# Patient Record
Sex: FEMALE | Race: BLACK OR AFRICAN AMERICAN | NOT HISPANIC OR LATINO | Employment: OTHER | URBAN - METROPOLITAN AREA
[De-identification: names, ages, dates, MRNs, and addresses within clinical notes are randomized per-mention and may not be internally consistent; named-entity substitution may affect disease eponyms.]

---

## 2017-12-11 ENCOUNTER — HISTORICAL (OUTPATIENT)
Dept: RADIOLOGY | Facility: HOSPITAL | Age: 62
End: 2017-12-11

## 2018-01-22 ENCOUNTER — HISTORICAL (OUTPATIENT)
Dept: INTERNAL MEDICINE | Facility: CLINIC | Age: 63
End: 2018-01-22

## 2018-09-06 ENCOUNTER — HISTORICAL (OUTPATIENT)
Dept: INTERNAL MEDICINE | Facility: CLINIC | Age: 63
End: 2018-09-06

## 2019-08-13 ENCOUNTER — HISTORICAL (OUTPATIENT)
Dept: ADMINISTRATIVE | Facility: HOSPITAL | Age: 64
End: 2019-08-13

## 2020-02-07 ENCOUNTER — HISTORICAL (OUTPATIENT)
Dept: ADMINISTRATIVE | Facility: HOSPITAL | Age: 65
End: 2020-02-07

## 2020-08-05 ENCOUNTER — HISTORICAL (OUTPATIENT)
Dept: INTERNAL MEDICINE | Facility: CLINIC | Age: 65
End: 2020-08-05

## 2020-08-05 LAB
ABS NEUT (OLG): 3.78 X10(3)/MCL (ref 2.1–9.2)
ALBUMIN SERPL-MCNC: 3.1 GM/DL (ref 3.4–5)
ALBUMIN/GLOB SERPL: 0.6 RATIO (ref 1.1–2)
ALP SERPL-CCNC: 129 UNIT/L (ref 45–117)
ALT SERPL-CCNC: 28 UNIT/L (ref 12–78)
AST SERPL-CCNC: 27 UNIT/L (ref 15–37)
BASOPHILS # BLD AUTO: 0 X10(3)/MCL (ref 0–0.2)
BASOPHILS NFR BLD AUTO: 0 %
BILIRUB SERPL-MCNC: 0.2 MG/DL (ref 0.2–1)
BILIRUBIN DIRECT+TOT PNL SERPL-MCNC: <0.1 MG/DL (ref 0–0.2)
BILIRUBIN DIRECT+TOT PNL SERPL-MCNC: ABNORMAL MG/DL
BUN SERPL-MCNC: 10 MG/DL (ref 7–18)
CALCIUM SERPL-MCNC: 8.2 MG/DL (ref 8.5–10.1)
CHLORIDE SERPL-SCNC: 109 MMOL/L (ref 98–107)
CHOLEST SERPL-MCNC: 157 MG/DL
CHOLEST/HDLC SERPL: 3.7 {RATIO} (ref 0–4.4)
CO2 SERPL-SCNC: 28 MMOL/L (ref 21–32)
CREAT SERPL-MCNC: 0.8 MG/DL (ref 0.6–1.3)
EOSINOPHIL # BLD AUTO: 0.2 X10(3)/MCL (ref 0–0.9)
EOSINOPHIL NFR BLD AUTO: 3 %
ERYTHROCYTE [DISTWIDTH] IN BLOOD BY AUTOMATED COUNT: 15.3 % (ref 11.5–14.5)
EST. AVERAGE GLUCOSE BLD GHB EST-MCNC: 137 MG/DL
GLOBULIN SER-MCNC: 4.9 GM/ML (ref 2.3–3.5)
GLUCOSE SERPL-MCNC: 79 MG/DL (ref 74–106)
HBA1C MFR BLD: 6.4 % (ref 4.2–6.3)
HCT VFR BLD AUTO: 35.1 % (ref 35–46)
HDLC SERPL-MCNC: 42 MG/DL (ref 40–59)
HGB BLD-MCNC: 10.6 GM/DL (ref 12–16)
IMM GRANULOCYTES # BLD AUTO: 0.02 10*3/UL
IMM GRANULOCYTES NFR BLD AUTO: 0 %
LDLC SERPL CALC-MCNC: 97 MG/DL
LYMPHOCYTES # BLD AUTO: 2.3 X10(3)/MCL (ref 0.6–4.6)
LYMPHOCYTES NFR BLD AUTO: 33 %
MCH RBC QN AUTO: 23.1 PG (ref 26–34)
MCHC RBC AUTO-ENTMCNC: 30.2 GM/DL (ref 31–37)
MCV RBC AUTO: 76.5 FL (ref 80–100)
MONOCYTES # BLD AUTO: 0.6 X10(3)/MCL (ref 0.1–1.3)
MONOCYTES NFR BLD AUTO: 9 %
NEUTROPHILS # BLD AUTO: 3.78 X10(3)/MCL (ref 2.1–9.2)
NEUTROPHILS NFR BLD AUTO: 55 %
PLATELET # BLD AUTO: 246 X10(3)/MCL (ref 130–400)
PMV BLD AUTO: 10.2 FL (ref 7.4–10.4)
POTASSIUM SERPL-SCNC: 4.1 MMOL/L (ref 3.5–5.1)
PROT SERPL-MCNC: 8 GM/DL (ref 6.4–8.2)
RBC # BLD AUTO: 4.59 X10(6)/MCL (ref 4–5.2)
SODIUM SERPL-SCNC: 142 MMOL/L (ref 136–145)
TRIGL SERPL-MCNC: 90 MG/DL
TSH SERPL-ACNC: 0.48 MIU/L (ref 0.36–3.74)
VLDLC SERPL CALC-MCNC: 18 MG/DL
WBC # SPEC AUTO: 6.9 X10(3)/MCL (ref 4.5–11)

## 2020-08-06 ENCOUNTER — HISTORICAL (OUTPATIENT)
Dept: ADMINISTRATIVE | Facility: HOSPITAL | Age: 65
End: 2020-08-06

## 2020-08-06 LAB
APPEARANCE, UA: ABNORMAL
BACTERIA #/AREA URNS AUTO: ABNORMAL /HPF
BILIRUB UR QL STRIP: NEGATIVE
COLOR UR: ABNORMAL
GLUCOSE (UA): NEGATIVE
HGB UR QL STRIP: NEGATIVE
HYALINE CASTS #/AREA URNS LPF: ABNORMAL /LPF
KETONES UR QL STRIP: NEGATIVE
LEUKOCYTE ESTERASE UR QL STRIP: 250 LEU/UL
NITRITE UR QL STRIP: ABNORMAL
PH UR STRIP: 6 [PH] (ref 4.5–8)
PROT UR QL STRIP: 30 MG/DL
RBC #/AREA URNS AUTO: ABNORMAL /HPF
SP GR UR STRIP: 1.01 (ref 1–1.03)
SQUAMOUS #/AREA URNS LPF: >100 /LPF
UROBILINOGEN UR STRIP-ACNC: NORMAL
WBC #/AREA URNS AUTO: ABNORMAL /HPF

## 2020-08-14 ENCOUNTER — HISTORICAL (OUTPATIENT)
Dept: RADIOLOGY | Facility: HOSPITAL | Age: 65
End: 2020-08-14

## 2021-02-09 ENCOUNTER — HISTORICAL (OUTPATIENT)
Dept: ADMINISTRATIVE | Facility: HOSPITAL | Age: 66
End: 2021-02-09

## 2021-08-02 ENCOUNTER — HISTORICAL (OUTPATIENT)
Dept: ADMINISTRATIVE | Facility: HOSPITAL | Age: 66
End: 2021-08-02

## 2021-08-02 LAB — SARS-COV-2 RNA RESP QL NAA+PROBE: POSITIVE

## 2021-08-20 ENCOUNTER — HOSPITAL ENCOUNTER (OUTPATIENT)
Dept: ONCOLOGY | Facility: HOSPITAL | Age: 66
End: 2021-08-27
Attending: INTERNAL MEDICINE | Admitting: INTERNAL MEDICINE

## 2021-08-20 LAB
ABS NEUT (OLG): 11.82 X10(3)/MCL (ref 2.1–9.2)
ALBUMIN SERPL-MCNC: 3.5 GM/DL (ref 3.4–4.8)
ALBUMIN/GLOB SERPL: 1 RATIO (ref 1.1–2)
ALP SERPL-CCNC: 75 UNIT/L (ref 40–150)
ALT SERPL-CCNC: 95 UNIT/L (ref 0–55)
AMMONIA PLAS-MSCNC: 33 UMOL/L (ref 18–72)
AMPHET UR QL SCN: NEGATIVE
APPEARANCE, UA: CLEAR
APTT PPP: 28.7 SECOND(S) (ref 23.2–33.7)
AST SERPL-CCNC: 55 UNIT/L (ref 5–34)
BACTERIA SPEC CULT: ABNORMAL /HPF
BARBITURATE SCN PRESENT UR: NEGATIVE
BASOPHILS # BLD AUTO: 0 X10(3)/MCL (ref 0–0.2)
BASOPHILS NFR BLD AUTO: 0 %
BENZODIAZ UR QL SCN: NEGATIVE
BILIRUB SERPL-MCNC: 0.7 MG/DL
BILIRUB UR QL STRIP: NEGATIVE
BILIRUBIN DIRECT+TOT PNL SERPL-MCNC: 0.3 MG/DL (ref 0–0.5)
BILIRUBIN DIRECT+TOT PNL SERPL-MCNC: 0.4 MG/DL (ref 0–0.8)
BUN SERPL-MCNC: 45.6 MG/DL (ref 9.8–20.1)
CALCIUM SERPL-MCNC: 8.7 MG/DL (ref 8.4–10.2)
CANNABINOIDS UR QL SCN: NEGATIVE
CHLORIDE SERPL-SCNC: 112 MMOL/L (ref 98–107)
CO2 SERPL-SCNC: 21 MMOL/L (ref 23–31)
COCAINE UR QL SCN: NEGATIVE
COLOR UR: YELLOW
CREAT SERPL-MCNC: 1.28 MG/DL (ref 0.55–1.02)
ERYTHROCYTE [DISTWIDTH] IN BLOOD BY AUTOMATED COUNT: 16.2 % (ref 11.5–17)
ETHANOL SERPL-MCNC: <10 MG/DL
GLOBULIN SER-MCNC: 3.6 GM/DL (ref 2.4–3.5)
GLUCOSE (UA): NEGATIVE
GLUCOSE SERPL-MCNC: 169 MG/DL (ref 82–115)
HCO3 UR-SCNC: 25.3 MMOL/L (ref 22–26)
HCT VFR BLD AUTO: 45.3 % (ref 37–47)
HGB BLD-MCNC: 13.8 GM/DL (ref 12–16)
HGB UR QL STRIP: NEGATIVE
INR PPP: 1.6 (ref 0–1.3)
KETONES UR QL STRIP: NEGATIVE
LACTATE SERPL-SCNC: 2 MMOL/L (ref 0.5–2.2)
LEUKOCYTE ESTERASE UR QL STRIP: ABNORMAL
LYMPHOCYTES # BLD AUTO: 0.8 X10(3)/MCL (ref 0.6–4.6)
LYMPHOCYTES NFR BLD AUTO: 6 %
MCH RBC QN AUTO: 24.5 PG (ref 27–31)
MCHC RBC AUTO-ENTMCNC: 30.5 GM/DL (ref 33–36)
MCV RBC AUTO: 80.3 FL (ref 80–94)
MONOCYTES # BLD AUTO: 0.7 X10(3)/MCL (ref 0.1–1.3)
MONOCYTES NFR BLD AUTO: 5 %
MRSA SCREEN BY PCR: NEGATIVE
NEUTROPHILS # BLD AUTO: 11.82 X10(3)/MCL (ref 2.1–9.2)
NEUTROPHILS NFR BLD AUTO: 88 %
NITRITE UR QL STRIP: NEGATIVE
O2 HGB ARTERIAL: 91.9 % (ref 94–97)
OPIATES UR QL SCN: NEGATIVE
PCO2 BLDA: 39 MMHG (ref 35–45)
PCP UR QL: NEGATIVE
PH SMN: 7.42 [PH] (ref 7.35–7.45)
PH UR STRIP.AUTO: 5 [PH] (ref 5–7.5)
PH UR STRIP: 5 [PH] (ref 5–9)
PLATELET # BLD AUTO: 403 X10(3)/MCL (ref 130–400)
PMV BLD AUTO: 12.1 FL (ref 9.4–12.4)
PO2 BLDA: 66 MMHG (ref 80–100)
POC ALLENS TEST: POSITIVE
POC BE: 0.9 (ref -2–3)
POC CAO2: 18.6 ML/DL (ref 15.7–21.6)
POC CO HGB: 2.5 %
POC CO2: 26.5 MMOL/L (ref 22–27)
POC IONIZED CALCIUM: 1.13 MMOL/L (ref 1.12–1.23)
POC MET HGB: 1.2 % (ref 0.4–1.5)
POC SAMPLESOURCE: ABNORMAL
POC SATURATED O2: 93.1 % (ref 96–97)
POC SITE: ABNORMAL
POC THB: 14.4 GM/DL (ref 12–16)
POC TREATMENT: ABNORMAL
POTASSIUM BLD-SCNC: 4.3 MMOL/L (ref 3.6–5)
POTASSIUM SERPL-SCNC: 4.4 MMOL/L (ref 3.5–5.1)
PROT SERPL-MCNC: 7.1 GM/DL (ref 5.8–7.6)
PROT UR QL STRIP: NEGATIVE
PROTHROMBIN TIME: 18.6 SECOND(S) (ref 12.5–14.5)
RBC # BLD AUTO: 5.64 X10(6)/MCL (ref 4.2–5.4)
RBC #/AREA URNS HPF: ABNORMAL /[HPF]
RESTICK: NORMAL
SARS-COV-2 AG RESP QL IA.RAPID: NEGATIVE
SODIUM BLD-SCNC: 144 MMOL/L (ref 137–145)
SODIUM SERPL-SCNC: 149 MMOL/L (ref 136–145)
SP GR FLD REFRACTOMETRY: 1.01 (ref 1–1.03)
SP GR UR STRIP: 1.01 (ref 1–1.03)
SQUAMOUS EPITHELIAL, UA: ABNORMAL /HPF (ref 0–4)
TROPONIN I SERPL-MCNC: 0.02 NG/ML (ref 0–0.04)
UROBILINOGEN UR STRIP-ACNC: 0.2
WBC # SPEC AUTO: 13.4 X10(3)/MCL (ref 4.5–11.5)
WBC #/AREA URNS HPF: ABNORMAL /[HPF]

## 2021-08-21 LAB
ABS NEUT (OLG): 10.85 X10(3)/MCL (ref 2.1–9.2)
ALBUMIN SERPL-MCNC: 3.3 GM/DL (ref 3.4–4.8)
ALBUMIN/GLOB SERPL: 1 RATIO (ref 1.1–2)
ALP SERPL-CCNC: 69 UNIT/L (ref 40–150)
ALT SERPL-CCNC: 94 UNIT/L (ref 0–55)
AST SERPL-CCNC: 48 UNIT/L (ref 5–34)
BASOPHILS # BLD AUTO: 0 X10(3)/MCL (ref 0–0.2)
BASOPHILS NFR BLD AUTO: 0 %
BILIRUB SERPL-MCNC: 0.5 MG/DL
BILIRUBIN DIRECT+TOT PNL SERPL-MCNC: 0.2 MG/DL (ref 0–0.8)
BILIRUBIN DIRECT+TOT PNL SERPL-MCNC: 0.3 MG/DL (ref 0–0.5)
BUN SERPL-MCNC: 33.7 MG/DL (ref 9.8–20.1)
CALCIUM SERPL-MCNC: 8.5 MG/DL (ref 8.4–10.2)
CHLORIDE SERPL-SCNC: 117 MMOL/L (ref 98–107)
CO2 SERPL-SCNC: 22 MMOL/L (ref 23–31)
CREAT SERPL-MCNC: 0.94 MG/DL (ref 0.55–1.02)
CRP SERPL HS-MCNC: <0.03 MG/DL
D DIMER PPP IA.FEU-MCNC: 0.27 MCG/ML FEU (ref 0–0.5)
EOSINOPHIL # BLD AUTO: 0 X10(3)/MCL (ref 0–0.9)
EOSINOPHIL NFR BLD AUTO: 0 %
ERYTHROCYTE [DISTWIDTH] IN BLOOD BY AUTOMATED COUNT: 16.5 % (ref 11.5–17)
GLOBULIN SER-MCNC: 3.2 GM/DL (ref 2.4–3.5)
GLUCOSE SERPL-MCNC: 162 MG/DL (ref 82–115)
HCT VFR BLD AUTO: 45.9 % (ref 37–47)
HGB BLD-MCNC: 14.2 GM/DL (ref 12–16)
IMM GRANULOCYTES # BLD AUTO: 0.06 10*3/UL
IMM GRANULOCYTES NFR BLD AUTO: 0 %
LYMPHOCYTES # BLD AUTO: 1 X10(3)/MCL (ref 0.6–4.6)
LYMPHOCYTES NFR BLD AUTO: 8 %
MCH RBC QN AUTO: 24.9 PG (ref 27–31)
MCHC RBC AUTO-ENTMCNC: 30.9 GM/DL (ref 33–36)
MCV RBC AUTO: 80.4 FL (ref 80–94)
MONOCYTES # BLD AUTO: 1 X10(3)/MCL (ref 0.1–1.3)
MONOCYTES NFR BLD AUTO: 8 %
NEUTROPHILS # BLD AUTO: 10.69 X10(3)/MCL (ref 2.1–9.2)
NEUTROPHILS NFR BLD AUTO: 83 %
PLATELET # BLD AUTO: 183 X10(3)/MCL (ref 130–400)
PMV BLD AUTO: 11.6 FL (ref 7.4–10.4)
POTASSIUM SERPL-SCNC: 3.9 MMOL/L (ref 3.5–5.1)
PROT SERPL-MCNC: 6.5 GM/DL (ref 5.8–7.6)
RBC # BLD AUTO: 5.71 X10(6)/MCL (ref 4.2–5.4)
RESTICK: NORMAL
SODIUM SERPL-SCNC: 151 MMOL/L (ref 136–145)
WBC # SPEC AUTO: 13 X10(3)/MCL (ref 4.5–11.5)

## 2021-08-22 LAB
ABS NEUT (OLG): 5.65 X10(3)/MCL (ref 2.1–9.2)
ALBUMIN SERPL-MCNC: 3.3 GM/DL (ref 3.4–4.8)
ALBUMIN/GLOB SERPL: 1.1 RATIO (ref 1.1–2)
ALP SERPL-CCNC: 69 UNIT/L (ref 40–150)
ALT SERPL-CCNC: 96 UNIT/L (ref 0–55)
AST SERPL-CCNC: 56 UNIT/L (ref 5–34)
BASOPHILS # BLD AUTO: 0 X10(3)/MCL (ref 0–0.2)
BASOPHILS NFR BLD AUTO: 0 %
BILIRUB SERPL-MCNC: 0.4 MG/DL
BILIRUBIN DIRECT+TOT PNL SERPL-MCNC: 0.2 MG/DL (ref 0–0.5)
BILIRUBIN DIRECT+TOT PNL SERPL-MCNC: 0.2 MG/DL (ref 0–0.8)
BUN SERPL-MCNC: 24.4 MG/DL (ref 9.8–20.1)
CALCIUM SERPL-MCNC: 8.4 MG/DL (ref 8.4–10.2)
CHLORIDE SERPL-SCNC: 112 MMOL/L (ref 98–107)
CO2 SERPL-SCNC: 23 MMOL/L (ref 23–31)
CREAT SERPL-MCNC: 0.98 MG/DL (ref 0.55–1.02)
EOSINOPHIL # BLD AUTO: 0.1 X10(3)/MCL (ref 0–0.9)
EOSINOPHIL NFR BLD AUTO: 1 %
ERYTHROCYTE [DISTWIDTH] IN BLOOD BY AUTOMATED COUNT: 16.1 % (ref 11.5–17)
GLOBULIN SER-MCNC: 3 GM/DL (ref 2.4–3.5)
GLUCOSE SERPL-MCNC: 120 MG/DL (ref 82–115)
HCT VFR BLD AUTO: 38.9 % (ref 37–47)
HGB BLD-MCNC: 11.9 GM/DL (ref 12–16)
LYMPHOCYTES # BLD AUTO: 1.2 X10(3)/MCL (ref 0.6–4.6)
LYMPHOCYTES NFR BLD AUTO: 16 %
MAGNESIUM SERPL-MCNC: 2.3 MG/DL (ref 1.6–2.6)
MCH RBC QN AUTO: 24.7 PG (ref 27–31)
MCHC RBC AUTO-ENTMCNC: 30.6 GM/DL (ref 33–36)
MCV RBC AUTO: 80.9 FL (ref 80–94)
MONOCYTES # BLD AUTO: 0.6 X10(3)/MCL (ref 0.1–1.3)
MONOCYTES NFR BLD AUTO: 8 %
NEUTROPHILS # BLD AUTO: 5.65 X10(3)/MCL (ref 2.1–9.2)
NEUTROPHILS NFR BLD AUTO: 74 %
PHOSPHATE SERPL-MCNC: 3.5 MG/DL (ref 2.3–4.7)
PLATELET # BLD AUTO: 184 X10(3)/MCL (ref 130–400)
PMV BLD AUTO: 10.8 FL (ref 9.4–12.4)
POTASSIUM SERPL-SCNC: 3.9 MMOL/L (ref 3.5–5.1)
PROT SERPL-MCNC: 6.3 GM/DL (ref 5.8–7.6)
RBC # BLD AUTO: 4.81 X10(6)/MCL (ref 4.2–5.4)
SODIUM SERPL-SCNC: 147 MMOL/L (ref 136–145)
WBC # SPEC AUTO: 7.6 X10(3)/MCL (ref 4.5–11.5)

## 2021-08-23 LAB
ABS NEUT (OLG): 4.59 X10(3)/MCL (ref 2.1–9.2)
ALBUMIN SERPL-MCNC: 3.2 GM/DL (ref 3.4–4.8)
ALBUMIN/GLOB SERPL: 1 RATIO (ref 1.1–2)
ALP SERPL-CCNC: 70 UNIT/L (ref 40–150)
ALT SERPL-CCNC: 94 UNIT/L (ref 0–55)
AST SERPL-CCNC: 61 UNIT/L (ref 5–34)
BASOPHILS # BLD AUTO: 0 X10(3)/MCL (ref 0–0.2)
BASOPHILS NFR BLD AUTO: 0 %
BILIRUB SERPL-MCNC: 0.5 MG/DL
BILIRUBIN DIRECT+TOT PNL SERPL-MCNC: 0.2 MG/DL (ref 0–0.5)
BILIRUBIN DIRECT+TOT PNL SERPL-MCNC: 0.3 MG/DL (ref 0–0.8)
BUN SERPL-MCNC: 19.6 MG/DL (ref 9.8–20.1)
CALCIUM SERPL-MCNC: 8.2 MG/DL (ref 8.4–10.2)
CHLORIDE SERPL-SCNC: 109 MMOL/L (ref 98–107)
CO2 SERPL-SCNC: 22 MMOL/L (ref 23–31)
CREAT SERPL-MCNC: 0.83 MG/DL (ref 0.55–1.02)
EOSINOPHIL # BLD AUTO: 0.2 X10(3)/MCL (ref 0–0.9)
EOSINOPHIL NFR BLD AUTO: 2 %
ERYTHROCYTE [DISTWIDTH] IN BLOOD BY AUTOMATED COUNT: 16 % (ref 11.5–17)
GLOBULIN SER-MCNC: 3.1 GM/DL (ref 2.4–3.5)
GLUCOSE SERPL-MCNC: 109 MG/DL (ref 82–115)
HCT VFR BLD AUTO: 40.9 % (ref 37–47)
HGB BLD-MCNC: 12.5 GM/DL (ref 12–16)
LYMPHOCYTES # BLD AUTO: 1.9 X10(3)/MCL (ref 0.6–4.6)
LYMPHOCYTES NFR BLD AUTO: 25 %
MAGNESIUM SERPL-MCNC: 2.1 MG/DL (ref 1.6–2.6)
MCH RBC QN AUTO: 24.8 PG (ref 27–31)
MCHC RBC AUTO-ENTMCNC: 30.6 GM/DL (ref 33–36)
MCV RBC AUTO: 81 FL (ref 80–94)
MONOCYTES # BLD AUTO: 0.7 X10(3)/MCL (ref 0.1–1.3)
MONOCYTES NFR BLD AUTO: 10 %
NEUTROPHILS # BLD AUTO: 4.59 X10(3)/MCL (ref 2.1–9.2)
NEUTROPHILS NFR BLD AUTO: 62 %
PLATELET # BLD AUTO: 153 X10(3)/MCL (ref 130–400)
PMV BLD AUTO: 11.6 FL (ref 9.4–12.4)
POTASSIUM SERPL-SCNC: 4.1 MMOL/L (ref 3.5–5.1)
PROT SERPL-MCNC: 6.3 GM/DL (ref 5.8–7.6)
RBC # BLD AUTO: 5.05 X10(6)/MCL (ref 4.2–5.4)
SODIUM SERPL-SCNC: 143 MMOL/L (ref 136–145)
WBC # SPEC AUTO: 7.5 X10(3)/MCL (ref 4.5–11.5)

## 2021-08-25 LAB
FINAL CULTURE: NORMAL
FINAL CULTURE: NORMAL

## 2021-08-26 LAB
BUN SERPL-MCNC: 15 MG/DL (ref 9.8–20.1)
CALCIUM SERPL-MCNC: 8.4 MG/DL (ref 8.4–10.2)
CHLORIDE SERPL-SCNC: 111 MMOL/L (ref 98–107)
CO2 SERPL-SCNC: 21 MMOL/L (ref 23–31)
CREAT SERPL-MCNC: 0.8 MG/DL (ref 0.55–1.02)
CREAT/UREA NIT SERPL: 19
GLUCOSE SERPL-MCNC: 110 MG/DL (ref 82–115)
POTASSIUM SERPL-SCNC: 4 MMOL/L (ref 3.5–5.1)
SARS-COV-2 AG RESP QL IA.RAPID: NEGATIVE
SODIUM SERPL-SCNC: 143 MMOL/L (ref 136–145)

## 2021-08-28 ENCOUNTER — HISTORICAL (OUTPATIENT)
Dept: ADMINISTRATIVE | Facility: HOSPITAL | Age: 66
End: 2021-08-28

## 2021-08-28 LAB
ABS NEUT (OLG): 3.31 X10(3)/MCL (ref 2.1–9.2)
ALBUMIN SERPL-MCNC: 3.1 GM/DL (ref 3.4–4.8)
ALBUMIN/GLOB SERPL: 1.3 RATIO (ref 1.1–2)
ALP SERPL-CCNC: 84 UNIT/L (ref 40–150)
ALT SERPL-CCNC: 74 UNIT/L (ref 0–55)
AST SERPL-CCNC: 41 UNIT/L (ref 5–34)
BASOPHILS # BLD AUTO: 0 X10(3)/MCL (ref 0–0.2)
BASOPHILS NFR BLD AUTO: 0 %
BILIRUB SERPL-MCNC: 0.5 MG/DL
BILIRUBIN DIRECT+TOT PNL SERPL-MCNC: 0.2 MG/DL (ref 0–0.5)
BILIRUBIN DIRECT+TOT PNL SERPL-MCNC: 0.3 MG/DL (ref 0–0.8)
BUN SERPL-MCNC: 13.9 MG/DL (ref 9.8–20.1)
CALCIUM SERPL-MCNC: 8.4 MG/DL (ref 8.4–10.2)
CHLORIDE SERPL-SCNC: 111 MMOL/L (ref 98–107)
CHOLEST SERPL-MCNC: 156 MG/DL
CHOLEST/HDLC SERPL: 4 {RATIO} (ref 0–5)
CO2 SERPL-SCNC: 23 MMOL/L (ref 23–31)
CREAT SERPL-MCNC: 0.81 MG/DL (ref 0.55–1.02)
DEPRECATED CALCIDIOL+CALCIFEROL SERPL-MC: 8.2 NG/ML (ref 30–80)
EOSINOPHIL # BLD AUTO: 0.1 X10(3)/MCL (ref 0–0.9)
EOSINOPHIL NFR BLD AUTO: 2 %
ERYTHROCYTE [DISTWIDTH] IN BLOOD BY AUTOMATED COUNT: 17.4 % (ref 11.5–17)
EST. AVERAGE GLUCOSE BLD GHB EST-MCNC: 139.8 MG/DL
GLOBULIN SER-MCNC: 2.4 GM/DL (ref 2.4–3.5)
GLUCOSE SERPL-MCNC: 139 MG/DL (ref 82–115)
HBA1C MFR BLD: 6.5 %
HCT VFR BLD AUTO: 37.1 % (ref 37–47)
HDLC SERPL-MCNC: 39 MG/DL (ref 35–60)
HGB BLD-MCNC: 11.6 GM/DL (ref 12–16)
LDLC SERPL CALC-MCNC: 84 MG/DL (ref 50–140)
LYMPHOCYTES # BLD AUTO: 1.5 X10(3)/MCL (ref 0.6–4.6)
LYMPHOCYTES NFR BLD AUTO: 28 %
MAGNESIUM SERPL-MCNC: 1.7 MG/DL (ref 1.6–2.6)
MCH RBC QN AUTO: 25.4 PG (ref 27–31)
MCHC RBC AUTO-ENTMCNC: 31.3 GM/DL (ref 33–36)
MCV RBC AUTO: 81.4 FL (ref 80–94)
MONOCYTES # BLD AUTO: 0.4 X10(3)/MCL (ref 0.1–1.3)
MONOCYTES NFR BLD AUTO: 8 %
NEUTROPHILS # BLD AUTO: 3.31 X10(3)/MCL (ref 2.1–9.2)
NEUTROPHILS NFR BLD AUTO: 62 %
PLATELET # BLD AUTO: 98 X10(3)/MCL (ref 130–400)
PMV BLD AUTO: 11.7 FL (ref 9.4–12.4)
POTASSIUM SERPL-SCNC: 3.5 MMOL/L (ref 3.5–5.1)
PREALB SERPL-MCNC: 27.4 MG/DL (ref 14–37)
PROT SERPL-MCNC: 5.5 GM/DL (ref 5.8–7.6)
RBC # BLD AUTO: 4.56 X10(6)/MCL (ref 4.2–5.4)
SODIUM SERPL-SCNC: 145 MMOL/L (ref 136–145)
T4 FREE SERPL-MCNC: 0.73 NG/DL (ref 0.7–1.48)
TRIGL SERPL-MCNC: 165 MG/DL (ref 37–140)
TSH SERPL-ACNC: 0.64 UIU/ML (ref 0.35–4.94)
VLDLC SERPL CALC-MCNC: 33 MG/DL
WBC # SPEC AUTO: 5.4 X10(3)/MCL (ref 4.5–11.5)

## 2021-09-30 ENCOUNTER — HISTORICAL (OUTPATIENT)
Dept: INTERNAL MEDICINE | Facility: CLINIC | Age: 66
End: 2021-09-30

## 2021-10-28 ENCOUNTER — HISTORICAL (OUTPATIENT)
Dept: RADIOLOGY | Facility: HOSPITAL | Age: 66
End: 2021-10-28

## 2022-03-04 ENCOUNTER — HISTORICAL (OUTPATIENT)
Dept: ADMINISTRATIVE | Facility: HOSPITAL | Age: 67
End: 2022-03-04

## 2022-03-15 ENCOUNTER — HISTORICAL (OUTPATIENT)
Dept: INTERNAL MEDICINE | Facility: CLINIC | Age: 67
End: 2022-03-15

## 2022-03-15 LAB
ABS NEUT (OLG): 5.17 (ref 2.1–9.2)
ALBUMIN SERPL-MCNC: 3.3 G/DL (ref 3.4–4.8)
ALBUMIN/GLOB SERPL: 0.7 {RATIO} (ref 1.1–2)
ALP SERPL-CCNC: 133 U/L (ref 40–150)
ALT SERPL-CCNC: 17 U/L (ref 0–55)
AST SERPL-CCNC: 27 U/L (ref 5–34)
BASOPHILS # BLD AUTO: 0 10*3/UL (ref 0–0.2)
BASOPHILS NFR BLD AUTO: 0 %
BILIRUB SERPL-MCNC: 0.3 MG/DL
BILIRUBIN DIRECT+TOT PNL SERPL-MCNC: 0.1 (ref 0–0.8)
BILIRUBIN DIRECT+TOT PNL SERPL-MCNC: 0.2 (ref 0–0.5)
BUN SERPL-MCNC: 11.3 MG/DL (ref 9.8–20.1)
CALCIUM SERPL-MCNC: 9 MG/DL (ref 8.7–10.5)
CHLORIDE SERPL-SCNC: 104 MMOL/L (ref 98–107)
CHOLEST SERPL-MCNC: 139 MG/DL
CHOLEST/HDLC SERPL: 4 {RATIO} (ref 0–5)
CO2 SERPL-SCNC: 26 MMOL/L (ref 23–31)
CREAT SERPL-MCNC: 0.83 MG/DL (ref 0.55–1.02)
EOSINOPHIL # BLD AUTO: 0.2 10*3/UL (ref 0–0.9)
EOSINOPHIL NFR BLD AUTO: 3 %
ERYTHROCYTE [DISTWIDTH] IN BLOOD BY AUTOMATED COUNT: 17.2 % (ref 11.5–14.5)
EST. AVERAGE GLUCOSE BLD GHB EST-MCNC: 128.4 MG/DL
FLAG2 (OHS): 50
FLAG3 (OHS): 80
FLAGS (OHS): 80
GLOBULIN SER-MCNC: 4.8 G/DL (ref 2.4–3.5)
GLUCOSE SERPL-MCNC: 114 MG/DL (ref 82–115)
HBA1C MFR BLD: 6.1 %
HCT VFR BLD AUTO: 34.1 % (ref 35–46)
HDLC SERPL-MCNC: 35 MG/DL (ref 35–60)
HGB BLD-MCNC: 10.5 G/DL (ref 12–16)
ICTERIC INTERF INDEX SERPL-ACNC: 0
IMM GRANULOCYTES # BLD AUTO: 0.03 10*3/UL
IMM GRANULOCYTES NFR BLD AUTO: 0 %
IMM. NE 2 SUSPECT FLAG (OHS): 30
LDLC SERPL CALC-MCNC: 83 MG/DL (ref 50–140)
LIPEMIC INTERF INDEX SERPL-ACNC: 8
LOW EVENT # SUSPECT FLAG (OHS): 100
LYMPHOCYTES # BLD AUTO: 1.9 10*3/UL (ref 0.6–4.6)
LYMPHOCYTES NFR BLD AUTO: 24 %
MANUAL DIFF? (OHS): NO
MCH RBC QN AUTO: 22.8 PG (ref 26–34)
MCHC RBC AUTO-ENTMCNC: 30.8 G/DL (ref 31–37)
MCV RBC AUTO: 74.1 FL (ref 80–100)
MO BLASTS SUSPECT FLAG (OHS): 40
MONOCYTES # BLD AUTO: 0.6 10*3/UL (ref 0.1–1.3)
MONOCYTES NFR BLD AUTO: 7 %
NEUTROPHILS # BLD AUTO: 5.17 10*3/UL (ref 2.1–9.2)
NEUTROPHILS NFR BLD AUTO: 66 %
NRBC BLD AUTO-RTO: 0 % (ref 0–0.2)
PLATELET # BLD AUTO: 298 10*3/UL (ref 130–400)
PLATELET CLUMPS SUSPECT FLAG (OHS): 10
PMV BLD AUTO: 10.1 FL (ref 7.4–10.4)
POTASSIUM SERPL-SCNC: 4.4 MMOL/L (ref 3.5–5.1)
PROT SERPL-MCNC: 8.1 G/DL (ref 5.8–7.6)
RBC # BLD AUTO: 4.6 10*6/UL (ref 4–5.2)
SODIUM SERPL-SCNC: 139 MMOL/L (ref 136–145)
T4 FREE SERPL-MCNC: 0.88 NG/DL (ref 0.7–1.48)
TRIGL SERPL-MCNC: 103 MG/DL (ref 37–140)
TSH SERPL-ACNC: 0.44 M[IU]/L (ref 0.35–4.94)
VLDLC SERPL CALC-MCNC: 21 MG/DL
WBC # SPEC AUTO: 7.9 10*3/UL (ref 4.5–11)
ZZGIANT PLATELETS (OHS): 20

## 2022-04-04 ENCOUNTER — HISTORICAL (OUTPATIENT)
Dept: ADMINISTRATIVE | Facility: HOSPITAL | Age: 67
End: 2022-04-04

## 2022-04-10 ENCOUNTER — HISTORICAL (OUTPATIENT)
Dept: ADMINISTRATIVE | Facility: HOSPITAL | Age: 67
End: 2022-04-10
Payer: MEDICAID

## 2022-04-11 ENCOUNTER — HISTORICAL (OUTPATIENT)
Dept: GASTROENTEROLOGY | Facility: CLINIC | Age: 67
End: 2022-04-11

## 2022-04-11 LAB — SARS-COV-2 AG RESP QL IA.RAPID: NEGATIVE

## 2022-04-14 ENCOUNTER — HISTORICAL (OUTPATIENT)
Dept: ENDOSCOPY | Facility: HOSPITAL | Age: 67
End: 2022-04-14
Payer: MEDICAID

## 2022-04-14 LAB — CRC RECOMMENDATION EXT: NORMAL

## 2022-04-26 ENCOUNTER — HISTORICAL (OUTPATIENT)
Dept: RADIOLOGY | Facility: HOSPITAL | Age: 67
End: 2022-04-26
Payer: MEDICAID

## 2022-04-26 ENCOUNTER — HISTORICAL (OUTPATIENT)
Dept: ADMINISTRATIVE | Facility: HOSPITAL | Age: 67
End: 2022-04-26
Payer: MEDICAID

## 2022-04-30 VITALS
HEIGHT: 65 IN | DIASTOLIC BLOOD PRESSURE: 69 MMHG | WEIGHT: 293 LBS | WEIGHT: 293 LBS | SYSTOLIC BLOOD PRESSURE: 138 MMHG | OXYGEN SATURATION: 100 % | SYSTOLIC BLOOD PRESSURE: 141 MMHG | BODY MASS INDEX: 48.82 KG/M2 | HEIGHT: 65 IN | SYSTOLIC BLOOD PRESSURE: 149 MMHG | DIASTOLIC BLOOD PRESSURE: 70 MMHG | HEIGHT: 65 IN | WEIGHT: 293 LBS | SYSTOLIC BLOOD PRESSURE: 132 MMHG | BODY MASS INDEX: 48.82 KG/M2 | BODY MASS INDEX: 47.09 KG/M2 | DIASTOLIC BLOOD PRESSURE: 84 MMHG | HEIGHT: 66 IN | BODY MASS INDEX: 50.34 KG/M2 | DIASTOLIC BLOOD PRESSURE: 79 MMHG

## 2022-04-30 NOTE — ED PROVIDER NOTES
Patient:   Leatha Martinez             MRN: [id number]            FIN: [id number]               Age:   65 years     Sex:  Female     :  3/27/1956   Associated Diagnoses:   Acute renal failure (ARF); Acute dehydration; Acute metabolic encephalopathy; SIRS (systemic inflammatory response syndrome)   Author:   Yamini Perkins MD      Basic Information   Time seen: Date & time 2021 12:30:00.   History source: Family, EMS.   Arrival mode: Ambulance.   History limitation: Altered mental status.   Additional information: Chief Complaint from Nursing Triage Note : Chief Complaint   2021 12:19 CDT      Chief Complaint           pt reports per aasi w/ reports of AMS this AM. d/c'd from [facility] x4 days ago after admitted w/ covid and PNU. on monday was GCS14, today GCS9. 99% on 2L NC.  .      History of Present Illness   The patient presents with altered mental status, confusion and 65 year old female with history of PE and HTN presents to ED via EMS after her family reported altered mental status.  Pt has been COVID positive since 21 and was discharged from [facility] Monday, 21, on 2L of O2 at home.  Beginning today, she has become confused and combative.  Family reports she was flailing and incontinent on herself, and not answering questions at home.  Family states that she has not had any recent falls or medication changes..  The onset was just prior to arrival.  The course/duration of symptoms is worsening.  The character of symptoms is confused and combative.  The degree at onset was moderate.  The degree at present is moderate.  The exacerbating factor is none.  The relieving factor is none.  Risk factors consist of recent illness/injury (COVID positive on 21, discharged from [facility] 21.).  Prior episodes: none.  Therapy today: see nurses notes.  Associated symptoms: none.        Review of Systems             Additional review of systems information: Unable to obtain due to: Altered mental  status.      Health Status   Allergies:    Allergic Reactions (All)  Severity Not Documented  Aspirin- No reactions were documented.  Penicillins- No reactions were documented.  TraMADol- No reactions were documented..   Medications:  (Selected)   Inpatient Medications  Ordered  lidocaine 1% injectable solution: 20 mL, form: Injection, EPI, Once, first dose 02/10/21 14:45:00 CST, stop date 02/10/21 14:45:00 CST, STAT  Prescriptions  Prescribed  Medrol 4 mg oral tablet: = 1 packet(s), Oral, As Directed, as directed on package labeling, X 6 day(s), # 21 tab(s), 0 Refill(s), Pharmacy: [facility], 167, cm, Height/Length Dosing, 08/09/21 13:14:00 CDT, 128.1, kg, Weight Dosing, 08/09/21 13:14:00 CDT  Nebulizer Machine: Nebulizer Machine, See Instructions, Nebulizer machine use with albuterol nebs, # 1 units, 0 Refill(s)  Norvasc 10 mg oral tablet: 10 mg = 1 tab(s), Oral, Daily, # 90 tab(s), 3 Refill(s), Pharmacy: [facility] #[id number], 167, cm, Height/Length Dosing, 08/09/21 13:14:00 CDT, 128.1, kg, Weight Dosing, 08/09/21 13:14:00 CDT  Pantoprazole 40 mg ORAL EC-Tablet: 40 mg = 1 tab(s), Oral, Daily, # 30 tab(s), 0 Refill(s), Pharmacy: [facility], 167, cm, Height/Length Dosing, 08/09/21 13:14:00 CDT, 128.1, kg, Weight Dosing, 08/09/21 13:14:00 CDT  ProAir HFA 90 mcg/inh inhalation aerosol with adapter: 2 puff(s), INH, q6hr, PRN PRN for wheezing, # 8 gm, 11 Refill(s), Pharmacy: [facility] #[id number], 164, cm, Height/Length Dosing, 02/09/21 9:31:00 CST, 138.8, kg, Weight Dosing, 02/09/21 9:31:00 CST  Xarelto 20mg Tablet: See Instructions, TAKE 1 TABLET BY MOUTH EVERY DAY WITH SUPPER, # 90 tab(s), 3 Refill(s), Pharmacy: [facility] #[id number], 167, cm, Height/Length Dosing, 08/09/21 13:14:00 CDT, 128.1, kg, Weight Dosing, 08/09/21 13:14:00 CDT  albuterol 2.5 mg/3 mL (0.083%) inhalation solution: See Instructions, NEBULIZE ONE VIAL EVERY 6 HOURS AS NEEDED, # 180 mL,  11 Refill(s), Pharmacy: [facility] #[id number], 164, cm, Height/Length Dosing, 02/07/20 10:47:00 CST, 130, kg, Weight Dosing, 02/07/20 10:46:00 CST  baclofen 20 mg oral tablet: 20 mg = 1 tab(s), Oral, TID, # 90 tab(s), 3 Refill(s), Pharmacy: [facility] #[id number], 167, cm, Height/Length Dosing, 08/09/21 13:14:00 CDT, 128.1, kg, Weight Dosing, 08/09/21 13:14:00 CDT  citalopram 40 mg oral tablet: 40 mg = 1 tab(s), Oral, Daily, # 90 tab(s), 3 Refill(s), Pharmacy: [facility] #[id number], 167, cm, Height/Length Dosing, 08/09/21 13:14:00 CDT, 128.1, kg, Weight Dosing, 08/09/21 13:14:00 CDT  furosemide 20 mg oral tablet: See Instructions, TAKE 1 TABLET BY MOUTH EVERY DAY, # 90 tab(s), 3 Refill(s), Pharmacy: [facility] #[id number], 167, cm, Height/Length Dosing, 08/09/21 13:14:00 CDT, 128.1, kg, Weight Dosing, 08/09/21 13:14:00 CDT  gabapentin 300 mg oral capsule: 300 mg = 1 cap(s), Oral, TID, # 90 cap(s), 11 Refill(s), Pharmacy: [facility] #[id number], 164, cm, Height/Length Dosing, 02/07/20 10:47:00 CST, 130, kg, Weight Dosing, 02/07/20 10:46:00 CST  guaifenesin 600 mg oral tablet, extended release: 600 mg = 1 tab(s), Oral, q12hr, X 14 day(s), # 28 tab(s), 0 Refill(s), Pharmacy: [facility], 167, cm, Height/Length Dosing, 08/09/21 13:14:00 CDT, 128.1, kg, Weight Dosing, 08/09/21 13:14:00 CDT  lidocaine 3% topical cream: See Instructions, TOP TID, # 30 gm, 1 Refill(s), Pharmacy: [id number]  rollator with seat: rollator with seat, See Instructions, GREGORY: 99 mo, # 1 EA, 0 Refill(s)  simvastatin 20 mg oral tablet: 20 mg = 1 tab(s), Oral, Once a day (at bedtime), 1 tab(s) ( 20 mg ), PO, Once a day (at bedtime), # 90 tab(s), 3 Refill(s), Type: Maintenance, # 90 tab(s), 3 Refill(s), Pharmacy: [facility] #[id number], 167, cm, Height/Length Dosing, 08/09/21 13:...  simvastatin 20 mg oral tablet: 20 mg = 1 tab(s), Oral, Once a day (at bedtime), 1 tab(s) ( 20 mg ),  PO, Once a day (at bedtime), # 90 tab(s), 3 Refill(s), Type: Maintenance, # 90 tab(s), 3 Refill(s), Pharmacy: [facility] #[id number], 167, cm, Height/Length Dosing, 08/09/21 13:...  simvastatin 20 mg oral tablet: 20 mg = 1 tab(s), Oral, Once a day (at bedtime), 1 tab(s) ( 20 mg ), PO, Once a day (at bedtime), # 90 tab(s), 3 Refill(s), Type: Maintenance, # 90 tab(s), 3 Refill(s), Pharmacy: [facility] #[id number], 167, cm, Height/Length Dosing, 08/09/21 13:....      Past Medical/ Family/ Social History   Medical history:    Active  HTN - Hypertension ([id number]).   Surgical history:    Tonsillectomy ([id number])..   Family history:    Acute myocardial infarction.  Mother  Hypertension.  Father  Mother  .   Problem list:    Active Problems (10)  Arthritis   Deep vein thrombosis (DVT)   Degenerative disk disease   HTN (hypertension)   HTN - Hypertension   Impaired mobility   Knowledge deficit   Malnutrition   Morbid obesity   Pain   .   Social history.   Physical Examination               Vital Signs   Vital Signs   8/20/2021 12:19 CDT      Temperature Oral          36.7 DegC                             Temperature Oral (calculated)             98.06 DegF                             Peripheral Pulse Rate     80 bpm                             Respiratory Rate          20 br/min                             SpO2                      99 %                             Oxygen Therapy            Nasal cannula                             Oxygen Flow Rate          2 L/min                             Systolic Blood Pressure   162 mmHg  HI                             Diastolic Blood Pressure  84 mmHg  .   Basic Oxygen Information   8/20/2021 12:19 CDT      SpO2                      99 %                             Oxygen Therapy            Nasal cannula                             Oxygen Flow Rate          2 L/min  .   General:  Alert, no acute distress   Skin:  Warm, dry, intact   Head:  Normocephalic, atraumatic  "  Neck:  Supple, trachea midline, no tenderness   Eye:  Pupils are equal, round and reactive to light, extraocular movements are intact   Cardiovascular:  Regular rate and rhythm, No murmur, Normal peripheral perfusion   Respiratory:  Respirations are non-labored, Breath sounds: Bilateral (Coarse breath sounds bilaterally).    Back:  Nontender, Normal range of motion   Musculoskeletal:  Normal ROM, normal strength   Gastrointestinal:  Soft, Nontender   Neurological:  No focal neurological deficit observed, eyes open and tracking,  some communication, not Alert and oriented to person, place, time, and situation, Cognitive function: not to place, not to time.    Psychiatric:  Cooperative, not appropriate mood & affect    Psychiatric:  Cooperative, appropriate mood & affect          Medical Decision Making   Differential Diagnosis:  Confusion, dementia, cerebral vascular accident, transient ischemic attack, hypoglycemia, urinary tract infection, pneumonia, drug abuse, delirium, dehydration, electrolyte imbalance, intracranial hemorrhage.    Rationale:  Patient with acute encephalopathy possibly polypharmacy possibly sepsis.  Cultures obtained and broad-spectrum antibiotics administered.  Patient given IV fluids nonfocal neuro exam with some improvement while in the ED.  Admit.   Documents reviewed:  Emergency department nurses' notes, emergency department records, prior records.    Orders  Launch Order Profile (Selected)   Inpatient Orders  Ordered  30 Day Readmission: 21 12:22:50 CDT, Stop date 21 12:22:50 CDT, "This patient has had an inpatient, observation, outpatient bedded or emergency visit within the last 30 days."  Cardiac Monitorin21 12:36:00 CDT, Constant Order  EK21 12:36:00 CDT, Stat, Once, 21 12:36:00 CDT, Patient Bed, Patient Has IV, Patient on O2, -1, -1, 21 12:36:00 CDT  O2 Therapy: 21 12:22:51 CDT  Saline Lock Insert: 21 12:36:00 CDT, Stop date " 08/20/21 12:36:00 CDT  Ordered (Collected)  Automated Diff: NOW collect, 08/20/21 13:05:00 CDT, Blood, Collected, Stop date 08/20/21 13:05:00 CDT, Lab Collect, Print Label By Order Location, 08/20/21 12:36:00 CDT  Blood Culture: 08/20/21 13:05:33 CDT, STAT collect, BLOOD, Collected, Stop date 08/20/21 13:05:00 CDT  Blood Culture: 08/20/21 13:05:58 CDT, STAT collect, BLOOD, Collected, Stop date 08/20/21 13:05:00 CDT  CBC w/ Auto Diff: NOW collect, 08/20/21 13:05:33 CDT, BLOOD, Collected, Stop date 08/20/21 13:05:00 CDT, Lab Collect  CMP: STAT collect, 08/20/21 13:05:33 CDT, BLOOD, Collected, Stop date 08/20/21 13:05:00 CDT, Lab Collect  Ethanol Level: STAT collect, 08/20/21 13:05:33 CDT, BLOOD, Collected, Stop date 08/20/21 13:05:00 CDT, Lab Collect  Lactic Acid: STAT collect, 08/20/21 13:05:33 CDT, BLOOD, Collected, Stop date 08/20/21 13:05:00 CDT, Lab Collect  PT/INR ONC: STAT collect, 08/20/21 13:05:33 CDT, BLOOD, Collected, Stop date 08/20/21 13:05:00 CDT, Lab Collect  PTT: STAT collect, 08/20/21 13:05:33 CDT, BLOOD, Collected, Stop date 08/20/21 13:05:00 CDT, Lab Collect  Troponin-I: STAT collect, 08/20/21 13:05:33 CDT, BLOOD, Collected, Stop date 08/20/21 13:05:00 CDT, Lab Collect  Ordered (Dispatched)  UA with Reflex: Stat collect, Urine, 08/20/21 12:36:00 CDT, Stop date 08/20/21 12:37:00 CDT, Nurse collect  Urine Drug Screen: Stat collect, Urine, 08/20/21 12:37:00 CDT, Stop date 08/20/21 12:37:00 CDT, Nurse collect  Ordered (Exam Completed)  XR Chest 1 View: Stat, 08/20/21 12:36:00 CDT, Dyspnea, None, Patient Bed, Patient Has IV?, Patient on Oxygen?, Rad Type, Not Scheduled, 08/20/21 12:36:00 CDT  Ordered (Exam Ordered)  CT Head W/O Contrast: Stat, 08/20/21 12:37:00 CDT, Altered level of Consciousness, None, Patient Bed, Patient Has IV?, Patient on Oxygen?, Rad Type, Schedule this test, 08/20/21 12:37:00 CDT  Canceled  ABG Request POC: Stat collect, Arterial Blood, 08/20/21 12:37:00 CDT, Once, Stop date  08/20/21 12:38:00 CDT  Completed  ABG Adult w/ Coox RT: Stat collect, Arterial Blood, 08/20/21 12:36:00 CDT, Once, Stop date 08/20/21 12:37:00 CDT.   Electrocardiogram:  Time 8/20/2021 13:55:00, rate 83, normal sinus rhythm, No ST-T changes, no ectopy, normal NJ & QRS intervals, EP Interp.    Results review:  Lab results : Lab View   8/20/2021 14:35 CDT      COVID-19 Rapid            NEGATIVE    8/20/2021 14:07 CDT      Sodium Lvl                149 mmol/L  HI                             Potassium Lvl             4.4 mmol/L                             Chloride                  112 mmol/L  HI                             CO2                       21 mmol/L  LOW                             Calcium Lvl               8.7 mg/dL                             Glucose Lvl               169 mg/dL  HI                             BUN                       45.6 mg/dL  HI                             Creatinine                1.28 mg/dL  HI                             eGFR-AA                   54  NA                             eGFR-GRETEL                  44 mL/min/1.73 m2  NA                             Bili Total                0.7 mg/dL                             Bili Direct               0.3 mg/dL                             Bili Indirect             0.40 mg/dL                             AST                       55 unit/L  HI                             ALT                       95 unit/L  HI                             Alk Phos                  75 unit/L                             Total Protein             7.1 gm/dL                             Albumin Lvl               3.5 gm/dL                             Globulin                  3.6 gm/dL  HI                             A/G Ratio                 1.0 ratio  LOW                             Restick                   Done    8/20/2021 13:05 CDT      Lactic Acid Lvl           2.0 mmol/L                             Troponin-I                0.016 ng/mL                              PT                        18.6 second(s)  HI                             INR                       1.6  HI                             PTT                       28.7 second(s)                             WBC                       13.4 x10(3)/mcL  HI                             RBC                       5.64 x10(6)/mcL  HI                             Hgb                       13.8 gm/dL                             Hct                       45.3 %                             Platelet                  403 x10(3)/mcL  HI                             MCV                       80.3 fL                             MCH                       24.5 pg  LOW                             MCHC                      30.5 gm/dL  LOW                             RDW                       16.2 %                             MPV                       12.1 fL                             Abs Neut                  11.82 x10(3)/mcL  HI                             Neutro Auto               88 %  NA                             Lymph Auto                6 %  NA                             Mono Auto                 5 %  NA                             Basophil Auto             0 %  NA                             Abs Neutro                11.82 x10(3)/mcL  HI                             Abs Lymph                 0.8 x10(3)/mcL                             Abs Mono                  0.7 x10(3)/mcL                             Abs Baso                  0.0 x10(3)/mcL                             Ethanol Lvl               <10.0 mg/dL  NA    8/20/2021 12:37 CDT      Sample ABG                ART                             Treatment                 NC 2 LPM                             Site                      Radial Lt                             pH Art                    7.420                             pCO2 Art                  39.0 mmHg                             pO2 Art                   66.0 mmHg  LOW                             HCO3 Art                   25.3 mmol/L                             CO2 Totl Art              26.5 mmol/L                             O2 Sat Art                93.1 %  LOW                             D base                    0.9                             THB ABG                   14.4 gm/dL                             CO Hgb                    2.5 %  NA                             Met Hgb Art               1.2 %                             O2 Hgb                    91.9 %  LOW                             CaO2                      18.6 mL/dL                             Ionized Calcium           1.13 mmol/L                             Sodium RT                 144.0 mmol/L                             Potassium RT              4.30 mmol/L                             Allens                    Positive    8/19/2021 14:29 CDT      Covid Lab Results             .   Radiology results:  Rad Results (ST)  < 12 hrs   Accession: [id number]  Order: CT Head W/O Contrast  Report Dt/Tm: 08/20/2021 14:04  Report:      Clinical History:  Altered level of consciousness     Reference:  10 February 2021     Technique:  CT imaging of the head performed from the skull base to the vertex  without intravenous contrast. DLP 1241 mGycm. Automatic exposure  control, adjustment of mA/kV or iterative reconstruction technique was  used to reduce radiation.     Findings:  There is no acute cortical infarct, hemorrhage or mass lesion. There  is no new parenchymal attenuation abnormality. Ventricular size is  stable.      There is paranasal sinus inflammation. The mastoids are clear.     Impression:  No acute intracranial findings or significant interval change compared  to February 2021.      Accession: [id number]  Order: XR Chest 1 View  Report Dt/Tm: 08/20/2021 13:14  Report:      CLINICAL:  Dyspnea.     COMPARISON: August 10, 2021.     FINDINGS:  Cardiopericardial silhouette is within normal limits.  Bilateral lungs improved aeration and congestive changes.  Residual  changes mostly involve the left lung. No significant fluid  accumulation within the pleural spaces.       IMPRESSION:     Improvement.           .      Reexamination/ Reevaluation   Time: 8/20/2021 15:29:00 .   Vital signs   Basic Oxygen Information   8/20/2021 12:19 CDT      SpO2                      99 %                             Oxygen Therapy            Nasal cannula                             Oxygen Flow Rate          2 L/min     Notes: He is able to answer questions with some redirection.      Procedure   Critical care note   Total time: 45 minutes spent engaged in work directly related to patient care and/ or available for direct patient care, separate from teaching time.   Critical condition(s) addressed for impending deterioration include: respiratory, cardiovascular, central nervous system, metabolic, renal.   Associated risk factors: hypoxia, dysrhythmia, metabolic changes, dehydration.   Management: bedside assessment, supervision of care, Interpretation (chest x-ray, blood gases, electrocardiogram, blood pressure), Interventions hemodynamic management, Case review medical specialist, Alternate history (family, emergency medical services).   Performed by: self.      Impression and Plan   Diagnosis   Acute renal failure (ARF) (BNC20-LZ N17.9)   Acute dehydration (RNR01-RG E86.0)   Acute metabolic encephalopathy (BUT03-VX G93.41)   SIRS (systemic inflammatory response syndrome) (DRY93-GM R65.10)      Calls-Consults   -  8/20/2021 15:43:00 , hoaspitalist'.    Plan   Condition: Improved.    Disposition: Admit time  8/20/2021 15:43:00.    Counseled: Patient, Family, Regarding diagnosis, Regarding diagnostic results, Regarding treatment plan, Patient indicated understanding of instructions.    Notes: I, Kaitlin Silva, acted solely as a scribe for and in the presence of Dr. Perkins who performed the service..       Addendum   I, Yamini Perkins MD, performed the history, physical  examination, MDM and procedures as above and agree with the scribe's documentation

## 2022-05-02 NOTE — HISTORICAL [facility] CERNER
This is a historical note converted from Bryan. Formatting and pictures may have been removed.  Please reference Bryan for original formatting and attached multimedia. Chief Complaint  decreased abduction of both shoulders left knee arthritis  History of Present Illness  62 yo bf with b/l shoulder pain and decreased abdction, mo, htndepression, hx dvt, chol, cri, pt atrisk for another dvt due to mo  Review of Systems  neg c, neg pulm, neg gi gu no co  Physical Exam  Vitals & Measurements  T:?36.5? ?C (Oral)? HR:?80(Peripheral)? RR:?18? BP:?132/69?  HT:?164?cm? WT:?130?kg?  aax4 nad  ricco eomi  cv rrr s1s2 no mgr  lungs cta no cr or whz  abd nt soft  ext no edema  neuro intact  Assessment/Plan  1.?Deep vein thrombosis (DVT)?I82.409  ?pt agrees to stay on xarelto due to increased risk of recurrent dvt  ?  2.?HTN - Hypertension?I10  ?controlled  Ordered:  CBC w/ Auto Diff, Routine collect, *Est. 08/07/20 3:00:00 CDT, Blood, Order for future visit, *Est. Stop date 08/07/20 3:00:00 CDT, Lab Collect, HTN - Hypertension, 02/07/20 11:04:00 CST  Clinic Follow up, *Est. 08/07/20 3:00:00 CDT, Order for future visit, HTN - Hypertension, [facility]  Comprehensive Metabolic Panel, Routine collect, *Est. 08/07/20 3:00:00 CDT, Blood, Order for future visit, *Est. Stop date 08/07/20 3:00:00 CDT, Lab Collect, HTN - Hypertension, 02/07/20 11:04:00 CST  Hemoglobin A1C [facility], Routine collect, *Est. 08/07/20 3:00:00 CDT, Blood, Order for future visit, *Est. Stop date 08/07/20 3:00:00 CDT, Lab Collect, HTN - Hypertension, 02/07/20 11:04:00 CST  Lipid Panel, Routine collect, *Est. 08/07/20 3:00:00 CDT, Blood, Order for future visit, *Est. Stop date 08/07/20 3:00:00 CDT, Lab Collect, HTN - Hypertension, 02/07/20 11:04:00 CST  Thyroid Stimulating Hormone, Routine collect, *Est. 08/07/20 3:00:00 CDT, Blood, Order for future visit, *Est. Stop date 08/07/20 3:00:00 CDT, Lab Collect, HTN - Hypertension, 02/07/20 11:04:00 CST  Urinalysis with  Microscopic if Indicated, Routine collect, Urine, Order for future visit, *Est. 08/07/20 3:00:00 CDT, *Est. Stop date 08/07/20 3:00:00 CDT, Nurse collect, HTN - Hypertension  ?  3.?Morbid obesity?E66.01  ?dash???? sob cardiac work up  ?  4.?Arthritis?M19.90  ?ortho x rays  Ordered:  XR Shoulder Left Minimum 2 Views, Routine, *Est. 02/07/20 3:00:00 CST, Arthritis, None, Patient Bed, Patient Has IV?, Patient on Oxygen?, Rad Type, Order for future visit, Arthritis, Not Scheduled, *Est. 02/07/20 3:00:00 CST  XR Shoulder Right Minimum 2 Views, Routine, *Est. 02/07/20 3:00:00 CST, Arthritis, None, Patient Bed, Patient Has IV?, Patient on Oxygen?, Rad Type, Order for future visit, Arthritis, Not Scheduled, *Est. 02/07/20 3:00:00 CST  ?  Orders:  MG Screening Bilateral, Routine, *Est. 02/14/20 3:00:00 CST, Screening, None, Patient Bed, Patient Has IV?, Patient on Oxygen?, Rad Type, Order for future visit, Screening breast examination, Schedule this test, [facility], Follow Breast Imaging Order Se...  Nuclear Pharmacological Stress Test, *Est. 02/14/20 3:00:00 CST, Routine, SOB, *Est. Stop date 02/14/20 3:00:00 CST, Patient Bed, Patient has IV, Patient on O2, [facility], Order for future visit, SOB (shortness of breath)  Office/Outpatient Visit Level 4 Established 00120 PC, SOB (shortness of breath), [facility], 02/07/20 11:04:00 CST  XR Chest 2 Views, Routine, *Est. 02/07/20 3:00:00 CST, Dyspnea, None, Patient Bed, Patient Has IV?, Patient on Oxygen?, Rad Type, Order for future visit, SOB (shortness of breath), Not Scheduled, *Est. 02/07/20 3:00:00 CST  Referrals  [facility] Internal Referral to GI Procedure Lab, Specialty: Gastroenterology, Reason: c scope fit pos pt may stop xarelto for 1 week prior to procedure, Type: Procedure, Start: 02/07/20 11:04:00 CST  [facility] Internal Referral to Orthopedics Clinic, Specialty: Orthopedic Surgery, Reason: decreased abduction and arthritis of both  shoulders knee arthritis of left, Start: 02/07/20 11:05:00 CST  Clinic Follow up, *Est. 08/07/20 3:00:00 CDT, Order for future visit, HTN - Hypertension, [facility]   Problem List/Past Medical History  Ongoing  Arthritis  Deep vein thrombosis (DVT)  Degenerative disk disease  HTN (hypertension)  HTN - Hypertension  Morbid obesity  Pain  Historical  No qualifying data  Procedure/Surgical History  Dressing of Right Hand using Bandage (05/22/2017)  Initial treatment, first degree burn, when no more than local treatment is required (05/22/2017)  Tonsillectomy   Medications  albuterol 2.5 mg/3 mL (0.083%) inhalation solution, 2.5 mg= 3 mL, INH, q6hr, PRN, 4 refills  baclofen 10 mg oral tablet, 10 mg= 1 tab(s), Oral, TID  citalopram 40 mg oral tablet, 40 mg= 1 tab(s), Oral, Daily, 3 refills  DuoNeb 0.5 mg-2.5 mg/3 mL inhalation solution, 3 mL, NEB, Once-NOW,? ?Not taking: Last Dose Date/Time Unknown  Flexeril 5 mg oral tablet, 5 mg= 1 tab(s), Oral, TID, 1 refills  furosemide 20 mg oral tablet, See Instructions, 2 refills  gabapentin 300 mg oral capsule, 300 mg= 1 cap(s), Oral, TID, 11 refills  lidocaine 3% topical cream, See Instructions, 1 refills  naproxen 500 mg oral tablet, See Instructions  Nebulizer Machine, See Instructions,? ?Not taking: Last Dose Date/Time Unknown  Norvasc 10 mg oral tablet, 10 mg= 1 tab(s), Oral, Daily, 3 refills  rollator with seat, See Instructions,? ?Not taking: Last Dose Date/Time Unknown  simvastatin 20 mg oral tablet, 20 mg= 1 tab(s), Oral, Once a day (at bedtime), 3 refills  Ventolin HFA 90 mcg/inh inhalation aerosol, 90 mcg= 1 puff(s), INH, Daily, PRN,? ?Not taking: Last Dose Date/Time Unknown  Xarelto 20mg Tablet, See Instructions, 3 refills  Allergies  aspirin  penicillins  traMADol  Social History  Abuse/Neglect  No, No, Yes, 02/07/2020  Alcohol - Denies Alcohol Use, 05/19/2012  Never, 08/31/2018  Employment/School - Not employed or in school, 02/06/2013  disability, Work/School  description: disabled. Highest education level: High school. Operates hazardous equipment: No., 12/09/2016  Exercise - Does not exercise, 02/06/2013  Self assessment: Fair condition., 12/07/2015  Home/Environment - No Risk, 02/06/2013  Lives with Alone. Living situation: Home/Independent. Alcohol abuse in household: No. Substance abuse in household: No. Smoker in household: No. Injuries/Abuse/Neglect in household: No. Feels unsafe at home: No. Safe place to go: Yes. Family/Friends available for support: Yes. Concern for family members at home: No. Major illness in household: No. Financial concerns: No. TV/Computer concerns: No., 12/09/2016  Nutrition/Health - No Risk, 02/06/2013  Diabetic, 12/07/2015  Sexual - No Risk, 02/06/2013  Substance Use - Denies Substance Abuse, 02/06/2013  Never, 12/07/2015  Tobacco - Denies Tobacco Use, 05/19/2012  Never (less than 100 in lifetime), N/A, 02/07/2020  Family History  Acute myocardial infarction.: Mother.  Hypertension.: Mother and Father.  Immunizations  Vaccine Date Status Comments   tetanus/diphtheria/pertussis, acel(Tdap) 05/22/2017 Given other (see comment)   Health Maintenance  Health Maintenance  ???Pending?(in the next year)  ??? ??OverDue  ??? ? ? ?Diabetes Screening due??and every?  ??? ? ? ?Colorectal Screening due??09/05/19??and every 1??year(s)  ??? ??Due?  ??? ? ? ?Alcohol Misuse Screening due??01/01/20??and every 1??year(s)  ??? ? ? ?Breast Cancer Screening due??01/22/20??and every 2??year(s)  ??? ? ? ?Cervical Cancer Screening due??02/07/20??and every?  ??? ? ? ?Hypertension Management-Education due??02/07/20??and every 1??year(s)  ??? ? ? ?Zoster Vaccine due??02/07/20??and every 100??year(s)  ??? ??Due In Future?  ??? ? ? ?Depression Screening not due until??08/12/20??and every 1??year(s)  ??? ? ? ?Lipid Screening not due until??12/05/20??and every 5??year(s)  ??? ? ? ?Obesity Screening not due until??01/01/21??and every 1??year(s)  ??? ? ? ?Hypertension  Management-BMP not due until??01/20/21??and every 1??year(s)  ??? ? ? ?Blood Pressure Screening not due until??02/06/21??and every 1??year(s)  ??? ? ? ?Body Mass Index Check not due until??02/06/21??and every 1??year(s)  ??? ? ? ?Hypertension Management-Blood Pressure not due until??02/06/21??and every 1??year(s)  ???Satisfied?(in the past 1 year)  ??? ??Satisfied?  ??? ? ? ?ADL Screening on??02/07/20.??Satisfied by FraxionN, Jameel Aerial  ??? ? ? ?Blood Pressure Screening on??02/07/20.??Satisfied by Harmon LPN, Jameel Aerial  ??? ? ? ?Body Mass Index Check on??08/13/19.??Satisfied by FraxionMARIANNE, Jameel Aerial  ??? ? ? ?Depression Screening on??08/13/19.??Satisfied by Harmon LPN, Jameel Aerial  ??? ? ? ?Diabetes Screening on??01/21/20.??Satisfied by Bg Goodwin  ??? ? ? ?Hypertension Management-Blood Pressure on??02/07/20.??Satisfied by FraxionMARIANNE, Jameel Aerial  ??? ? ? ?Influenza Vaccine on??02/07/20.??Satisfied by FraxionMARIANNE, Jameel Aerial  ??? ? ? ?Obesity Screening on??01/21/20.??Satisfied by Chris DE DIOS, Ayesha FERRIS  ?

## 2022-05-02 NOTE — HISTORICAL [facility] CERNER
This is a historical note converted from Bryan. Formatting and pictures may have been removed.  Please reference Bryan for original formatting and attached multimedia. Chief Complaint  left leg and left knee and right arm pain  History of Present Illness  64 yo bf with hx dvt, cholcopdknee pain, hx low k, arthritis, htn  Review of Systems  neg cv,neg pulm, neg gi  Physical Exam  Vitals & Measurements  T:?36.7? ?C (Oral)? HR:?85(Peripheral)? RR:?18? BP:?141/84?  HT:?164?cm? WT:?135.4?kg? BMI:?50.34?  aax4 nad  ricco eomi  cv rrr s1s2 no mgr  lungs cta no cr or whz  abd nt soft  ext no edema  neuro intact  Assessment/Plan  1.?Deep vein thrombosis (DVT)?I82.409  ?xarelto  Ordered:  CBC w/ Auto Diff, Routine collect, *Est. 02/13/20 3:00:00 CST, Blood, Order for future visit, *Est. Stop date 02/13/20 3:00:00 CST, Lab Collect, Deep vein thrombosis (DVT)  HTN - Hypertension  Morbid obesity  Arthritis, 08/13/19 8:45:00 CDT  Clinic Follow up, *Est. 02/13/20 3:00:00 CST, Order for future visit, Deep vein thrombosis (DVT)  HTN - Hypertension  Morbid obesity  Arthritis, [facility]  Comprehensive Metabolic Panel, Routine collect, *Est. 02/13/20 3:00:00 CST, Blood, Order for future visit, *Est. Stop date 02/13/20 3:00:00 CST, Lab Collect, Deep vein thrombosis (DVT)  HTN - Hypertension  Morbid obesity  Arthritis, 08/13/19 8:45:00 CDT  Hemoglobin A1C [facility], Routine collect, *Est. 02/13/20 3:00:00 CST, Blood, Order for future visit, *Est. Stop date 02/13/20 3:00:00 CST, Lab Collect, Deep vein thrombosis (DVT)  HTN - Hypertension  Morbid obesity  Arthritis, 08/13/19 8:45:00 CDT  Internal Referral to Orthopedics, left knee arthritis, *Est. 09/13/19 3:00:00 CDT, Future Visit?, Deep vein thrombosis (DVT)  HTN - Hypertension  Morbid obesity  Arthritis  Lipid Panel, Routine collect, *Est. 02/13/20 3:00:00 CST, Blood, Order for future visit, *Est. Stop date 02/13/20 3:00:00 CST, Lab Collect, Deep vein thrombosis (DVT)   HTN - Hypertension  Morbid obesity  Arthritis, 08/13/19 8:45:00 CDT  Office/Outpatient Visit Level 4 Established 10729 PC, Deep vein thrombosis (DVT)  HTN - Hypertension  Morbid obesity  Arthritis, [facility], 08/13/19 8:45:00 CDT  Thyroid Stimulating Hormone, Routine collect, *Est. 02/13/20 3:00:00 CST, Blood, Order for future visit, *Est. Stop date 02/13/20 3:00:00 CST, Lab Collect, Deep vein thrombosis (DVT)  HTN - Hypertension  Morbid obesity  Arthritis, 08/13/19 8:45:00 CDT  XR Knee Left 3 Views, Routine, *Est. 08/13/19 3:00:00 CDT, Arthritis, None, Patient Bed, Patient Has IV?, Patient on Oxygen?, Rad Type, Order for future visit, Deep vein thrombosis (DVT)  HTN - Hypertension  Morbid obesity  Arthritis, Not Scheduled, *Est. 08/13/19 3:00:...  ?  2.?HTN - Hypertension?I10  ?controlled  Ordered:  CBC w/ Auto Diff, Routine collect, *Est. 02/13/20 3:00:00 CST, Blood, Order for future visit, *Est. Stop date 02/13/20 3:00:00 CST, Lab Collect, Deep vein thrombosis (DVT)  HTN - Hypertension  Morbid obesity  Arthritis, 08/13/19 8:45:00 CDT  Clinic Follow up, *Est. 02/13/20 3:00:00 CST, Order for future visit, Deep vein thrombosis (DVT)  HTN - Hypertension  Morbid obesity  Arthritis, [facility]  Comprehensive Metabolic Panel, Routine collect, *Est. 02/13/20 3:00:00 CST, Blood, Order for future visit, *Est. Stop date 02/13/20 3:00:00 CST, Lab Collect, Deep vein thrombosis (DVT)  HTN - Hypertension  Morbid obesity  Arthritis, 08/13/19 8:45:00 CDT  Hemoglobin A1C [facility], Routine collect, *Est. 02/13/20 3:00:00 CST, Blood, Order for future visit, *Est. Stop date 02/13/20 3:00:00 CST, Lab Collect, Deep vein thrombosis (DVT)  HTN - Hypertension  Morbid obesity  Arthritis, 08/13/19 8:45:00 CDT  Internal Referral to Orthopedics, left knee arthritis, *Est. 09/13/19 3:00:00 CDT, Future Visit?, Deep vein thrombosis (DVT)  HTN - Hypertension  Morbid obesity  Arthritis  Lipid Panel, Routine collect,  *Est. 02/13/20 3:00:00 CST, Blood, Order for future visit, *Est. Stop date 02/13/20 3:00:00 CST, Lab Collect, Deep vein thrombosis (DVT)  HTN - Hypertension  Morbid obesity  Arthritis, 08/13/19 8:45:00 CDT  Office/Outpatient Visit Level 4 Established 03737 PC, Deep vein thrombosis (DVT)  HTN - Hypertension  Morbid obesity  Arthritis, [facility], 08/13/19 8:45:00 CDT  Thyroid Stimulating Hormone, Routine collect, *Est. 02/13/20 3:00:00 CST, Blood, Order for future visit, *Est. Stop date 02/13/20 3:00:00 CST, Lab Collect, Deep vein thrombosis (DVT)  HTN - Hypertension  Morbid obesity  Arthritis, 08/13/19 8:45:00 CDT  XR Knee Left 3 Views, Routine, *Est. 08/13/19 3:00:00 CDT, Arthritis, None, Patient Bed, Patient Has IV?, Patient on Oxygen?, Rad Type, Order for future visit, Deep vein thrombosis (DVT)  HTN - Hypertension  Morbid obesity  Arthritis, Not Scheduled, *Est. 08/13/19 3:00:...  ?  3.?Morbid obesity?E66.01  ?dash  Ordered:  CBC w/ Auto Diff, Routine collect, *Est. 02/13/20 3:00:00 CST, Blood, Order for future visit, *Est. Stop date 02/13/20 3:00:00 CST, Lab Collect, Deep vein thrombosis (DVT)  HTN - Hypertension  Morbid obesity  Arthritis, 08/13/19 8:45:00 CDT  Clinic Follow up, *Est. 02/13/20 3:00:00 CST, Order for future visit, Deep vein thrombosis (DVT)  HTN - Hypertension  Morbid obesity  Arthritis, [facility]  Comprehensive Metabolic Panel, Routine collect, *Est. 02/13/20 3:00:00 CST, Blood, Order for future visit, *Est. Stop date 02/13/20 3:00:00 CST, Lab Collect, Deep vein thrombosis (DVT)  HTN - Hypertension  Morbid obesity  Arthritis, 08/13/19 8:45:00 CDT  Hemoglobin A1C [facility], Routine collect, *Est. 02/13/20 3:00:00 CST, Blood, Order for future visit, *Est. Stop date 02/13/20 3:00:00 CST, Lab Collect, Deep vein thrombosis (DVT)  HTN - Hypertension  Morbid obesity  Arthritis, 08/13/19 8:45:00 CDT  Internal Referral to Orthopedics, left knee arthritis, *Est. 09/13/19 3:00:00  CDT, Future Visit?, Deep vein thrombosis (DVT)  HTN - Hypertension  Morbid obesity  Arthritis  Lipid Panel, Routine collect, *Est. 02/13/20 3:00:00 CST, Blood, Order for future visit, *Est. Stop date 02/13/20 3:00:00 CST, Lab Collect, Deep vein thrombosis (DVT)  HTN - Hypertension  Morbid obesity  Arthritis, 08/13/19 8:45:00 CDT  Office/Outpatient Visit Level 4 Established 52698 PC, Deep vein thrombosis (DVT)  HTN - Hypertension  Morbid obesity  Arthritis, [facility], 08/13/19 8:45:00 CDT  Thyroid Stimulating Hormone, Routine collect, *Est. 02/13/20 3:00:00 CST, Blood, Order for future visit, *Est. Stop date 02/13/20 3:00:00 CST, Lab Collect, Deep vein thrombosis (DVT)  HTN - Hypertension  Morbid obesity  Arthritis, 08/13/19 8:45:00 CDT  XR Knee Left 3 Views, Routine, *Est. 08/13/19 3:00:00 CDT, Arthritis, None, Patient Bed, Patient Has IV?, Patient on Oxygen?, Rad Type, Order for future visit, Deep vein thrombosis (DVT)  HTN - Hypertension  Morbid obesity  Arthritis, Not Scheduled, *Est. 08/13/19 3:00:...  ?  4.?Arthritis?M19.90  ?x ray knee ortho  Ordered:  CBC w/ Auto Diff, Routine collect, *Est. 02/13/20 3:00:00 CST, Blood, Order for future visit, *Est. Stop date 02/13/20 3:00:00 CST, Lab Collect, Deep vein thrombosis (DVT)  HTN - Hypertension  Morbid obesity  Arthritis, 08/13/19 8:45:00 CDT  Clinic Follow up, *Est. 02/13/20 3:00:00 CST, Order for future visit, Deep vein thrombosis (DVT)  HTN - Hypertension  Morbid obesity  Arthritis, [facility]  Comprehensive Metabolic Panel, Routine collect, *Est. 02/13/20 3:00:00 CST, Blood, Order for future visit, *Est. Stop date 02/13/20 3:00:00 CST, Lab Collect, Deep vein thrombosis (DVT)  HTN - Hypertension  Morbid obesity  Arthritis, 08/13/19 8:45:00 CDT  Hemoglobin A1C [facility], Routine collect, *Est. 02/13/20 3:00:00 CST, Blood, Order for future visit, *Est. Stop date 02/13/20 3:00:00 CST, Lab Collect, Deep vein thrombosis (DVT)  HTN -  Hypertension  Morbid obesity  Arthritis, 08/13/19 8:45:00 CDT  Internal Referral to Orthopedics, left knee arthritis, *Est. 09/13/19 3:00:00 CDT, Future Visit?, Deep vein thrombosis (DVT)  HTN - Hypertension  Morbid obesity  Arthritis  Lipid Panel, Routine collect, *Est. 02/13/20 3:00:00 CST, Blood, Order for future visit, *Est. Stop date 02/13/20 3:00:00 CST, Lab Collect, Deep vein thrombosis (DVT)  HTN - Hypertension  Morbid obesity  Arthritis, 08/13/19 8:45:00 CDT  Office/Outpatient Visit Level 4 Established [id number] PC, Deep vein thrombosis (DVT)  HTN - Hypertension  Morbid obesity  Arthritis, [facility], 08/13/19 8:45:00 CDT  Thyroid Stimulating Hormone, Routine collect, *Est. 02/13/20 3:00:00 CST, Blood, Order for future visit, *Est. Stop date 02/13/20 3:00:00 CST, Lab Collect, Deep vein thrombosis (DVT)  HTN - Hypertension  Morbid obesity  Arthritis, 08/13/19 8:45:00 CDT  XR Knee Left 3 Views, Routine, *Est. 08/13/19 3:00:00 CDT, Arthritis, None, Patient Bed, Patient Has IV?, Patient on Oxygen?, Rad Type, Order for future visit, Deep vein thrombosis (DVT)  HTN - Hypertension  Morbid obesity  Arthritis, Not Scheduled, *Est. 08/13/19 3:00:...  ?  Referrals  Internal Referral to Orthopedics, left knee arthritis, *Est. 09/13/19 3:00:00 CDT, Future Visit?, Deep vein thrombosis (DVT)  HTN - Hypertension  Morbid obesity  Arthritis  Clinic Follow up, *Est. 02/13/20 3:00:00 CST, Order for future visit, Deep vein thrombosis (DVT)  HTN - Hypertension  Morbid obesity  Arthritis, [facility]   Problem List/Past Medical History  Ongoing  Arthritis  Deep vein thrombosis (DVT)  Degenerative disk disease  HTN (hypertension)  HTN - Hypertension  Morbid obesity  Pain  Historical  No qualifying data  Procedure/Surgical History  Dressing of Right Hand using Bandage (05/22/2017)  Initial treatment, first degree burn, when no more than local treatment is required (05/22/2017)  Tonsillectomy    Medications  albuterol 2.5 mg/3 mL (0.083%) inhalation solution, 2.5 mg= 3 mL, INH, q6hr, PRN, 4 refills  citalopram 40 mg oral tablet, 40 mg= 1 tab(s), Oral, Daily, 3 refills  DuoNeb 0.5 mg-2.5 mg/3 mL inhalation solution, 3 mL, NEB, Once-NOW  Flexeril 5 mg oral tablet, 5 mg= 1 tab(s), Oral, TID, 1 refills  furosemide 20 mg oral tablet, See Instructions, 2 refills  gabapentin 300 mg oral capsule, 300 mg= 1 cap(s), Oral, TID, 11 refills  lidocaine 3% topical cream, See Instructions, 1 refills  naproxen 500 mg oral tablet, See Instructions  Nebulizer Machine, See Instructions  Norvasc 10 mg oral tablet, 10 mg= 1 tab(s), Oral, Daily, 3 refills  potassium chloride 20 mEq oral TABLET extended release, 20 mEq= 1 tab(s), Oral, Daily, 3 refills  rollator with seat, See Instructions  simvastatin 20 mg oral tablet, 20 mg= 1 tab(s), Oral, Once a day (at bedtime), 3 refills  Ventolin HFA 90 mcg/inh inhalation aerosol, 90 mcg= 1 puff(s), INH, Daily, PRN  Xarelto 20mg Tablet, See Instructions, 3 refills  Allergies  aspirin  penicillins  traMADol  Social History  Abuse/Neglect  No, No, Yes, 08/13/2019  Alcohol - Denies Alcohol Use, 05/19/2012  Never, 08/31/2018  Never, 12/07/2015  Employment/School - Not employed or in school, 02/06/2013  disability, Work/School description: disabled. Highest education level: High school. Operates hazardous equipment: No., 12/09/2016  Exercise - Does not exercise, 02/06/2013  Self assessment: Fair condition., 12/07/2015  Home/Environment - No Risk, 02/06/2013  Lives with Alone. Living situation: Home/Independent. Alcohol abuse in household: No. Substance abuse in household: No. Smoker in household: No. Injuries/Abuse/Neglect in household: No. Feels unsafe at home: No. Safe place to go: Yes. Family/Friends available for support: Yes. Concern for family members at home: No. Major illness in household: No. Financial concerns: No. TV/Computer concerns: No., 12/09/2016  Nutrition/Health - No Risk,  02/06/2013  Diabetic, 12/07/2015  Sexual - No Risk, 02/06/2013  Substance Use - Denies Substance Abuse, 02/06/2013  Never, 08/31/2018  Never, 12/07/2015  Tobacco - Denies Tobacco Use, 05/19/2012  Never (less than 100 in lifetime), N/A, 08/13/2019  Family History  Acute myocardial infarction.: Mother.  Hypertension.: Mother and Father.  Immunizations  Vaccine Date Status Comments   tetanus/diphtheria/pertussis, acel(Tdap) 05/22/2017 Given other (see comment)   Health Maintenance  Health Maintenance  ???Pending?(in the next year)  ??? ??OverDue  ??? ? ? ?Hypertension Management-BMP due??12/06/16??and every 1??year(s)  ??? ? ? ?Alcohol Misuse Screening due??01/01/19??and every 1??year(s)  ??? ??Due?  ??? ? ? ?Cervical Cancer Screening due??08/13/19??and every?  ??? ? ? ?Diabetes Screening due??08/13/19??and every?  ??? ? ? ?Hypertension Management-Education due??08/13/19??and every 1??year(s)  ??? ? ? ?Influenza Vaccine due??08/13/19??and every?  ??? ? ? ?Zoster Vaccine due??08/13/19??and every 100??year(s)  ??? ??Due In Future?  ??? ? ? ?Colorectal Screening not due until??09/05/19??and every 1??year(s)  ??? ? ? ?Obesity Screening not due until??01/01/20??and every 1??year(s)  ??? ? ? ?Breast Cancer Screening not due until??01/22/20??and every 2??year(s)  ??? ? ? ?Blood Pressure Screening not due until??08/12/20??and every 1??year(s)  ??? ? ? ?Body Mass Index Check not due until??08/12/20??and every 1??year(s)  ??? ? ? ?Depression Screening not due until??08/12/20??and every 1??year(s)  ??? ? ? ?Hypertension Management-Blood Pressure not due until??08/12/20??and every 1??year(s)  ???Satisfied?(in the past 1 year)  ??? ??Satisfied?  ??? ? ? ?ADL Screening on??08/13/19.??Satisfied by Harmon LPMARIANNE Jameel Aerial  ??? ? ? ?Blood Pressure Screening on??08/13/19.??Satisfied by Harmon LPMARIANNE Jameel Aerial  ??? ? ? ?Body Mass Index Check on??08/13/19.??Satisfied by Harmon LPMARIANNE, Jameel Aerial  ??? ? ? ?Colorectal Screening  on??09/05/18.??Satisfied by Kaushik Pope  ??? ? ? ?Depression Screening on??08/13/19.??Satisfied by Jameel Harmon LPN  ??? ? ? ?Hypertension Management-Blood Pressure on??08/13/19.??Satisfied by Jameel Harmon LPN Aerial  ??? ? ? ?Influenza Vaccine on??08/31/18.??Satisfied by Vandana Hwang LPN  ??? ? ? ?Obesity Screening on??08/13/19.??Satisfied by Jameel Harmon LPN Aerial  ?

## 2022-05-02 NOTE — HISTORICAL [facility] CERNER
This is a historical note converted from Cerreid. Formatting and pictures may have been removed.  Please reference Cerner for original formatting and attached multimedia. Chief Complaint  pt reports per aasi w/ reports of AMS this AM. d/cd from [facility] x4 days ago after admitted w/ covid and PNU. on monday was GCS14, today GCS9. 99% on 2L NC.  History of Present Illness  65-year-old lady with a past medical history of HTN, HLD, depression, DVT.? She was recently discharged from [facility] on the 16th which was 4 days ago after a almost 1 week stay there for COVID-19 pneumonitis and hypoxic respiratory failure eventually being discharged on home O2 due to desaturation with exertion.? She was treated with Actemra, 2 days of Remdesivir only due to being discontinued due to bradycardia, and also treated with decadron.  Today the patient has been brought in for altered mental status and confusion starting today.? There is not much information provided besides that.? Patient is unable to provide any history as she only responds yes or no to some questions and does not really follow much commands beside some simple commands such as open your mouth and squeeze my hands.  At this time patient is being admitted to us for work-up of?encephalopathy.  Review of Systems  Unable to obtain due to clinical?condition from the patient  Physical Exam  Vitals & Measurements  T:?36.7? ?C (Oral)? HR:?64(Peripheral)? HR:?65(Monitored)? RR:?17? BP:?172/89? SpO2:?96%?  General: In no acute distress,?moving around in bed  HEENT:?Dry oral mucosa, no scleral icterus  Chest: Clear to auscultation bilaterally  Heart: S1, S2, no appreciable murmur  Abdomen:?Soft, nontender, BS +  Extremities: Warm, no lower extremity edema  Neuro:?Alert,?only responds yes or no to some questions, able to follow commands by only opening mouth and squeezing hand?but no notable?focal weakness?more so generalized weakness,?unable to follow any other  commands  Assessment/Plan  Orders:  amLODIPine, 10 mg, form: Tab, Oral, Daily, first dose 08/21/21 9:00:00 CDT  citalopram, 40 mg, form: Tab, Oral, Daily, first dose 08/21/21 9:00:00 CDT  Dextrose 5% with 0.45% NaCl intravenous solution 1,000 mL, 1,000 mL, 1,000 mL, IV, 100 mL/hr, start date 08/20/21 17:53:00 CDT, 2.31, m2  gabapentin, 300 mg, form: Cap, Oral, TID, first dose 08/20/21 22:00:00 CDT  pantoprazole, 40 mg, form: Tab-EC, Oral, Daily, first dose 08/21/21 6:00:00 CDT  rivaroxaban, 20 mg, form: Tab, Oral, Daily, first dose 08/21/21 9:00:00 CDT  SIRS  Acute encephalopathy-COVID-19 versus metabolic  Acute kidney injury  Intravascular volume depletion/dehydration  Hypernatremia  Transaminitis  Recent COVID-19 infection-positive test August 2  Acute on chronic hypoxemic respiratory failure  ?  History of: DVT, HTN, HLD  ?  ?  CT head negative.? If it does not improve plan to repeat CT head in 48 hours?and can consider doing lumbar puncture at that time as well.? Suspicion for meningitis at this time is low.  Hopefully she improved with fluid resuscitation as her lab work indicates her being quite dehydrated and oral mucosa is dry.  Continue with Rocephin and vancomycin for now as started in ER.? Received Actemra August 11.? Follow blood cultures.  Fluid bolus given in ER.? Start on D5 half-normal 100 mL/h.  Wean oxygen therapy as tolerated.? Recently discharged on 2 L for desaturation on exertion only.? CXR appears improved.  Hold home Lasix which may have contributed to her becoming dehydrated.  Continue Xarelto 20 for now, may be able to discontinue this if history of remote DVT not sure when she was diagnosed at this time  ?  DVT prophylaxis: Xarelto 20   Problem List/Past Medical History  Ongoing  Arthritis  Deep vein thrombosis (DVT)  Degenerative disk disease  HTN (hypertension)  HTN - Hypertension  Morbid obesity  Pain  Historical  No qualifying data  Procedure/Surgical History  Introduction of  Remdesivir Anti-infective into Peripheral Vein, Percutaneous Approach, [employer] (08/09/2021)  Repair Face Subcutaneous Tissue and Fascia, Open Approach (02/10/2021)  Simple repair of superficial wounds of face, ears, eyelids, nose, lips and/or mucous membranes; 12.6 cm to 20.0 cm (02/10/2021)  Dressing of Right Hand using Bandage (05/22/2017)  Initial treatment, first degree burn, when no more than local treatment is required (05/22/2017)  Tonsillectomy   Medications  Inpatient  amLODIPine, 10 mg= 2 tab(s), Oral, Daily  cefTRIAXone  citalopram 20 mg oral tablet, 40 mg= 2 tab(s), Oral, Daily  D5W-1/2NS 1000 mL 1,000 mL, 1000 mL, IV  gabapentin 300 mg oral capsule, 300 mg= 1 cap(s), Oral, TID  lidocaine 1% injectable solution, 20 mL, EPI, Once  NS 1,000 mL, 1000 mL, IV  Pantoprazole 40 mg ORAL EC-Tablet, 40 mg= 1 tab(s), Oral, Daily  vancomycin  Xarelto, 20 mg= 2 tab(s), Oral, Daily  Home  albuterol 2.5 mg/3 mL (0.083%) inhalation solution, See Instructions, 11 refills  baclofen 20 mg oral tablet, 20 mg= 1 tab(s), Oral, TID, 3 refills  citalopram 40 mg oral tablet, 40 mg= 1 tab(s), Oral, Daily, 3 refills  furosemide 20 mg oral tablet, See Instructions, 3 refills  gabapentin 300 mg oral capsule, 300 mg= 1 cap(s), Oral, TID, 11 refills  guaifenesin 600 mg oral tablet, extended release, 600 mg= 1 tab(s), Oral, q12hr  lidocaine 3% topical cream, See Instructions, 1 refills  Medrol 4 mg oral tablet, 1 packet(s), Oral, As Directed  Nebulizer Machine, See Instructions  Norvasc 10 mg oral tablet, 10 mg= 1 tab(s), Oral, Daily, 3 refills  Pantoprazole 40 mg ORAL EC-Tablet, 40 mg= 1 tab(s), Oral, Daily  ProAir HFA 90 mcg/inh inhalation aerosol with adapter, 2 puff(s), INH, q6hr, PRN, 11 refills  rollator with seat, See Instructions,? ?Not taking: Last Dose Date/Time Unknown  simvastatin 20 mg oral tablet, 20 mg= 1 tab(s), Oral, Once a day (at bedtime), 3 refills  simvastatin 20 mg oral tablet, 20 mg= 1 tab(s),  Oral, Once a day (at bedtime), 3 refills  simvastatin 20 mg oral tablet, 20 mg= 1 tab(s), Oral, Once a day (at bedtime), 3 refills  Xarelto 20mg Tablet, See Instructions, 3 refills  Allergies  aspirin  penicillins  traMADol  Social History  Abuse/Neglect  No, 08/09/2021  No, No, Yes, 08/02/2021  Alcohol - Denies Alcohol Use, 05/19/2012  Never, 08/31/2018  Employment/School - Not employed or in school, 02/06/2013  disability, Work/School description: disabled. Highest education level: High school. Operates hazardous equipment: No., 12/09/2016  Exercise - Does not exercise, 02/06/2013  Self assessment: Fair condition., 12/07/2015  Home/Environment - No Risk, 02/06/2013  Lives with Alone. Living situation: Home/Independent. Alcohol abuse in household: No. Substance abuse in household: No. Smoker in household: No. Injuries/Abuse/Neglect in household: No. Feels unsafe at home: No. Safe place to go: Yes. Family/Friends available for support: Yes. Concern for family members at home: No. Major illness in household: No. Financial concerns: No. TV/Computer concerns: No., 12/09/2016  Nutrition/Health - No Risk, 02/06/2013  Diabetic, 12/07/2015  Sexual - No Risk, 02/06/2013  Substance Use - Denies Substance Abuse, 02/06/2013  Never, 12/07/2015  Tobacco - Denies Tobacco Use, 05/19/2012  Never (less than 100 in lifetime), N/A, 08/20/2021  Never (less than 100 in lifetime), N/A, 08/09/2021  Never (less than 100 in lifetime), N/A, 08/02/2021  Family History  Acute myocardial infarction.: Mother.  Hypertension.: Mother and Father.  Immunizations  Vaccine Date Status Comments   tetanus/diphtheria/pertussis, acel(Tdap) 05/22/2017 Given other (see comment)   Lab Results  General Lab  BUN:?45.6 mg/dL?High (08/20/21 14:07:00)  Creatinine:?1.28 mg/dL?High (08/20/21 14:07:00)  Glucose Lvl:?169 mg/dL?High (08/20/21 14:07:00)  Hct: 45.3 % (08/20/21 13:05:00)  Hgb: 13.8 gm/dL (08/20/21 13:05:00)  INR:?1.6?High (08/20/21 13:05:00)  WBC:?13.4  x10(3)/mcL?High (08/20/21 13:05:00)  Platelet:?403 x10(3)/mcL?High (08/20/21 13:05:00)  Potassium Lvl: 4.4 mmol/L (08/20/21 14:07:00)  Sodium Lvl:?149 mmol/L?High (08/20/21 14:07:00)  ?  Medications (9) Active  Scheduled: (7)  amlodipine 5 mg Tab UD ?10 mg 2 tab(s), Oral, Daily  cefTRIAXone ?2 gm 1 EA, IV Piggyback, q24hr  citalopram 20 mg Tab UD ?40 mg 2 tab(s), Oral, Daily  gabapentin 300 mg Cap UD ?300 mg 1 cap(s), Oral, TID  pantoprazole 40 mg EC Tab ?40 mg 1 tab(s), Oral, Daily  rivaroxaban 10 mg Tab UD ?20 mg 2 tab(s), Oral, Daily  vancomycin ?1,500 mg 6 EA, IV Piggyback, q24hr  Continuous: (2)  D5W-1/2NS 1,000 mL ?1,000 mL, IV, 100 mL/hr  sodium chloride 0.9% 1,000 mL ?1,000 mL, IV, 1,000 mL/hr  PRN: (0)  ?  Accession:?[id number]  Order:?CT Head W/O Contrast  Report Dt/Tm:?08/20/2021 14:04  Report:?  ?  Clinical History:  Altered level of consciousness  ?  Reference:  10 February 2021  ?  Technique:  CT imaging of the head performed from the skull base to the vertex  without intravenous contrast. DLP 1241 mGycm. Automatic exposure  control, adjustment of mA/kV or iterative reconstruction technique was  used to reduce radiation.  ?  Findings:  There is no acute cortical infarct, hemorrhage or mass lesion. There  is no new parenchymal attenuation abnormality. Ventricular size is  stable.?  ?  There is paranasal sinus inflammation. The mastoids are clear.  ?  Impression:  No acute intracranial findings or significant interval change compared  to February 2021.  ?  ?  Accession:?[id number]  Order:?XR Chest 1 View  Report Dt/Tm:?08/20/2021 13:14  Report:?  ?  CLINICAL: ?Dyspnea.  ?  COMPARISON: August 10, 2021.  ?  FINDINGS: ?Cardiopericardial silhouette is within normal limits.  Bilateral lungs improved aeration and congestive changes. Residual  changes mostly involve the left lung. No significant fluid  accumulation within the pleural spaces. ?  ?  IMPRESSION:  ?  Improvement.  ?  ?  ?

## 2022-05-02 NOTE — HISTORICAL [facility] CERNER
This is a historical note converted from Bryan. Formatting and pictures may have been removed.  Please reference Cerreid for original formatting and attached multimedia. Admit and Discharge Dates  Admit Date: 08/20/2021  Discharge Date: 08/27/2021  Physicians  Attending Physician - Jermaine HUYNH, Rodri JETT  Admitting Physician - Jermaine HUYNH, Rodri JETT  Primary Care Physician - Ernesto HUYNH, Gem  Discharge Diagnosis  Acute dehydration?E86.0  Acute metabolic encephalopathy?G93.41  Acute renal failure (ARF)?N17.9  Altered mental status?[id number]  SIRS (systemic inflammatory response syndrome)?R65.10  Recent COVID-19 pneumonitis with hypoxemia-improved  Suspected superimposed bacterial pneumonia  Sepsis secondary to above-improved  Acute encephalopathy-Covid related versus metabolic-improved, back to baseline  Severe physical deconditioning secondary to recent COVID-19  Acute kidney injury secondary to dehydration/intravascular volume depletion  Hypernatremia-improved  Hyperchloremic metabolic acidosis-mild  Essential HTN-stable  HLD  Depression  History of DVT on anticoagulation  Surgical Procedures  No procedures recorded for this visit.  Immunizations  No immunizations recorded for this visit.  Hospital Course  ?  65-year-old female with significant history of HTN, HLD, depression, DVT for which she is on anticoagulation.? Patient was recently diagnosed with COVID-19 pneumonitis with associated hypoxemic respiratory failure and was admitted to outlying facility for almost a week and was discharged on August 16.? She was discharged on home O2.? She was then treated with remdesivir which was later discontinued secondary to bradycardia, Decadron and Actemra.? Patient presented back to the ED on 8/20 with severe physical deconditioning, confusion.? Patient was admitted to hospitalist service for encephalopathy work-up.? Labs revealed acute kidney injury.? Patient appeared volume depleted/dehydrated with  associated hypernatremia which is most likely the cause for encephalopathy.? She was initiated on IV fluids.? CT head was negative.? No focal deficits.? Suspicion for meningitis low.? Patient also was initiated on IV antibiotics given suspicion for superimposed bacterial infection, less likely.? Cultures ordered.? Chest x-ray actually showed improvement.? Therapy services consulted.? Patients mentation improved.? Therapy services recommended placement-skilled nursing facility.? IV fluids later discontinued once volume status optimized/renal function improved.Remaining symptomatically stable awaiting placement.? Cefdinir continued for suspected CAP with plans to complete the course on 8/28.? Respiratory status continues to be stable on room air.? Afebrile, hemodynamically stable with no leukocytosis.? No more encephalopathic.? This would have been secondary to metabolic reasons.? Renal function stable off IV fluids.? Home dose Lasix being continued.? Patient accepted to skilled nursing facility on 8/27.? Hemodynamic stable.? No new complaints.? Mentation intact.? She was discharged to skilled nursing facility in stable condition on 8/27.? Discharge medications per med rec.patient reported hallucinations with gabapentin and therefore this was discontinued.? Continue antibiotics for 2 more days to complete the course for pneumonia.? Follow-up with PCP after DC from skilled nursing facility.  Time Spent on discharge  35 mnts  Objective  Vitals & Measurements  T:?36.6? ?C (Oral)? TMIN:?36.6? ?C (Oral)? TMAX:?36.8? ?C (Oral)? HR:?64(Peripheral)? RR:?18? BP:?139/68? SpO2:?94%?  Physical Exam  General: No acute distress.??  HENT: ?Normocephalic,?  Respiratory:??Respirations are non-labored. ?  Cardiovascular: ?Normal rate, Regular rhythm, No edema. ?  Gastrointestinal: ?Soft, Non-tender,  Integumentary:Warm  Neurologic: ?Alert, Oriented,  Psychiatric: ?Cooperative,??  Patient Discharge Condition  Improved and  stable  Discharge Disposition  Skilled nursing facility   Discharge Medication Reconciliation  Prescribed  cefdinir (cefdinir 300 mg oral capsule)?300 mg, Oral, BID  Continue  Misc Prescription (Nebulizer Machine)?See Instructions  Misc Prescription (rollator with seat)?See Instructions  albuterol (ProAir HFA 90 mcg/inh inhalation aerosol with adapter)?2 puff(s), INH, q6hr, PRN for wheezing  albuterol (albuterol 2.5 mg/3 mL (0.083%) inhalation solution)?See Instructions  amLODIPine (Norvasc 10 mg oral tablet)?10 mg, Oral, Daily  citalopram (citalopram 40 mg oral tablet)?40 mg, Oral, Daily  pantoprazole (Pantoprazole 40 mg ORAL EC-Tablet)?40 mg, Oral, Daily  rivaroxaban (Xarelto 20mg Tablet)?See Instructions  simvastatin (simvastatin 20 mg oral tablet)?20 mg, Oral, Once a day (at bedtime)  Discontinue  baclofen (baclofen 20 mg oral tablet)?20 mg, Oral, TID  furosemide (furosemide 20 mg oral tablet)?20 mg, Oral, Daily, PRN swelling  gabapentin (gabapentin 300 mg oral capsule)?300 mg, Oral, TID  guaiFENesin (guaifenesin 600 mg oral tablet, extended release)?600 mg, Oral, q12hr  lidocaine topical (lidocaine 3% topical cream)?See Instructions  simvastatin (simvastatin 20 mg oral tablet)?20 mg, Oral, Once a day (at bedtime)  simvastatin (simvastatin 20 mg oral tablet)?20 mg, Oral, Once a day (at bedtime)  simvastatin (simvastatin 20 mg oral tablet)?20 mg, Oral, Once a day (at bedtime)  simvastatin (simvastatin 20 mg oral tablet)?20 mg, Oral, Once a day (at bedtime)  Education and Orders Provided  Understanding Coronavirus Disease 2019 (COVID-19) Patient Education-updated 7_20_21 (Custom)  Acute Kidney Injury, Adult  Discharge - 08/27/21 8:48:00 CDT, Post-Acute Services/Facilities, Give all scheduled vaccinations prior to discharge. DC TO SNF?  Discharge Activity - Activity as Tolerated?  Discharge Diet - Cardiac?  Follow up  ATTENTION: Discharging Nurse: If patient is discharged back home, please notify her current HH:  [facility] @ [phone] at the time she is discharged.  Report to Emergency Department if symptoms return or worsen  Gem Orozco, within 1 week  Car Seat Challenge  No Qualifying Data

## 2022-05-02 NOTE — HISTORICAL [facility] CERNER
This is a historical note converted from Bryan. Formatting and pictures may have been removed.  Please reference Bryan for original formatting and attached multimedia. Chief Complaint  wants a rescue inhaler fell and jurt right shoulder  History of Present Illness  63 yo bf with decreased abduction of rt shoulder htn mo depression chol cri hx dvt? osteopenia  Review of Systems  neg cv, neg pulm, neg gi gu ros as in hpi  Physical Exam  Vitals & Measurements  T:?36.7? ?C (Oral)? HR:?69(Peripheral)? RR:?18? BP:?149/79?  HT:?164.00?cm? WT:?138.800?kg? BMI:?51.61?  aax4 nad  ricco eomi  cv rrr s1s2 no mgr  lungs cta no cr or whz  abd nt soft  ext no edema  neuro intact  Assessment/Plan  1.?Arthritis?M19.90  ?x ray ortho  ?  2.?HTN - Hypertension?I10  ?usu controlled  Ordered:  CBC w/ Auto Diff, Routine collect, *Est. 08/09/21 3:00:00 CDT, Blood, Order for future visit, *Est. Stop date 08/09/21 3:00:00 CDT, Lab Collect, HTN - Hypertension, 02/09/21 9:44:00 CST  Clinic Follow up, *Est. 08/09/21 3:00:00 CDT, Order for future visit, HTN - Hypertension, [facility]  Comprehensive Metabolic Panel, Routine collect, *Est. 08/09/21 3:00:00 CDT, Blood, Order for future visit, *Est. Stop date 08/09/21 3:00:00 CDT, Lab Collect, HTN - Hypertension, 02/09/21 9:44:00 CST  Free T4, Routine collect, *Est. 08/09/21 3:00:00 CDT, Blood, Order for future visit, *Est. Stop date 08/09/21 3:00:00 CDT, Lab Collect, HTN - Hypertension, 02/09/21 9:44:00 CST  Hemoglobin A1C [facility], Routine collect, *Est. 08/09/21 3:00:00 CDT, Blood, Order for future visit, *Est. Stop date 08/09/21 3:00:00 CDT, Lab Collect, HTN - Hypertension, 02/09/21 9:44:00 CST  Lipid Panel, Routine collect, *Est. 08/09/21 3:00:00 CDT, Blood, Order for future visit, *Est. Stop date 08/09/21 3:00:00 CDT, Lab Collect, HTN - Hypertension, 02/09/21 9:44:00 CST  Office/Outpatient Visit Level 4 Established [id number] PC, HTN - Hypertension, [facility], 02/09/21 9:43:00 CST  Thyroid  Stimulating Hormone, Routine collect, *Est. 08/09/21 3:00:00 CDT, Blood, Order for future visit, *Est. Stop date 08/09/21 3:00:00 CDT, Lab Collect, HTN - Hypertension, 02/09/21 9:44:00 CST  ?  3.?Morbid obesity?E66.01  ?dash  ?  4.?HLD (hyperlipidemia)?E78.5  ?on statin  ?  Referrals  Clinic Follow up, *Est. 08/09/21 3:00:00 CDT, Order for future visit, HTN - Hypertension, [facility]   Problem List/Past Medical History  Ongoing  Arthritis  Deep vein thrombosis (DVT)  Degenerative disk disease  HTN (hypertension)  HTN - Hypertension  Morbid obesity  Pain  Historical  No qualifying data  Procedure/Surgical History  Dressing of Right Hand using Bandage (05/22/2017)  Initial treatment, first degree burn, when no more than local treatment is required (05/22/2017)  Tonsillectomy   Medications  albuterol 2.5 mg/3 mL (0.083%) inhalation solution, See Instructions, 11 refills  baclofen 10 mg oral tablet, 10 mg= 1 tab(s), Oral, TID, 11 refills  citalopram 40 mg oral tablet, 40 mg= 1 tab(s), Oral, Daily, 3 refills  DuoNeb 0.5 mg-2.5 mg/3 mL inhalation solution, 3 mL, NEB, Once-NOW,? ?Not taking: Last Dose Date/Time Unknown  furosemide 20 mg oral tablet, See Instructions, 3 refills  gabapentin 300 mg oral capsule, 300 mg= 1 cap(s), Oral, TID, 11 refills  lidocaine 3% topical cream, See Instructions, 1 refills  Nebulizer Machine, See Instructions,? ?Not taking: Last Dose Date/Time Unknown  Norvasc 10 mg oral tablet, 10 mg= 1 tab(s), Oral, Daily, 3 refills  ProAir HFA 90 mcg/inh inhalation aerosol with adapter, 2 puff(s), INH, q6hr, PRN, 11 refills  rollator with seat, See Instructions,? ?Not taking: Last Dose Date/Time Unknown  simvastatin 20 mg oral tablet, 20 mg= 1 tab(s), Oral, Once a day (at bedtime), 3 refills  Xarelto 20mg Tablet, See Instructions, 3 refills  Allergies  aspirin  penicillins  traMADol  Social History  Abuse/Neglect  No, No, Yes, 02/09/2021  Alcohol - Denies Alcohol Use, 05/19/2012  Never,  08/31/2018  Employment/School - Not employed or in school, 02/06/2013  disability, Work/School description: disabled. Highest education level: High school. Operates hazardous equipment: No., 12/09/2016  Exercise - Does not exercise, 02/06/2013  Self assessment: Fair condition., 12/07/2015  Home/Environment - No Risk, 02/06/2013  Lives with Alone. Living situation: Home/Independent. Alcohol abuse in household: No. Substance abuse in household: No. Smoker in household: No. Injuries/Abuse/Neglect in household: No. Feels unsafe at home: No. Safe place to go: Yes. Family/Friends available for support: Yes. Concern for family members at home: No. Major illness in household: No. Financial concerns: No. TV/Computer concerns: No., 12/09/2016  Nutrition/Health - No Risk, 02/06/2013  Diabetic, 12/07/2015  Sexual - No Risk, 02/06/2013  Substance Use - Denies Substance Abuse, 02/06/2013  Never, 12/07/2015  Tobacco - Denies Tobacco Use, 05/19/2012  Never (less than 100 in lifetime), N/A, 02/09/2021  Family History  Acute myocardial infarction.: Mother.  Hypertension.: Mother and Father.  Immunizations  Vaccine Date Status Comments   tetanus/diphtheria/pertussis, acel(Tdap) 05/22/2017 Given other (see comment)   Health Maintenance  Health Maintenance  ???Pending?(in the next year)  ??? ??OverDue  ??? ? ? ?Colorectal Screening due??09/05/19??and every 1??year(s)  ??? ? ? ?Influenza Vaccine due??10/01/20??and every 1??day(s)  ??? ??Due?  ??? ? ? ?Alcohol Misuse Screening due??01/02/21??and every 1??year(s)  ??? ? ? ?Hypertension Management-Education due??02/09/21??and every 1??year(s)  ??? ? ? ?Medicare Annual Wellness Exam due??02/09/21??and every 1??year(s)  ??? ? ? ?Pneumococcal Vaccine due??02/09/21??Unknown Frequency  ??? ? ? ?Zoster Vaccine due??02/09/21??Unknown Frequency  ??? ??Due In Future?  ??? ? ? ?Diabetes Screening not due until??08/05/21??and every 1??year(s)  ??? ? ? ?Hypertension Management-BMP not due  until??08/05/21??and every 1??year(s)  ??? ? ? ?Obesity Screening not due until??01/01/22??and every 1??year(s)  ??? ? ? ?Advance Directive not due until??01/02/22??and every 1??year(s)  ??? ? ? ?Cognitive Screening not due until??01/02/22??and every 1??year(s)  ??? ? ? ?Fall Risk Assessment not due until??01/02/22??and every 1??year(s)  ??? ? ? ?Functional Assessment not due until??01/02/22??and every 1??year(s)  ???Satisfied?(in the past 1 year)  ??? ??Satisfied?  ??? ? ? ?ADL Screening on??02/09/21.??Satisfied by Harmon LPN, Jameel Aerial  ??? ? ? ?Advance Directive on??02/09/21.??Satisfied by Harmon LPN, Jameel Aerial  ??? ? ? ?Blood Pressure Screening on??02/09/21.??Satisfied by Harmon LPN, Jameel Aerial  ??? ? ? ?Body Mass Index Check on??02/09/21.??Satisfied by Harmon LPN, Jameel Aerial  ??? ? ? ?Bone Density Screening on??08/14/20.??Satisfied by Ana Cristina Lafleur  ??? ? ? ?Breast Cancer Screening on??08/14/20.??Satisfied by Gisela Raymundo  ??? ? ? ?Cognitive Screening on??02/09/21.??Satisfied by Harmon LPN, Jameel Aerial  ??? ? ? ?Depression Screening on??02/09/21.??Satisfied by Harmon LPN, Jameel Aerial  ??? ? ? ?Diabetes Screening on??08/05/20.??Satisfied by Araseli Parra  ??? ? ? ?Fall Risk Assessment on??02/09/21.??Satisfied by Harmon LPN, Jameel Aerial  ??? ? ? ?Functional Assessment on??02/09/21.??Satisfied by Harmon LPN, Jameel Aerial  ??? ? ? ?Hypertension Management-Blood Pressure on??02/09/21.??Satisfied by Faustino ALVAREZ, Jameel Aerial  ??? ? ? ?Influenza Vaccine on??02/09/21.??Satisfied by Faustino ALVAREZ, Jameel Aerial  ??? ? ? ?Lipid Screening on??08/05/20.??Satisfied by Araseli Parra  ??? ? ? ?Obesity Screening on??02/09/21.??Satisfied by Faustino ALVAREZ, Jameel Aerial  ?

## 2022-09-15 ENCOUNTER — DOCUMENTATION ONLY (OUTPATIENT)
Dept: ADMINISTRATIVE | Facility: HOSPITAL | Age: 67
End: 2022-09-15
Payer: MEDICARE

## 2022-10-04 ENCOUNTER — OFFICE VISIT (OUTPATIENT)
Dept: INTERNAL MEDICINE | Facility: CLINIC | Age: 67
End: 2022-10-04
Payer: MEDICARE

## 2022-10-04 ENCOUNTER — PATIENT MESSAGE (OUTPATIENT)
Dept: INTERNAL MEDICINE | Facility: CLINIC | Age: 67
End: 2022-10-04

## 2022-10-04 VITALS
HEART RATE: 74 BPM | DIASTOLIC BLOOD PRESSURE: 81 MMHG | RESPIRATION RATE: 18 BRPM | HEIGHT: 66 IN | SYSTOLIC BLOOD PRESSURE: 156 MMHG | TEMPERATURE: 98 F | WEIGHT: 293 LBS | BODY MASS INDEX: 47.09 KG/M2

## 2022-10-04 DIAGNOSIS — K21.9 GASTRO-ESOPHAGEAL REFLUX DISEASE WITHOUT ESOPHAGITIS: Chronic | ICD-10-CM

## 2022-10-04 DIAGNOSIS — Z86.16 HISTORY OF COVID-19: ICD-10-CM

## 2022-10-04 DIAGNOSIS — Z23 NEEDS FLU SHOT: ICD-10-CM

## 2022-10-04 DIAGNOSIS — E78.49 OTHER HYPERLIPIDEMIA: Chronic | ICD-10-CM

## 2022-10-04 DIAGNOSIS — Z86.718 PERSONAL HISTORY OF OTHER VENOUS THROMBOSIS AND EMBOLISM: Chronic | ICD-10-CM

## 2022-10-04 DIAGNOSIS — R06.02 SOB (SHORTNESS OF BREATH): Primary | ICD-10-CM

## 2022-10-04 DIAGNOSIS — E66.01 CLASS 3 SEVERE OBESITY DUE TO EXCESS CALORIES WITH SERIOUS COMORBIDITY AND BODY MASS INDEX (BMI) OF 45.0 TO 49.9 IN ADULT: Chronic | ICD-10-CM

## 2022-10-04 DIAGNOSIS — I10 PRIMARY HYPERTENSION: Chronic | ICD-10-CM

## 2022-10-04 DIAGNOSIS — Z86.16 PERSONAL HISTORY OF COVID-19: Chronic | ICD-10-CM

## 2022-10-04 PROBLEM — E78.5 HYPERLIPIDEMIA, UNSPECIFIED: Status: ACTIVE | Noted: 2021-08-31

## 2022-10-04 PROBLEM — E66.813 CLASS 3 SEVERE OBESITY DUE TO EXCESS CALORIES WITH SERIOUS COMORBIDITY AND BODY MASS INDEX (BMI) OF 45.0 TO 49.9 IN ADULT: Chronic | Status: ACTIVE | Noted: 2022-10-04

## 2022-10-04 PROCEDURE — G0008 ADMIN INFLUENZA VIRUS VAC: HCPCS | Mod: PBBFAC

## 2022-10-04 PROCEDURE — 3066F PR DOCUMENTATION OF TREATMENT FOR NEPHROPATHY: ICD-10-PCS | Mod: CPTII,,, | Performed by: NURSE PRACTITIONER

## 2022-10-04 PROCEDURE — 1160F RVW MEDS BY RX/DR IN RCRD: CPT | Mod: CPTII,,, | Performed by: NURSE PRACTITIONER

## 2022-10-04 PROCEDURE — 3077F SYST BP >= 140 MM HG: CPT | Mod: CPTII,,, | Performed by: NURSE PRACTITIONER

## 2022-10-04 PROCEDURE — 3079F PR MOST RECENT DIASTOLIC BLOOD PRESSURE 80-89 MM HG: ICD-10-PCS | Mod: CPTII,,, | Performed by: NURSE PRACTITIONER

## 2022-10-04 PROCEDURE — 3060F PR POS MICROALBUMINURIA RESULT DOCUMENTED/REVIEW: ICD-10-PCS | Mod: CPTII,,, | Performed by: NURSE PRACTITIONER

## 2022-10-04 PROCEDURE — 1101F PT FALLS ASSESS-DOCD LE1/YR: CPT | Mod: CPTII,,, | Performed by: NURSE PRACTITIONER

## 2022-10-04 PROCEDURE — 3008F PR BODY MASS INDEX (BMI) DOCUMENTED: ICD-10-PCS | Mod: CPTII,,, | Performed by: NURSE PRACTITIONER

## 2022-10-04 PROCEDURE — 3288F FALL RISK ASSESSMENT DOCD: CPT | Mod: CPTII,,, | Performed by: NURSE PRACTITIONER

## 2022-10-04 PROCEDURE — 1125F PR PAIN SEVERITY QUANTIFIED, PAIN PRESENT: ICD-10-PCS | Mod: CPTII,,, | Performed by: NURSE PRACTITIONER

## 2022-10-04 PROCEDURE — 3008F BODY MASS INDEX DOCD: CPT | Mod: CPTII,,, | Performed by: NURSE PRACTITIONER

## 2022-10-04 PROCEDURE — 3060F POS MICROALBUMINURIA REV: CPT | Mod: CPTII,,, | Performed by: NURSE PRACTITIONER

## 2022-10-04 PROCEDURE — 1159F PR MEDICATION LIST DOCUMENTED IN MEDICAL RECORD: ICD-10-PCS | Mod: CPTII,,, | Performed by: NURSE PRACTITIONER

## 2022-10-04 PROCEDURE — 99214 OFFICE O/P EST MOD 30 MIN: CPT | Mod: PBBFAC,25 | Performed by: NURSE PRACTITIONER

## 2022-10-04 PROCEDURE — 99214 PR OFFICE/OUTPT VISIT, EST, LEVL IV, 30-39 MIN: ICD-10-PCS | Mod: 25,S$PBB,, | Performed by: NURSE PRACTITIONER

## 2022-10-04 PROCEDURE — 3288F PR FALLS RISK ASSESSMENT DOCUMENTED: ICD-10-PCS | Mod: CPTII,,, | Performed by: NURSE PRACTITIONER

## 2022-10-04 PROCEDURE — 3066F NEPHROPATHY DOC TX: CPT | Mod: CPTII,,, | Performed by: NURSE PRACTITIONER

## 2022-10-04 PROCEDURE — 1160F PR REVIEW ALL MEDS BY PRESCRIBER/CLIN PHARMACIST DOCUMENTED: ICD-10-PCS | Mod: CPTII,,, | Performed by: NURSE PRACTITIONER

## 2022-10-04 PROCEDURE — 99214 OFFICE O/P EST MOD 30 MIN: CPT | Mod: 25,S$PBB,, | Performed by: NURSE PRACTITIONER

## 2022-10-04 PROCEDURE — 1125F AMNT PAIN NOTED PAIN PRSNT: CPT | Mod: CPTII,,, | Performed by: NURSE PRACTITIONER

## 2022-10-04 PROCEDURE — 1101F PR PT FALLS ASSESS DOC 0-1 FALLS W/OUT INJ PAST YR: ICD-10-PCS | Mod: CPTII,,, | Performed by: NURSE PRACTITIONER

## 2022-10-04 PROCEDURE — 1159F MED LIST DOCD IN RCRD: CPT | Mod: CPTII,,, | Performed by: NURSE PRACTITIONER

## 2022-10-04 PROCEDURE — 3077F PR MOST RECENT SYSTOLIC BLOOD PRESSURE >= 140 MM HG: ICD-10-PCS | Mod: CPTII,,, | Performed by: NURSE PRACTITIONER

## 2022-10-04 PROCEDURE — 3079F DIAST BP 80-89 MM HG: CPT | Mod: CPTII,,, | Performed by: NURSE PRACTITIONER

## 2022-10-04 RX ORDER — ALBUTEROL SULFATE 0.63 MG/3ML
0.63 SOLUTION RESPIRATORY (INHALATION) EVERY 6 HOURS PRN
Qty: 90 ML | Refills: 2 | Status: SHIPPED | OUTPATIENT
Start: 2022-10-04 | End: 2023-01-02

## 2022-10-04 RX ORDER — PANTOPRAZOLE SODIUM 40 MG/1
40 TABLET, DELAYED RELEASE ORAL DAILY
COMMUNITY
Start: 2021-08-16 | End: 2022-10-04 | Stop reason: SDUPTHER

## 2022-10-04 RX ORDER — RIVAROXABAN 20 MG/1
20 TABLET, FILM COATED ORAL DAILY
Qty: 90 TABLET | Refills: 2 | Status: SHIPPED | OUTPATIENT
Start: 2022-10-04 | End: 2023-01-02

## 2022-10-04 RX ORDER — SIMVASTATIN 20 MG/1
20 TABLET, FILM COATED ORAL NIGHTLY
Qty: 90 TABLET | Refills: 2 | Status: SHIPPED | OUTPATIENT
Start: 2022-10-04 | End: 2023-01-19 | Stop reason: SDUPTHER

## 2022-10-04 RX ORDER — SIMVASTATIN 20 MG/1
20 TABLET, FILM COATED ORAL NIGHTLY
COMMUNITY
Start: 2022-08-11 | End: 2022-10-04 | Stop reason: SDUPTHER

## 2022-10-04 RX ORDER — RIVAROXABAN 20 MG/1
1 TABLET, FILM COATED ORAL DAILY
COMMUNITY
Start: 2022-08-11 | End: 2022-10-04 | Stop reason: SDUPTHER

## 2022-10-04 RX ORDER — FUROSEMIDE 20 MG/1
20 TABLET ORAL DAILY
COMMUNITY
Start: 2022-08-11 | End: 2022-10-04 | Stop reason: SDUPTHER

## 2022-10-04 RX ORDER — AMLODIPINE BESYLATE 10 MG/1
10 TABLET ORAL DAILY
Qty: 90 TABLET | Refills: 2 | Status: SHIPPED | OUTPATIENT
Start: 2022-10-04 | End: 2023-01-19 | Stop reason: SDUPTHER

## 2022-10-04 RX ORDER — ALBUTEROL SULFATE 90 UG/1
2 AEROSOL, METERED RESPIRATORY (INHALATION) EVERY 6 HOURS PRN
Qty: 18 G | Refills: 1 | Status: SHIPPED | OUTPATIENT
Start: 2022-10-04 | End: 2024-03-12 | Stop reason: SDUPTHER

## 2022-10-04 RX ORDER — FUROSEMIDE 20 MG/1
20 TABLET ORAL DAILY
Qty: 90 TABLET | Refills: 2 | Status: SHIPPED | OUTPATIENT
Start: 2022-10-04 | End: 2023-01-19 | Stop reason: SDUPTHER

## 2022-10-04 RX ORDER — PANTOPRAZOLE SODIUM 40 MG/1
40 TABLET, DELAYED RELEASE ORAL DAILY
Qty: 90 TABLET | Refills: 2 | Status: SHIPPED | OUTPATIENT
Start: 2022-10-04 | End: 2023-04-04 | Stop reason: SDUPTHER

## 2022-10-04 RX ORDER — AMLODIPINE BESYLATE 10 MG/1
10 TABLET ORAL DAILY
COMMUNITY
Start: 2022-08-11 | End: 2022-10-04 | Stop reason: SDUPTHER

## 2022-10-04 NOTE — ASSESSMENT & PLAN NOTE
Patient followed by Cardiology  B/P: 150/74  UA Creatinine: 49.4  UA Microalbumin: 21.2  [id number]  Continue Amlodipine 10 mg daily, Furosemide 20 mg daily  DASH diet  Encouraged home blood pressure monitoring

## 2022-10-04 NOTE — PROGRESS NOTES
Subjective:       Patient ID: Leatha Martinez is a 67 y.o. female.    Chief Complaint: Follow-up, Results, and Knee Pain (Left  knee pain)    Patient has diagnosis of HTN, HLD, Hx of DVT, Hx of COVID-19. Patient seen in clinic today for stating increased SOB at times. Patient has history of COVID-19, states SOB started after diagnosis of COVID. Currently prescribed Albuterol inhaler but states nebulizer works better. States she was prescribed a nebulizer previously and needs another one. Patient denies chest pain, any other acute complaints. Patient currently followed by Cardiology Clinic. Patient seen in clinic on 03/04/2022 for left knee pain. Left knee X-ray indicated degenerative changes. Patient referred to Orthopedic Clinic, had appointment on 03/15/2022 but was a no show. Patient states she will call to reschedule appointment. Patient referred to GI for colonoscopy due to positive FIT, colonoscopy done on 4/14/2022 with recommended repeat in 3 years. Patient denies any other acute complaints.     Patient followed by Cardiology Clinic. Last appointment on 01/20/2022. Hx of HTN, Pulmonary HTN, unprovoked DVT (2014) on Xarelto, and obesity. Patient presented for cardiac risk ratification for a upcoming colonoscopy after having a positive fit test. Denied symptoms of chest pain, shortness of breath, dyspnea on exertion, lower extremity edema, or palpitations. Echocardiogram TTE in August 2021 showed mild concentric LVH and an EF of 50-55%. Lexiscan performed in October 2021 showed no ischemia no scar. Patient reported compliance with all medications. Patient is low to moderate risk for colonoscopy for cardiac risk stratification. Patient has follow up appointment scheduled for 01/19/2023.      Mammogram: 04/26/2022, negative  PAP: deferred at this time  FIT: 09/30/2021, positive  Colonoscopy: 04/14/2022, recommend repeat in 3 years  Bone Density: 08/14/2020, osteopenia  Medicare Wellness: N/A    Review of Systems    Constitutional: Negative.    HENT: Negative.     Eyes: Negative.    Respiratory:  Positive for shortness of breath.    Cardiovascular: Negative.    Gastrointestinal: Negative.    Endocrine: Negative.    Genitourinary: Negative.    Musculoskeletal: Negative.    Integumentary:  Negative.   Allergic/Immunologic: Negative.    Neurological: Negative.    Hematological: Negative.    Psychiatric/Behavioral: Negative.         Objective:      Physical Exam  Vitals reviewed.   Constitutional:       Appearance: Normal appearance. She is obese.   HENT:      Head: Normocephalic and atraumatic.      Mouth/Throat:      Mouth: Mucous membranes are moist.      Pharynx: Oropharynx is clear.   Eyes:      Extraocular Movements: Extraocular movements intact.      Conjunctiva/sclera: Conjunctivae normal.      Pupils: Pupils are equal, round, and reactive to light.   Cardiovascular:      Rate and Rhythm: Normal rate and regular rhythm.      Heart sounds: Normal heart sounds.   Pulmonary:      Effort: Pulmonary effort is normal.      Breath sounds: Normal breath sounds.   Abdominal:      General: Bowel sounds are normal.   Musculoskeletal:         General: Normal range of motion.      Cervical back: Normal range of motion.   Skin:     General: Skin is warm and dry.   Neurological:      Mental Status: She is alert and oriented to person, place, and time.   Psychiatric:         Mood and Affect: Mood normal.         Behavior: Behavior normal.       Assessment:       Problem List Items Addressed This Visit          Pulmonary    SOB (shortness of breath) - Primary     Nebulizer with Albuterol ordered         Relevant Orders    NEBULIZER KIT (SUPPLIES) FOR HOME USE       Cardiac/Vascular    Hypertension (Chronic)     Patient followed by Cardiology  B/P: 150/74  UA Creatinine: 49.4  UA Microalbumin: 21.2  [id number]  Continue Amlodipine 10 mg daily, Furosemide 20 mg daily  DASH diet  Encouraged home blood pressure monitoring          Relevant Orders    CBC Auto Differential    Comprehensive Metabolic Panel    Urinalysis, Reflex to Urine Culture Urine, Clean Catch    Microalbumin/Creatinine Ratio, Urine    Other hyperlipidemia (Chronic)     Continue Simvastatin 20 mg Qhs  Weight loss encouraged  Low fat/high fiber diet  Increase physical activity  Chol: 139  HDL: 33  Tri  LDL: 88         Relevant Orders    Lipid Panel       ID    Personal history of COVID-19 (Chronic)     Continue Albuterol Inhaler pen SOB when not at home  Start Albuterol Nebulizer prn SOB when home             Hematology    Personal history of other venous thrombosis and embolism (Chronic)     Continue Xarelto 20 mg daily  Followed by Cardiology Clinic            Endocrine    Class 3 severe obesity due to excess calories with serious comorbidity and body mass index (BMI) of 45.0 to 49.9 in adult (Chronic)     BMI: 47.98  TSH: ordered  Vitamin D level: ordered  HgbA1c: 6.3  Weight Loss Encouraged  Increase Physical Activity            GI    Gastro-esophageal reflux disease without esophagitis (Chronic)     Continue Pantoprazole  Avoid spicy foods  Remain in an upright position for at least 30 minutes after consuming meals          Other Visit Diagnoses       Needs flu shot        Relevant Orders    Influenza - Quadrivalent (Adjuvanted) (Completed)    History of COVID-19        Relevant Orders    NEBULIZER KIT (SUPPLIES) FOR HOME USE            Plan:    Patient to follow up in 6 months with labs to be done prior to appointment to reassess HLD, HTN.

## 2022-10-04 NOTE — ASSESSMENT & PLAN NOTE
BMI: 47.98  TSH: ordered  Vitamin D level: ordered  HgbA1c: 6.3  Weight Loss Encouraged  Increase Physical Activity

## 2022-10-04 NOTE — ASSESSMENT & PLAN NOTE
Continue Pantoprazole  Avoid spicy foods  Remain in an upright position for at least 30 minutes after consuming meals

## 2022-10-04 NOTE — ASSESSMENT & PLAN NOTE
Continue Simvastatin 20 mg Qhs  Weight loss encouraged  Low fat/high fiber diet  Increase physical activity  Chol: 139  HDL: 33  Tri  LDL: 88

## 2022-10-06 RX ORDER — NITROFURANTOIN 25; 75 MG/1; MG/1
100 CAPSULE ORAL 2 TIMES DAILY
Qty: 14 CAPSULE | Refills: 0 | Status: SHIPPED | OUTPATIENT
Start: 2022-10-06 | End: 2022-10-13

## 2022-10-06 RX ORDER — ERGOCALCIFEROL 1.25 MG/1
50000 CAPSULE ORAL
Qty: 12 CAPSULE | Refills: 1 | Status: SHIPPED | OUTPATIENT
Start: 2022-10-06 | End: 2023-01-04

## 2022-10-17 ENCOUNTER — TELEPHONE (OUTPATIENT)
Dept: INTERNAL MEDICINE | Facility: CLINIC | Age: 67
End: 2022-10-17
Payer: MEDICARE

## 2022-11-20 ENCOUNTER — HOSPITAL ENCOUNTER (EMERGENCY)
Facility: HOSPITAL | Age: 67
Discharge: HOME | End: 2022-11-21
Attending: EMERGENCY MEDICINE
Payer: MEDICARE

## 2022-11-20 ENCOUNTER — PATIENT MESSAGE (OUTPATIENT)
Dept: INTERNAL MEDICINE | Facility: CLINIC | Age: 67
End: 2022-11-20
Payer: MEDICARE

## 2022-11-20 VITALS
BODY MASS INDEX: 47.09 KG/M2 | SYSTOLIC BLOOD PRESSURE: 150 MMHG | DIASTOLIC BLOOD PRESSURE: 80 MMHG | WEIGHT: 293 LBS | OXYGEN SATURATION: 96 % | HEART RATE: 86 BPM | TEMPERATURE: 99 F | RESPIRATION RATE: 18 BRPM | HEIGHT: 66 IN

## 2022-11-20 DIAGNOSIS — R50.9 FEVER, UNSPECIFIED FEVER CAUSE: Primary | ICD-10-CM

## 2022-11-20 DIAGNOSIS — R74.8 ELEVATED LIVER ENZYMES: ICD-10-CM

## 2022-11-20 DIAGNOSIS — R05.9 COUGH: ICD-10-CM

## 2022-11-20 DIAGNOSIS — N12 PYELONEPHRITIS: ICD-10-CM

## 2022-11-20 DIAGNOSIS — N39.0 URINARY TRACT INFECTION WITHOUT HEMATURIA, SITE UNSPECIFIED: ICD-10-CM

## 2022-11-20 LAB
ALBUMIN SERPL-MCNC: 3.2 GM/DL (ref 3.4–4.8)
ALBUMIN/GLOB SERPL: 0.6 RATIO (ref 1.1–2)
ALP SERPL-CCNC: 350 UNIT/L (ref 40–150)
ALT SERPL-CCNC: 267 UNIT/L (ref 0–55)
APPEARANCE UR: ABNORMAL
AST SERPL-CCNC: 486 UNIT/L (ref 5–34)
BACTERIA #/AREA URNS AUTO: ABNORMAL /HPF
BASOPHILS # BLD AUTO: 0.03 X10(3)/MCL (ref 0–0.2)
BASOPHILS NFR BLD AUTO: 0.2 %
BILIRUB UR QL STRIP.AUTO: NEGATIVE MG/DL
BILIRUBIN DIRECT+TOT PNL SERPL-MCNC: 0.6 MG/DL
BUN SERPL-MCNC: 14.3 MG/DL (ref 9.8–20.1)
CALCIUM SERPL-MCNC: 9 MG/DL (ref 8.4–10.2)
CHLORIDE SERPL-SCNC: 105 MMOL/L (ref 98–107)
CO2 SERPL-SCNC: 18 MMOL/L (ref 23–31)
COLOR UR AUTO: YELLOW
CREAT SERPL-MCNC: 0.9 MG/DL (ref 0.55–1.02)
EOSINOPHIL # BLD AUTO: 0.01 X10(3)/MCL (ref 0–0.9)
EOSINOPHIL NFR BLD AUTO: 0.1 %
ERYTHROCYTE [DISTWIDTH] IN BLOOD BY AUTOMATED COUNT: 16.4 % (ref 11.5–17)
FLUAV AG UPPER RESP QL IA.RAPID: NOT DETECTED
FLUBV AG UPPER RESP QL IA.RAPID: NOT DETECTED
GFR SERPLBLD CREATININE-BSD FMLA CKD-EPI: >60 MLS/MIN/1.73/M2
GLOBULIN SER-MCNC: 5.4 GM/DL (ref 2.4–3.5)
GLUCOSE SERPL-MCNC: 144 MG/DL (ref 82–115)
GLUCOSE UR QL STRIP.AUTO: NEGATIVE MG/DL
HCT VFR BLD AUTO: 36.6 % (ref 37–47)
HGB BLD-MCNC: 11.5 GM/DL (ref 12–16)
IMM GRANULOCYTES # BLD AUTO: 0.07 X10(3)/MCL (ref 0–0.04)
IMM GRANULOCYTES NFR BLD AUTO: 0.6 %
KETONES UR QL STRIP.AUTO: NEGATIVE MG/DL
LEUKOCYTE ESTERASE UR QL STRIP.AUTO: ABNORMAL UNIT/L
LIPASE SERPL-CCNC: 20 U/L
LYMPHOCYTES # BLD AUTO: 0.7 X10(3)/MCL (ref 0.6–4.6)
LYMPHOCYTES NFR BLD AUTO: 5.8 %
MCH RBC QN AUTO: 22.7 PG (ref 27–31)
MCHC RBC AUTO-ENTMCNC: 31.4 MG/DL (ref 33–36)
MCV RBC AUTO: 72.3 FL (ref 80–94)
MONOCYTES # BLD AUTO: 0.67 X10(3)/MCL (ref 0.1–1.3)
MONOCYTES NFR BLD AUTO: 5.5 %
NEUTROPHILS # BLD AUTO: 10.6 X10(3)/MCL (ref 2.1–9.2)
NEUTROPHILS NFR BLD AUTO: 87.8 %
NITRITE UR QL STRIP.AUTO: POSITIVE
NRBC BLD AUTO-RTO: 0 %
PH UR STRIP.AUTO: 6 [PH]
PLATELET # BLD AUTO: 259 X10(3)/MCL (ref 130–400)
PMV BLD AUTO: 11.1 FL (ref 7.4–10.4)
POTASSIUM SERPL-SCNC: 4.8 MMOL/L (ref 3.5–5.1)
PROT SERPL-MCNC: 8.6 GM/DL (ref 5.8–7.6)
PROT UR QL STRIP.AUTO: NEGATIVE MG/DL
RBC # BLD AUTO: 5.06 X10(6)/MCL (ref 4.2–5.4)
RBC #/AREA URNS AUTO: <5 /HPF
RBC UR QL AUTO: ABNORMAL UNIT/L
SARS-COV-2 RNA RESP QL NAA+PROBE: NOT DETECTED
SODIUM SERPL-SCNC: 139 MMOL/L (ref 136–145)
SP GR UR STRIP.AUTO: 1.01 (ref 1–1.03)
SQUAMOUS #/AREA URNS AUTO: <5 /HPF
TROPONIN I SERPL-MCNC: 0.01 NG/ML (ref 0–0.04)
UROBILINOGEN UR STRIP-ACNC: 1 MG/DL
WBC # SPEC AUTO: 12.1 X10(3)/MCL (ref 4.5–11.5)
WBC #/AREA URNS AUTO: 44 /HPF

## 2022-11-20 PROCEDURE — 96375 TX/PRO/DX INJ NEW DRUG ADDON: CPT

## 2022-11-20 PROCEDURE — 25500020 PHARM REV CODE 255: Performed by: STUDENT IN AN ORGANIZED HEALTH CARE EDUCATION/TRAINING PROGRAM

## 2022-11-20 PROCEDURE — 96365 THER/PROPH/DIAG IV INF INIT: CPT

## 2022-11-20 PROCEDURE — 63600175 PHARM REV CODE 636 W HCPCS: Performed by: EMERGENCY MEDICINE

## 2022-11-20 PROCEDURE — 80053 COMPREHEN METABOLIC PANEL: CPT | Performed by: EMERGENCY MEDICINE

## 2022-11-20 PROCEDURE — 87077 CULTURE AEROBIC IDENTIFY: CPT | Performed by: EMERGENCY MEDICINE

## 2022-11-20 PROCEDURE — 85025 COMPLETE CBC W/AUTO DIFF WBC: CPT | Performed by: EMERGENCY MEDICINE

## 2022-11-20 PROCEDURE — 25000003 PHARM REV CODE 250: Performed by: EMERGENCY MEDICINE

## 2022-11-20 PROCEDURE — 99285 EMERGENCY DEPT VISIT HI MDM: CPT | Mod: 25

## 2022-11-20 PROCEDURE — 84484 ASSAY OF TROPONIN QUANT: CPT | Performed by: STUDENT IN AN ORGANIZED HEALTH CARE EDUCATION/TRAINING PROGRAM

## 2022-11-20 PROCEDURE — 81001 URINALYSIS AUTO W/SCOPE: CPT | Performed by: EMERGENCY MEDICINE

## 2022-11-20 PROCEDURE — 25000003 PHARM REV CODE 250: Performed by: STUDENT IN AN ORGANIZED HEALTH CARE EDUCATION/TRAINING PROGRAM

## 2022-11-20 PROCEDURE — 87040 BLOOD CULTURE FOR BACTERIA: CPT | Performed by: STUDENT IN AN ORGANIZED HEALTH CARE EDUCATION/TRAINING PROGRAM

## 2022-11-20 PROCEDURE — 83690 ASSAY OF LIPASE: CPT | Performed by: EMERGENCY MEDICINE

## 2022-11-20 PROCEDURE — 0240U COVID/FLU A&B PCR: CPT | Performed by: EMERGENCY MEDICINE

## 2022-11-20 PROCEDURE — 63600175 PHARM REV CODE 636 W HCPCS: Performed by: STUDENT IN AN ORGANIZED HEALTH CARE EDUCATION/TRAINING PROGRAM

## 2022-11-20 PROCEDURE — 80074 ACUTE HEPATITIS PANEL: CPT | Performed by: STUDENT IN AN ORGANIZED HEALTH CARE EDUCATION/TRAINING PROGRAM

## 2022-11-20 PROCEDURE — 96361 HYDRATE IV INFUSION ADD-ON: CPT

## 2022-11-20 RX ORDER — CEPHALEXIN 500 MG/1
500 CAPSULE ORAL EVERY 12 HOURS
Qty: 20 CAPSULE | Refills: 0 | Status: SHIPPED | OUTPATIENT
Start: 2022-11-20 | End: 2022-11-30

## 2022-11-20 RX ORDER — KETOROLAC TROMETHAMINE 30 MG/ML
15 INJECTION, SOLUTION INTRAMUSCULAR; INTRAVENOUS
Status: COMPLETED | OUTPATIENT
Start: 2022-11-20 | End: 2022-11-20

## 2022-11-20 RX ORDER — DICYCLOMINE HYDROCHLORIDE 20 MG/1
20 TABLET ORAL 2 TIMES DAILY PRN
Qty: 20 TABLET | Refills: 0 | Status: SHIPPED | OUTPATIENT
Start: 2022-11-20 | End: 2022-11-30

## 2022-11-20 RX ORDER — ONDANSETRON 4 MG/1
4 TABLET, ORALLY DISINTEGRATING ORAL EVERY 12 HOURS PRN
Qty: 14 TABLET | Refills: 0 | Status: SHIPPED | OUTPATIENT
Start: 2022-11-20 | End: 2022-11-27

## 2022-11-20 RX ORDER — ACETAMINOPHEN 500 MG
1000 TABLET ORAL
Status: COMPLETED | OUTPATIENT
Start: 2022-11-20 | End: 2022-11-20

## 2022-11-20 RX ORDER — ONDANSETRON 4 MG/1
4 TABLET, ORALLY DISINTEGRATING ORAL
Status: COMPLETED | OUTPATIENT
Start: 2022-11-20 | End: 2022-11-20

## 2022-11-20 RX ADMIN — SODIUM CHLORIDE, POTASSIUM CHLORIDE, SODIUM LACTATE AND CALCIUM CHLORIDE 1000 ML: 600; 310; 30; 20 INJECTION, SOLUTION INTRAVENOUS at 01:11

## 2022-11-20 RX ADMIN — ONDANSETRON 4 MG: 4 TABLET, ORALLY DISINTEGRATING ORAL at 09:11

## 2022-11-20 RX ADMIN — KETOROLAC TROMETHAMINE 15 MG: 30 INJECTION, SOLUTION INTRAMUSCULAR at 10:11

## 2022-11-20 RX ADMIN — ACETAMINOPHEN 1000 MG: 500 TABLET, FILM COATED ORAL at 09:11

## 2022-11-20 RX ADMIN — SODIUM CHLORIDE 1000 ML: 9 INJECTION, SOLUTION INTRAVENOUS at 09:11

## 2022-11-20 RX ADMIN — CEFTRIAXONE SODIUM 1 G: 1 INJECTION, POWDER, FOR SOLUTION INTRAMUSCULAR; INTRAVENOUS at 01:11

## 2022-11-20 RX ADMIN — IOPAMIDOL 100 ML: 755 INJECTION, SOLUTION INTRAVENOUS at 12:11

## 2022-11-20 NOTE — DISCHARGE INSTRUCTIONS
Follow up with your primary care physician.      You will need repeat labwork and liver enzymes in 1 week.     Take your antibiotics as prescribed.      Return to the emergency department for any worsening pain, high fever, nausea, vomiting, difficulty breathing, chest pain, headache, or any other symptoms.

## 2022-11-21 LAB
HAV IGM SERPL QL IA: NONREACTIVE
HBV CORE IGM SERPL QL IA: NONREACTIVE
HBV SURFACE AG SERPL QL IA: NONREACTIVE
HCV AB SERPL QL IA: NONREACTIVE

## 2022-11-22 LAB — PATH REV: NORMAL

## 2022-11-23 LAB — BACTERIA UR CULT: ABNORMAL

## 2022-11-25 LAB
BACTERIA BLD CULT: NORMAL
BACTERIA BLD CULT: NORMAL

## 2022-12-08 ENCOUNTER — OFFICE VISIT (OUTPATIENT)
Dept: INTERNAL MEDICINE | Facility: CLINIC | Age: 67
End: 2022-12-08
Payer: MEDICARE

## 2022-12-08 DIAGNOSIS — D72.829 LEUKOCYTOSIS, UNSPECIFIED TYPE: ICD-10-CM

## 2022-12-08 DIAGNOSIS — R94.6 ABNORMAL RESULTS OF THYROID FUNCTION STUDIES: ICD-10-CM

## 2022-12-08 DIAGNOSIS — R79.89 ABNORMAL TSH: ICD-10-CM

## 2022-12-08 PROCEDURE — 1159F PR MEDICATION LIST DOCUMENTED IN MEDICAL RECORD: ICD-10-PCS | Mod: CPTII,95,, | Performed by: NURSE PRACTITIONER

## 2022-12-08 PROCEDURE — 3060F POS MICROALBUMINURIA REV: CPT | Mod: CPTII,95,, | Performed by: NURSE PRACTITIONER

## 2022-12-08 PROCEDURE — 1101F PR PT FALLS ASSESS DOC 0-1 FALLS W/OUT INJ PAST YR: ICD-10-PCS | Mod: CPTII,95,, | Performed by: NURSE PRACTITIONER

## 2022-12-08 PROCEDURE — 1101F PT FALLS ASSESS-DOCD LE1/YR: CPT | Mod: CPTII,95,, | Performed by: NURSE PRACTITIONER

## 2022-12-08 PROCEDURE — 3288F FALL RISK ASSESSMENT DOCD: CPT | Mod: CPTII,95,, | Performed by: NURSE PRACTITIONER

## 2022-12-08 PROCEDURE — 99442 PR PHYSICIAN TELEPHONE EVALUATION 11-20 MIN: ICD-10-PCS | Mod: FQ,95,, | Performed by: NURSE PRACTITIONER

## 2022-12-08 PROCEDURE — 3288F PR FALLS RISK ASSESSMENT DOCUMENTED: ICD-10-PCS | Mod: CPTII,95,, | Performed by: NURSE PRACTITIONER

## 2022-12-08 PROCEDURE — 1160F PR REVIEW ALL MEDS BY PRESCRIBER/CLIN PHARMACIST DOCUMENTED: ICD-10-PCS | Mod: CPTII,95,, | Performed by: NURSE PRACTITIONER

## 2022-12-08 PROCEDURE — 3066F NEPHROPATHY DOC TX: CPT | Mod: CPTII,95,, | Performed by: NURSE PRACTITIONER

## 2022-12-08 PROCEDURE — 3066F PR DOCUMENTATION OF TREATMENT FOR NEPHROPATHY: ICD-10-PCS | Mod: CPTII,95,, | Performed by: NURSE PRACTITIONER

## 2022-12-08 PROCEDURE — 3060F PR POS MICROALBUMINURIA RESULT DOCUMENTED/REVIEW: ICD-10-PCS | Mod: CPTII,95,, | Performed by: NURSE PRACTITIONER

## 2022-12-08 PROCEDURE — 99442 PR PHYSICIAN TELEPHONE EVALUATION 11-20 MIN: CPT | Mod: FQ,95,, | Performed by: NURSE PRACTITIONER

## 2022-12-08 PROCEDURE — 1160F RVW MEDS BY RX/DR IN RCRD: CPT | Mod: CPTII,95,, | Performed by: NURSE PRACTITIONER

## 2022-12-08 PROCEDURE — 1159F MED LIST DOCD IN RCRD: CPT | Mod: CPTII,95,, | Performed by: NURSE PRACTITIONER

## 2022-12-08 NOTE — PROGRESS NOTES
Subjective:       Patient ID: Leatha Martinez is a 67 y.o. female.    Chief Complaint: Follow-up (Completed antibiotics. Denies any UTI symptoms. ) and Results    Established Patient - Audio Only Telehealth Visit     The patient location is: home  The chief complaint leading to consultation is: follow up on ED visit  Visit type: Virtual visit with audio only (telephone)  Total time spent with patient: 13 minutes    The reason for the audio only service rather than synchronous audio and video virtual visit was related to technical difficulties or patient preference/necessity.    Each patient to whom I provide medical services by telemedicine is:  (1) informed of the relationship between the physician and patient and the respective role of any other health care provider with respect to management of the patient; and (2) notified that they may decline to receive medical services by telemedicine and may withdraw from such care at any time. Patient verbally consented to receive this service via voice-only telephone call.     This service was not originating from a related E/M service provided within the previous 7 days nor will  to an E/M service or procedure within the next 24 hours or my soonest available appointment.  Prevailing standard of care was able to be met in this audio-only visit.          Patient has diagnosis of HTN, HLD, Hx of DVT, Hx of COVID-19. Patient seen per audio visit today for ED follow up. Patient seen in ED on 11/20/2022. UA positive, WBC elevated at 12.1, Liver enzymes elevated. CT Abdomen indicated hepatic steatosis. Patient prescribed Keflex and discharged home. Today, patient states she completed her antibiotics and is feeling better. Patient states SOB started after diagnosis of COVID. Currently prescribed Albuterol nebulizer, doing well. Patient seen in clinic on 03/04/2022 for left knee pain. Left knee X-ray indicated degenerative changes. Patient referred to Orthopedic Clinic, had  appointment on 03/15/2022 but was a no show. Patient denies any other acute complaints.      Patient followed by Cardiology Clinic. Last appointment on 01/20/2022. Hx of HTN, Pulmonary HTN, unprovoked DVT (2014) on Xarelto, and obesity. Patient presented for cardiac risk ratification for a upcoming colonoscopy after having a positive fit test. Denied symptoms of chest pain, shortness of breath, dyspnea on exertion, lower extremity edema, or palpitations. Echocardiogram TTE in August 2021 showed mild concentric LVH and an EF of 50-55%. Lexiscan performed in October 2021 showed no ischemia no scar. Patient reported compliance with all medications. Patient is low to moderate risk for colonoscopy for cardiac risk stratification. Patient has follow up appointment scheduled for 01/19/2023.        Mammogram: 04/26/2022, negative  PAP: deferred at this time  FIT: 09/30/2021, positive  Colonoscopy: 04/14/2022, recommend repeat in 3 years  Bone Density: 08/14/2020, osteopenia  Medicare Wellness: N/A        Review of Systems   Constitutional: Negative.    HENT: Negative.     Eyes: Negative.    Respiratory: Negative.     Cardiovascular: Negative.    Gastrointestinal: Negative.    Endocrine: Negative.    Genitourinary: Negative.    Musculoskeletal: Negative.    Integumentary:  Negative.   Allergic/Immunologic: Negative.    Neurological: Negative.    Hematological: Negative.    Psychiatric/Behavioral: Negative.         Objective:      Physical Exam  Neurological:      Mental Status: She is alert and oriented to person, place, and time.   Psychiatric:         Mood and Affect: Mood normal.       Assessment:       Problem List Items Addressed This Visit          Oncology    Leukocytosis     WBC on 11/20/2022: 12.1  Repeat ordered  Patient completed Keflex for UTI prescribed on 11/20/2022         Relevant Orders    CBC Auto Differential (Completed)    Comprehensive Metabolic Panel (Completed)    Urinalysis, Reflex to Urine Culture  Urine, Clean Catch (Completed)       Endocrine    Abnormal TSH     TSH level on 10/04/2022: 0.2  Repeat TSH with Free T4 and T3 ordered         Relevant Orders    TSH (Completed)    T3, Free ([facility]) (Completed)     Other Visit Diagnoses       Abnormal results of thyroid function studies        Relevant Orders    TSH (Completed)            Plan:    Patient has follow up appointment scheduled for 04/04/2023.

## 2022-12-09 ENCOUNTER — LAB VISIT (OUTPATIENT)
Dept: LAB | Facility: HOSPITAL | Age: 67
End: 2022-12-09
Attending: NURSE PRACTITIONER
Payer: MEDICARE

## 2022-12-09 DIAGNOSIS — D72.829 LEUKOCYTOSIS, UNSPECIFIED TYPE: ICD-10-CM

## 2022-12-09 DIAGNOSIS — R94.6 ABNORMAL RESULTS OF THYROID FUNCTION STUDIES: ICD-10-CM

## 2022-12-09 DIAGNOSIS — R79.89 ABNORMAL TSH: ICD-10-CM

## 2022-12-09 LAB
ALBUMIN SERPL-MCNC: 3.2 GM/DL (ref 3.4–4.8)
ALBUMIN/GLOB SERPL: 0.7 RATIO (ref 1.1–2)
ALP SERPL-CCNC: 155 UNIT/L (ref 40–150)
ALT SERPL-CCNC: 18 UNIT/L (ref 0–55)
APPEARANCE UR: CLEAR
AST SERPL-CCNC: 19 UNIT/L (ref 5–34)
BACTERIA #/AREA URNS AUTO: ABNORMAL /HPF
BASOPHILS # BLD AUTO: 0.02 X10(3)/MCL (ref 0–0.2)
BASOPHILS NFR BLD AUTO: 0.3 %
BILIRUB UR QL STRIP.AUTO: NEGATIVE MG/DL
BILIRUBIN DIRECT+TOT PNL SERPL-MCNC: 0.3 MG/DL
BUN SERPL-MCNC: 14.7 MG/DL (ref 9.8–20.1)
CALCIUM SERPL-MCNC: 9 MG/DL (ref 8.4–10.2)
CHLORIDE SERPL-SCNC: 110 MMOL/L (ref 98–107)
CO2 SERPL-SCNC: 22 MMOL/L (ref 23–31)
COLOR UR AUTO: ABNORMAL
CREAT SERPL-MCNC: 0.85 MG/DL (ref 0.55–1.02)
EOSINOPHIL # BLD AUTO: 0.14 X10(3)/MCL (ref 0–0.9)
EOSINOPHIL NFR BLD AUTO: 1.9 %
ERYTHROCYTE [DISTWIDTH] IN BLOOD BY AUTOMATED COUNT: 15.9 % (ref 11.5–17)
GFR SERPLBLD CREATININE-BSD FMLA CKD-EPI: >60 MLS/MIN/1.73/M2
GLOBULIN SER-MCNC: 4.8 GM/DL (ref 2.4–3.5)
GLUCOSE SERPL-MCNC: 83 MG/DL (ref 82–115)
GLUCOSE UR QL STRIP.AUTO: NORMAL MG/DL
HCT VFR BLD AUTO: 36.5 % (ref 37–47)
HGB BLD-MCNC: 11 GM/DL (ref 12–16)
HYALINE CASTS #/AREA URNS LPF: ABNORMAL /LPF
IMM GRANULOCYTES # BLD AUTO: 0.02 X10(3)/MCL (ref 0–0.04)
IMM GRANULOCYTES NFR BLD AUTO: 0.3 %
KETONES UR QL STRIP.AUTO: NEGATIVE MG/DL
LEUKOCYTE ESTERASE UR QL STRIP.AUTO: 500 UNIT/L
LYMPHOCYTES # BLD AUTO: 2.17 X10(3)/MCL (ref 0.6–4.6)
LYMPHOCYTES NFR BLD AUTO: 28.7 %
MCH RBC QN AUTO: 22.2 PG (ref 27–31)
MCHC RBC AUTO-ENTMCNC: 30.1 MG/DL (ref 33–36)
MCV RBC AUTO: 73.6 FL (ref 80–94)
MONOCYTES # BLD AUTO: 0.55 X10(3)/MCL (ref 0.1–1.3)
MONOCYTES NFR BLD AUTO: 7.3 %
MUCOUS THREADS URNS QL MICRO: ABNORMAL /LPF
NEUTROPHILS # BLD AUTO: 4.7 X10(3)/MCL (ref 2.1–9.2)
NEUTROPHILS NFR BLD AUTO: 61.5 %
NITRITE UR QL STRIP.AUTO: NEGATIVE
NRBC BLD AUTO-RTO: 0 %
PH UR STRIP.AUTO: 5.5 [PH]
PLATELET # BLD AUTO: 260 X10(3)/MCL (ref 130–400)
PMV BLD AUTO: 10.2 FL (ref 7.4–10.4)
POTASSIUM SERPL-SCNC: 4.2 MMOL/L (ref 3.5–5.1)
PROT SERPL-MCNC: 8 GM/DL (ref 5.8–7.6)
PROT UR QL STRIP.AUTO: NEGATIVE MG/DL
RBC # BLD AUTO: 4.96 X10(6)/MCL (ref 4.2–5.4)
RBC #/AREA URNS AUTO: ABNORMAL /HPF
RBC UR QL AUTO: ABNORMAL UNIT/L
SODIUM SERPL-SCNC: 143 MMOL/L (ref 136–145)
SP GR UR STRIP.AUTO: 1.01
SQUAMOUS #/AREA URNS LPF: ABNORMAL /HPF
T3FREE SERPL-MCNC: 3.28 PG/ML (ref 1.57–3.91)
T4 FREE SERPL-MCNC: 0.9 NG/DL (ref 0.7–1.48)
TSH SERPL-ACNC: 0.16 UIU/ML (ref 0.35–4.94)
UROBILINOGEN UR STRIP-ACNC: NORMAL MG/DL
WBC # SPEC AUTO: 7.6 X10(3)/MCL (ref 4.5–11.5)
WBC #/AREA URNS AUTO: ABNORMAL /HPF

## 2022-12-09 PROCEDURE — 84439 ASSAY OF FREE THYROXINE: CPT

## 2022-12-09 PROCEDURE — 87077 CULTURE AEROBIC IDENTIFY: CPT

## 2022-12-09 PROCEDURE — 81001 URINALYSIS AUTO W/SCOPE: CPT

## 2022-12-09 PROCEDURE — 85025 COMPLETE CBC W/AUTO DIFF WBC: CPT

## 2022-12-09 PROCEDURE — 84443 ASSAY THYROID STIM HORMONE: CPT

## 2022-12-09 PROCEDURE — 36415 COLL VENOUS BLD VENIPUNCTURE: CPT

## 2022-12-09 PROCEDURE — 80053 COMPREHEN METABOLIC PANEL: CPT

## 2022-12-09 PROCEDURE — 84481 FREE ASSAY (FT-3): CPT

## 2022-12-09 NOTE — ASSESSMENT & PLAN NOTE
WBC on 11/20/2022: 12.1  Repeat ordered  Patient completed Keflex for UTI prescribed on 11/20/2022

## 2022-12-11 LAB — BACTERIA UR CULT: ABNORMAL

## 2022-12-14 ENCOUNTER — PATIENT MESSAGE (OUTPATIENT)
Dept: INTERNAL MEDICINE | Facility: CLINIC | Age: 67
End: 2022-12-14
Payer: MEDICARE

## 2022-12-14 DIAGNOSIS — R79.89 ABNORMAL TSH: Primary | ICD-10-CM

## 2022-12-14 DIAGNOSIS — N39.0 FREQUENT UTI: ICD-10-CM

## 2023-01-04 ENCOUNTER — OFFICE VISIT (OUTPATIENT)
Dept: UROLOGY | Facility: CLINIC | Age: 68
End: 2023-01-04
Payer: MEDICARE

## 2023-01-04 VITALS
HEIGHT: 66 IN | OXYGEN SATURATION: 99 % | HEART RATE: 94 BPM | RESPIRATION RATE: 18 BRPM | SYSTOLIC BLOOD PRESSURE: 122 MMHG | DIASTOLIC BLOOD PRESSURE: 71 MMHG | TEMPERATURE: 98 F | BODY MASS INDEX: 47.09 KG/M2 | WEIGHT: 293 LBS

## 2023-01-04 DIAGNOSIS — N39.0 RECURRENT URINARY TRACT INFECTION: ICD-10-CM

## 2023-01-04 PROCEDURE — 1159F MED LIST DOCD IN RCRD: CPT | Mod: CPTII,,, | Performed by: UROLOGY

## 2023-01-04 PROCEDURE — 99204 OFFICE O/P NEW MOD 45 MIN: CPT | Mod: S$PBB,,, | Performed by: UROLOGY

## 2023-01-04 PROCEDURE — 1101F PT FALLS ASSESS-DOCD LE1/YR: CPT | Mod: CPTII,,, | Performed by: UROLOGY

## 2023-01-04 PROCEDURE — 1160F RVW MEDS BY RX/DR IN RCRD: CPT | Mod: CPTII,,, | Performed by: UROLOGY

## 2023-01-04 PROCEDURE — 1125F PR PAIN SEVERITY QUANTIFIED, PAIN PRESENT: ICD-10-PCS | Mod: CPTII,,, | Performed by: UROLOGY

## 2023-01-04 PROCEDURE — 3288F FALL RISK ASSESSMENT DOCD: CPT | Mod: CPTII,,, | Performed by: UROLOGY

## 2023-01-04 PROCEDURE — 3078F PR MOST RECENT DIASTOLIC BLOOD PRESSURE < 80 MM HG: ICD-10-PCS | Mod: CPTII,,, | Performed by: UROLOGY

## 2023-01-04 PROCEDURE — 99215 OFFICE O/P EST HI 40 MIN: CPT | Mod: PBBFAC | Performed by: UROLOGY

## 2023-01-04 PROCEDURE — 3074F SYST BP LT 130 MM HG: CPT | Mod: CPTII,,, | Performed by: UROLOGY

## 2023-01-04 PROCEDURE — 3074F PR MOST RECENT SYSTOLIC BLOOD PRESSURE < 130 MM HG: ICD-10-PCS | Mod: CPTII,,, | Performed by: UROLOGY

## 2023-01-04 PROCEDURE — 3008F PR BODY MASS INDEX (BMI) DOCUMENTED: ICD-10-PCS | Mod: CPTII,,, | Performed by: UROLOGY

## 2023-01-04 PROCEDURE — 3008F BODY MASS INDEX DOCD: CPT | Mod: CPTII,,, | Performed by: UROLOGY

## 2023-01-04 PROCEDURE — 1160F PR REVIEW ALL MEDS BY PRESCRIBER/CLIN PHARMACIST DOCUMENTED: ICD-10-PCS | Mod: CPTII,,, | Performed by: UROLOGY

## 2023-01-04 PROCEDURE — 1101F PR PT FALLS ASSESS DOC 0-1 FALLS W/OUT INJ PAST YR: ICD-10-PCS | Mod: CPTII,,, | Performed by: UROLOGY

## 2023-01-04 PROCEDURE — 99204 PR OFFICE/OUTPT VISIT, NEW, LEVL IV, 45-59 MIN: ICD-10-PCS | Mod: S$PBB,,, | Performed by: UROLOGY

## 2023-01-04 PROCEDURE — 3078F DIAST BP <80 MM HG: CPT | Mod: CPTII,,, | Performed by: UROLOGY

## 2023-01-04 PROCEDURE — 1125F AMNT PAIN NOTED PAIN PRSNT: CPT | Mod: CPTII,,, | Performed by: UROLOGY

## 2023-01-04 PROCEDURE — 3288F PR FALLS RISK ASSESSMENT DOCUMENTED: ICD-10-PCS | Mod: CPTII,,, | Performed by: UROLOGY

## 2023-01-04 PROCEDURE — 1159F PR MEDICATION LIST DOCUMENTED IN MEDICAL RECORD: ICD-10-PCS | Mod: CPTII,,, | Performed by: UROLOGY

## 2023-01-04 RX ORDER — CEPHALEXIN 250 MG/1
250 CAPSULE ORAL DAILY
Qty: 90 CAPSULE | Refills: 1 | Status: SHIPPED | OUTPATIENT
Start: 2023-01-04 | End: 2023-02-01

## 2023-01-04 NOTE — PROGRESS NOTES
CC:  Recurrent urinary tract infection    HPI:  Leatha Martinez is a 67 y.o. female seen in consultation for recurrent urinary tract infections.  She had a urinary tract infection in October, November, and December of 2022.  These infections were all asymptomatic.  Two of the urine cultures grew out greater than 100,000 E coli and the third was <10,000 E coli.  She had a CT scan on 20 November 2022 which was negative.  She denies any urinary symptoms either with or between the infections.  She denies incontinence.  She does state that she tends to hold her urine.  She has no past history of recurrent urinary tract infections or other urologic history.    Lab Results:    Recent Labs     12/09/22  0837   CREATININE 0.85       Imaging:  CT - 20 November 2022:  Negative     Data Review:  Note from referring provider, RENU Kellogg dated 9 December 2022.  CT.  Urine culture.     ROS:  All systems reviewed and are negative except as documented in HPI and/or Assessment and Plan.     Patient Active Problem List:     Patient Active Problem List   Diagnosis    Hypertension    Gastro-esophageal reflux disease without esophagitis    Other hyperlipidemia    Class 3 severe obesity due to excess calories with serious comorbidity and body mass index (BMI) of 45.0 to 49.9 in adult    Personal history of other venous thrombosis and embolism    Personal history of COVID-19    SOB (shortness of breath)    Abnormal TSH    Leukocytosis        Past Medical History:  Past Medical History:   Diagnosis Date    Hyperlipidemia     Hypertension     Personal history of colonic polyps 04/14/2022        Past Surgical History:  Past Surgical History:   Procedure Laterality Date    COLONOSCOPY  04/14/2022        Family History:  Family History   Problem Relation Age of Onset    Heart attack Mother     Thyroid disease Mother     Diabetes Father     Thyroid disease Father     Heart attack Father         Social History:  Social History      Socioeconomic History    Marital status: Single    Number of children: 3   Tobacco Use    Smoking status: Never     Passive exposure: Current    Smokeless tobacco: Never   Substance and Sexual Activity    Alcohol use: Not Currently    Drug use: Never    Sexual activity: Not Currently     Partners: Male        Allergies:  Review of patient's allergies indicates:   Allergen Reactions    Penicillins Hives        Objective:  Vitals:    01/04/23 0815   BP: 122/71   Pulse: 94   Resp: 18   Temp: 97.7 °F (36.5 °C)     General:  Well developed, well nourished adult female in no acute distress  Abdomen: Soft, nontender, no masses  Extremities:  No clubbing, cyanosis, or edema  Neurologic:  Grossly intact  Musculoskeletal:  Normal tone    Assessment:  1. Recurrent urinary tract infection  - Ambulatory referral/consult to Urology  - cephALEXin (KEFLEX) 250 MG capsule; Take 1 capsule (250 mg total) by mouth once daily.  Dispense: 90 capsule; Refill: 1     Plan:  With three infections back to back and then being asymptomatic I will place her on suppression with cephalexin 250 mg a day.  Cystoscopy.    Follow-up:    For cystoscopy

## 2023-01-04 NOTE — PROGRESS NOTES
Patient seen by Dr. Greene and Martir appointment will be made at discharge. Patient states understanding all discussed at visit with no further questions. Patient will receive next appointment information at discharge.

## 2023-01-05 ENCOUNTER — TELEPHONE (OUTPATIENT)
Dept: INTERNAL MEDICINE | Facility: CLINIC | Age: 68
End: 2023-01-05
Payer: MEDICARE

## 2023-01-05 NOTE — TELEPHONE ENCOUNTER
Pt daughter called and requested a call back from the nurse or NP in regards to what will be the next step beings pt seen urologist yesterday. Pt daughter also stated pt was prescribed antibiotics for 90 days and is wanting to know if pt has to take them for 90 days.

## 2023-01-06 NOTE — TELEPHONE ENCOUNTER
Called the daughter back this morning to inform her to follow the urologist's suggestions. She stated she understands that but still wanted to speak to you about testing in between taking the antibiotics. Is requesting a virtual appt with you. Please advise.

## 2023-01-19 ENCOUNTER — OFFICE VISIT (OUTPATIENT)
Dept: CARDIOLOGY | Facility: CLINIC | Age: 68
End: 2023-01-19
Payer: MEDICARE

## 2023-01-19 VITALS
HEART RATE: 86 BPM | BODY MASS INDEX: 46.39 KG/M2 | RESPIRATION RATE: 20 BRPM | TEMPERATURE: 98 F | SYSTOLIC BLOOD PRESSURE: 145 MMHG | OXYGEN SATURATION: 98 % | DIASTOLIC BLOOD PRESSURE: 69 MMHG | WEIGHT: 288.63 LBS | HEIGHT: 66 IN

## 2023-01-19 DIAGNOSIS — I10 PRIMARY HYPERTENSION: Primary | Chronic | ICD-10-CM

## 2023-01-19 DIAGNOSIS — I26.93 SINGLE SUBSEGMENTAL PULMONARY EMBOLISM WITHOUT ACUTE COR PULMONALE: ICD-10-CM

## 2023-01-19 DIAGNOSIS — R06.09 POST-COVID CHRONIC DYSPNEA: ICD-10-CM

## 2023-01-19 DIAGNOSIS — U09.9 POST-COVID CHRONIC DYSPNEA: ICD-10-CM

## 2023-01-19 DIAGNOSIS — E78.49 OTHER HYPERLIPIDEMIA: Chronic | ICD-10-CM

## 2023-01-19 DIAGNOSIS — E66.01 CLASS 3 SEVERE OBESITY DUE TO EXCESS CALORIES WITH SERIOUS COMORBIDITY AND BODY MASS INDEX (BMI) OF 45.0 TO 49.9 IN ADULT: Chronic | ICD-10-CM

## 2023-01-19 PROBLEM — R06.02 SOB (SHORTNESS OF BREATH): Status: RESOLVED | Noted: 2022-10-04 | Resolved: 2023-01-19

## 2023-01-19 PROCEDURE — 99214 OFFICE O/P EST MOD 30 MIN: CPT | Mod: PBBFAC | Performed by: INTERNAL MEDICINE

## 2023-01-19 PROCEDURE — 93005 ELECTROCARDIOGRAM TRACING: CPT

## 2023-01-19 RX ORDER — SIMVASTATIN 20 MG/1
20 TABLET, FILM COATED ORAL NIGHTLY
Qty: 90 TABLET | Refills: 2 | Status: SHIPPED | OUTPATIENT
Start: 2023-01-19 | End: 2023-09-26

## 2023-01-19 RX ORDER — FUROSEMIDE 20 MG/1
20 TABLET ORAL DAILY
Qty: 90 TABLET | Refills: 2 | Status: SHIPPED | OUTPATIENT
Start: 2023-01-19 | End: 2023-09-26

## 2023-01-19 RX ORDER — AMLODIPINE BESYLATE 10 MG/1
10 TABLET ORAL DAILY
Qty: 90 TABLET | Refills: 2 | Status: SHIPPED | OUTPATIENT
Start: 2023-01-19 | End: 2023-09-26

## 2023-01-19 RX ORDER — ERGOCALCIFEROL 1.25 MG/1
50000 CAPSULE ORAL
COMMUNITY
Start: 2023-01-05 | End: 2023-04-04 | Stop reason: SDUPTHER

## 2023-01-19 NOTE — PROGRESS NOTES
[facility]  Cardiology Outpatient Clinic  Follow-up Visit     Date of Visit: 1/19/2023    History: Unprovoked subsegmental PE (2013), venous insufficiency    History of Present Illness:      No major changes. Remains chronically SOB after COVID. Denies chest pain. No leg swelling today. Has some right knee pain. Ha sbeen taking her meds. Says her BP is high today due to walking upstairs and being SOB. It is usually normal. Complaint with all meds. Denies any evidence of bleeding.     Medications:     Current Outpatient Medications   Medication Sig Dispense Refill    albuterol (VENTOLIN HFA) 90 mcg/actuation inhaler Inhale 2 puffs into the lungs every 6 (six) hours as needed for Wheezing. Rescue 18 g 1    amLODIPine (NORVASC) 10 MG tablet Take 1 tablet (10 mg total) by mouth once daily. 90 tablet 2    cephALEXin (KEFLEX) 250 MG capsule Take 1 capsule (250 mg total) by mouth once daily. 90 capsule 1    ergocalciferol (ERGOCALCIFEROL) 50,000 unit Cap Take 50,000 Units by mouth every 7 days.      furosemide (LASIX) 20 MG tablet Take 1 tablet (20 mg total) by mouth once daily. 90 tablet 2    pantoprazole (PROTONIX) 40 MG tablet Take 1 tablet (40 mg total) by mouth once daily. 90 tablet 2    rivaroxaban (XARELTO) 20 mg Tab Take 20 mg by mouth daily with dinner or evening meal.      simvastatin (ZOCOR) 20 MG tablet Take 1 tablet (20 mg total) by mouth every evening. 90 tablet 2     No current facility-administered medications for this visit.     I have reviewed and updated the patient's medications, allergies, past medical history, surgical history, social history and family history as needed.    Review of Systems:   Up to 10 point review of systems is negative unless noted in HPI or overall note.     Objective:     Wt Readings from Last 3 Encounters:   01/19/23 130.9 kg (288 lb 9.6 oz)   01/04/23 132.9 kg (293 lb)   11/20/22 132.9 kg (293 lb)     Temp Readings from Last 3 Encounters:   01/19/23 98.1 °F (36.7  "°C) (Oral)   01/04/23 97.7 °F (36.5 °C) (Oral)   11/20/22 99.1 °F (37.3 °C)     BP Readings from Last 3 Encounters:   01/19/23 (!) 145/69   01/04/23 122/71   11/20/22 (!) 150/80     Pulse Readings from Last 3 Encounters:   01/19/23 86   01/04/23 94   11/20/22 86       BP (!) 145/69 (BP Location: Left arm, Patient Position: Sitting, BP Method: Medium (Automatic))   Pulse 86   Temp 98.1 °F (36.7 °C) (Oral)   Resp 20   Ht 5' 6.14" (1.68 m)   Wt 130.9 kg (288 lb 9.6 oz)   SpO2 98%   BMI 46.38 kg/m²   General: Alert and oriented. No acute distress. Speaking in full sentences.   HEENT: Normocephalic. Atraumatic  Neck: Supple. No JVD  Heart: RRR. S1 and S2 heard. No murmurs. No pitting edema BLE.  Lungs: CTABL. Nonlabored respirations. No supplemental O2  GI: No tenderness or distention  : No squires  Skin: No venous stasis changes in BLE.   MSK: No deformities. Walker  Neuro: Alert    Labs:   Most recently available and pertinent labs including CBC, CMP, A1C, thyroid labs, coagulation studies, and cardiac labs have been reviewed.       Cardiac Studies/Imaging:   Pertinent cardiovascular studies including chest imaging, CT scans, MRI, echocardiogram, cardiac cath, peripheral vascular imaging, electrical monitoring were independently reviewed.     Assessment/Plan:   Leatha Martinez is a 67 y.o. female with a PMH unprovoked subsegmental PE (2013), severe COVID, HTN, HLD, venous insufficiency, obesity here as a followup. From a cardiac perspective she is doing well. She tells me when she had her PE she had just been on a flight from [place]. She also states she has a family history of blood clots and lupus. She apparently had testing done for hypercoagulability previously.     - Continue xarelto 20mg daily for prior PE. Would evaluate causes of microcytic anemia.   - Continue amlodipine 10mg daily ans lasix 20mg daily  - Continue simvastatin 20mg daily  - 1 year followup    Brandon Garland MD   [facility] Cardiology " Fellow  [facility] Cardiology     Future Appointments   Date Time Provider Department Center   2/1/2023 12:00 PM RENU Tubbs   4/4/2023  7:30 AM RENU Tubbs   5/18/2023  7:15 AM DO DAYANNA Hernadez Un

## 2023-02-01 ENCOUNTER — OFFICE VISIT (OUTPATIENT)
Dept: INTERNAL MEDICINE | Facility: CLINIC | Age: 68
End: 2023-02-01
Payer: MEDICARE

## 2023-02-01 VITALS
SYSTOLIC BLOOD PRESSURE: 148 MMHG | OXYGEN SATURATION: 99 % | TEMPERATURE: 98 F | DIASTOLIC BLOOD PRESSURE: 82 MMHG | BODY MASS INDEX: 45.48 KG/M2 | RESPIRATION RATE: 20 BRPM | WEIGHT: 283 LBS | HEART RATE: 87 BPM | HEIGHT: 66 IN

## 2023-02-01 DIAGNOSIS — I10 PRIMARY HYPERTENSION: Chronic | ICD-10-CM

## 2023-02-01 DIAGNOSIS — E78.49 OTHER HYPERLIPIDEMIA: Chronic | ICD-10-CM

## 2023-02-01 DIAGNOSIS — E66.01 CLASS 3 SEVERE OBESITY DUE TO EXCESS CALORIES WITH SERIOUS COMORBIDITY AND BODY MASS INDEX (BMI) OF 45.0 TO 49.9 IN ADULT: Chronic | ICD-10-CM

## 2023-02-01 DIAGNOSIS — Z86.718 PERSONAL HISTORY OF OTHER VENOUS THROMBOSIS AND EMBOLISM: Chronic | ICD-10-CM

## 2023-02-01 DIAGNOSIS — K21.9 GASTRO-ESOPHAGEAL REFLUX DISEASE WITHOUT ESOPHAGITIS: Chronic | ICD-10-CM

## 2023-02-01 DIAGNOSIS — T78.40XA ALLERGIC REACTION, INITIAL ENCOUNTER: ICD-10-CM

## 2023-02-01 DIAGNOSIS — G89.29 OTHER CHRONIC PAIN: Primary | ICD-10-CM

## 2023-02-01 DIAGNOSIS — R79.89 ABNORMAL TSH: ICD-10-CM

## 2023-02-01 PROCEDURE — 1125F PR PAIN SEVERITY QUANTIFIED, PAIN PRESENT: ICD-10-PCS | Mod: CPTII,,, | Performed by: NURSE PRACTITIONER

## 2023-02-01 PROCEDURE — 99214 PR OFFICE/OUTPT VISIT, EST, LEVL IV, 30-39 MIN: ICD-10-PCS | Mod: S$PBB,,, | Performed by: NURSE PRACTITIONER

## 2023-02-01 PROCEDURE — 3288F FALL RISK ASSESSMENT DOCD: CPT | Mod: CPTII,,, | Performed by: NURSE PRACTITIONER

## 2023-02-01 PROCEDURE — 3079F PR MOST RECENT DIASTOLIC BLOOD PRESSURE 80-89 MM HG: ICD-10-PCS | Mod: CPTII,,, | Performed by: NURSE PRACTITIONER

## 2023-02-01 PROCEDURE — 1159F MED LIST DOCD IN RCRD: CPT | Mod: CPTII,,, | Performed by: NURSE PRACTITIONER

## 2023-02-01 PROCEDURE — 3008F PR BODY MASS INDEX (BMI) DOCUMENTED: ICD-10-PCS | Mod: CPTII,,, | Performed by: NURSE PRACTITIONER

## 2023-02-01 PROCEDURE — 99215 OFFICE O/P EST HI 40 MIN: CPT | Mod: PBBFAC | Performed by: NURSE PRACTITIONER

## 2023-02-01 PROCEDURE — 1160F PR REVIEW ALL MEDS BY PRESCRIBER/CLIN PHARMACIST DOCUMENTED: ICD-10-PCS | Mod: CPTII,,, | Performed by: NURSE PRACTITIONER

## 2023-02-01 PROCEDURE — 3077F SYST BP >= 140 MM HG: CPT | Mod: CPTII,,, | Performed by: NURSE PRACTITIONER

## 2023-02-01 PROCEDURE — 1101F PR PT FALLS ASSESS DOC 0-1 FALLS W/OUT INJ PAST YR: ICD-10-PCS | Mod: CPTII,,, | Performed by: NURSE PRACTITIONER

## 2023-02-01 PROCEDURE — 1125F AMNT PAIN NOTED PAIN PRSNT: CPT | Mod: CPTII,,, | Performed by: NURSE PRACTITIONER

## 2023-02-01 PROCEDURE — 99214 OFFICE O/P EST MOD 30 MIN: CPT | Mod: S$PBB,,, | Performed by: NURSE PRACTITIONER

## 2023-02-01 PROCEDURE — 3008F BODY MASS INDEX DOCD: CPT | Mod: CPTII,,, | Performed by: NURSE PRACTITIONER

## 2023-02-01 PROCEDURE — 3288F PR FALLS RISK ASSESSMENT DOCUMENTED: ICD-10-PCS | Mod: CPTII,,, | Performed by: NURSE PRACTITIONER

## 2023-02-01 PROCEDURE — 3077F PR MOST RECENT SYSTOLIC BLOOD PRESSURE >= 140 MM HG: ICD-10-PCS | Mod: CPTII,,, | Performed by: NURSE PRACTITIONER

## 2023-02-01 PROCEDURE — 3079F DIAST BP 80-89 MM HG: CPT | Mod: CPTII,,, | Performed by: NURSE PRACTITIONER

## 2023-02-01 PROCEDURE — 1101F PT FALLS ASSESS-DOCD LE1/YR: CPT | Mod: CPTII,,, | Performed by: NURSE PRACTITIONER

## 2023-02-01 PROCEDURE — 1159F PR MEDICATION LIST DOCUMENTED IN MEDICAL RECORD: ICD-10-PCS | Mod: CPTII,,, | Performed by: NURSE PRACTITIONER

## 2023-02-01 PROCEDURE — 1160F RVW MEDS BY RX/DR IN RCRD: CPT | Mod: CPTII,,, | Performed by: NURSE PRACTITIONER

## 2023-02-01 RX ORDER — HYDROCORTISONE 25 MG/G
CREAM TOPICAL 2 TIMES DAILY
Qty: 20 G | Refills: 1 | Status: SHIPPED | OUTPATIENT
Start: 2023-02-01 | End: 2023-04-04

## 2023-02-01 NOTE — PROGRESS NOTES
Patient ID: [id number]     Chief Complaint: Follow-up (Here to discuss taking antibiotic)    HPI:     Leatha Martinez is a 67 y.o. female with diagnosis of HTN, HLD, Hx of DVT, Hx of COVID-19, Obesity, Frequent UTIs. Patient seen in clinic today for reaction to antibiotic. Patient last seen in clinic on 12/08/2022. Patient seen by Urology (see below) on 01/04/2023, prescribed Keflex for recurrent UTIs. Patient states she started with a rash about a week after. Patient states she stopped the antibiotic and the rash started to improve. Patient states rash still present on hand, arms, chest. Patient does have an allergy to PCN. Patient treated with keflex previously without complications. Patient also states chronic generalized pain.   Patient states SOB started after diagnosis of COVID. Currently prescribed Albuterol nebulizer, doing well.   Patient seen in clinic on 03/04/2022 for left knee pain. Left knee X-ray indicated degenerative changes. Patient referred to Orthopedic Clinic, had appointment on 03/15/2022 but was a no show. Patient states chronic pain to back, hips, knees. States was prescribed Norco previously and it helped. Patient denies any other acute complaints.      Patient followed by Cardiology Clinic. Last appointment on 01/19/2023. Hx of HTN, Pulmonary HTN, unprovoked DVT (2014) on Xarelto, and obesity. No major changes. Remains chronically SOB after COVID. Denies chest pain. No leg swelling today. Has some right knee pain. Ha sbeen taking her meds. Says her BP is high today due to walking upstairs and being SOB. It is usually normal. Complaint with all meds. Denies any evidence of bleeding. Leatha Martinez is a 67 y.o. female with a PMH unprovoked subsegmental PE (2013), severe COVID, HTN, HLD, venous insufficiency, obesity here as a followup. From a cardiac perspective she is doing well. She tells me when she had her PE she had just been on a flight from [place]. She also states she has a family history  of blood clots and lupus. She apparently had testing done for hypercoagulability previously. Continue xarelto 20mg daily for prior PE. Would evaluate causes of microcytic anemia. Continue amlodipine 10mg daily and lasix 20mg daily. Continue simvastatin 20mg daily. 1 year followup.     Patient followed by Urology Clinic. Last appointment on 2023. Leatha Martinez is a 67 y.o. female seen in consultation for recurrent urinary tract infections. She had a urinary tract infection in October, November, and 2022.  These infections were all asymptomatic.  Two of the urine cultures grew out greater than 100,000 E coli and the third was <10,000 E coli.  She had a CT scan on 2022 which was negative.  She denies any urinary symptoms either with or between the infections.  She denies incontinence.  She does state that she tends to hold her urine.  She has no past history of recurrent urinary tract infections or other urologic history. With three infections back to back and then being asymptomatic I will place her on suppression with cephalexin 250 mg a day. Cystoscopy. Patient has follow up appointment scheduled for 2023.       Review of patient's allergies indicates:   Allergen Reactions    Penicillins Hives    Aspirin Rash    Keflex [cephalexin] Rash    Tramadol Rash       Breast Cancer Screenin2022, negative  Cervical Cancer Screening: deferred due to age  Colorectal Cancer Screening:  Colonoscopy on 2022, recommend repeat in 3 years  Diabetic Eye Exam: N/A  Diabetic Foot Exam: N/A  Lung Cancer Screening: N/A  Prostate Cancer Screening: N/A  AAA Screening: deferred due to age  Osteoporosis Screenin2020, osteopenia  Medicare Wellness: ordered  Immunizations:   Immunization History   Administered Date(s) Administered    Influenza (FLUAD) - Quadrivalent - Adjuvanted - PF *Preferred* (65+) 10/04/2022    Tdap 2017       Past Surgical History:   Procedure Laterality  "Date    COLONOSCOPY  04/14/2022       family history includes Diabetes in her father; Heart attack in her father and mother; Thyroid disease in her father and mother.    Social History     Socioeconomic History    Marital status: Single    Number of children: 3   Tobacco Use    Smoking status: Never     Passive exposure: Current    Smokeless tobacco: Never   Substance and Sexual Activity    Alcohol use: Not Currently    Drug use: Never    Sexual activity: Not Currently     Partners: Male       Current Outpatient Medications   Medication Instructions    albuterol (VENTOLIN HFA) 90 mcg/actuation inhaler 2 puffs, Inhalation, Every 6 hours PRN, Rescue    amLODIPine (NORVASC) 10 mg, Oral, Daily    ergocalciferol (ERGOCALCIFEROL) 50,000 Units, Oral, Every 7 days    furosemide (LASIX) 20 mg, Oral, Daily    hydrocortisone 2.5 % cream Topical (Top), 2 times daily    pantoprazole (PROTONIX) 40 mg, Oral, Daily    rivaroxaban (XARELTO) 20 mg, Oral, With dinner    simvastatin (ZOCOR) 20 mg, Oral, Nightly       Subjective:     Review of Systems   Constitutional: Negative.    HENT: Negative.     Eyes: Negative.    Respiratory: Negative.     Cardiovascular: Negative.    Gastrointestinal: Negative.    Endocrine: Negative.    Genitourinary: Negative.    Musculoskeletal: Negative.    Skin:  Positive for rash.   Allergic/Immunologic: Negative.    Neurological: Negative.    Hematological: Negative.    Psychiatric/Behavioral: Negative.       Objective:     Visit Vitals  BP (!) 148/82   Pulse 87   Temp 98 °F (36.7 °C)   Resp 20   Ht 5' 6" (1.676 m)   Wt 128.4 kg (283 lb)   SpO2 99%   BMI 45.68 kg/m²       Physical Exam  Vitals reviewed.   Constitutional:       Appearance: Normal appearance. She is obese.   HENT:      Head: Normocephalic and atraumatic.      Mouth/Throat:      Mouth: Mucous membranes are moist.      Pharynx: Oropharynx is clear.   Eyes:      Extraocular Movements: Extraocular movements intact.      Conjunctiva/sclera: " Conjunctivae normal.      Pupils: Pupils are equal, round, and reactive to light.   Cardiovascular:      Rate and Rhythm: Normal rate and regular rhythm.      Heart sounds: Normal heart sounds.   Pulmonary:      Effort: Pulmonary effort is normal.      Breath sounds: Normal breath sounds.   Abdominal:      General: Bowel sounds are normal.   Musculoskeletal:         General: Normal range of motion.      Cervical back: Normal range of motion.   Skin:     General: Skin is warm and dry.   Neurological:      Mental Status: She is alert and oriented to person, place, and time.   Psychiatric:         Mood and Affect: Mood normal.         Behavior: Behavior normal.       Labs Reviewed:     Hematology:  Lab Results   Component Value Date    WBC 7.6 12/09/2022    HGB 11.0 (L) 12/09/2022    HCT 36.5 (L) 12/09/2022     12/09/2022     Chemistry:  Lab Results   Component Value Date     12/09/2022    K 4.2 12/09/2022    CHLORIDE 110 (H) 12/09/2022    BUN 14.7 12/09/2022    CREATININE 0.85 12/09/2022    EGFRNORACEVR >60 12/09/2022    GLUCOSE 83 12/09/2022    CALCIUM 9.0 12/09/2022    ALKPHOS 155 (H) 12/09/2022    LABPROT 8.0 (H) 12/09/2022    ALBUMIN 3.2 (L) 12/09/2022    BILIDIR 0.2 03/15/2022    IBILI 0.10 03/15/2022    AST 19 12/09/2022    ALT 18 12/09/2022    MG 1.70 08/28/2021    PHOS 3.5 08/22/2021    ZBCGYQOD21CQ 12.1 (L) 10/04/2022     Lab Results   Component Value Date    HGBA1C 6.3 10/04/2022     Lipid Panel:  Lab Results   Component Value Date    CHOL 139 10/04/2022    HDL 33 (L) 10/04/2022    LDL 88.00 10/04/2022    TRIG 91 10/04/2022    TOTALCHOLEST 4 10/04/2022     Thyroid:  Lab Results   Component Value Date    TSH 0.1582 (L) 12/09/2022    T3FREE 3.28 12/09/2022     Urine:  Lab Results   Component Value Date    COLORUA Light-Yellow 12/09/2022    APPEARANCEUA Clear 12/09/2022    SGUA 1.015 12/09/2022    PHUA 5.5 12/09/2022    PROTEINUA Negative 12/09/2022    GLUCOSEUA Normal 12/09/2022    KETONESUA  Negative 12/09/2022    BLOODUA Trace (A) 12/09/2022    NITRITESUA Negative 12/09/2022    LEUKOCYTESUR 500 (A) 12/09/2022    RBCUA 0-5 12/09/2022    WBCUA 21-50 (A) 12/09/2022    BACTERIA Occ (A) 12/09/2022    SQEPUA Moderate (A) 12/09/2022    HYALINECASTS None Seen 12/09/2022    CREATRANDUR 49.4 10/04/2022        Assessment:       ICD-10-CM ICD-9-CM   1. Other chronic pain  G89.29 338.29   2. Allergic reaction, initial encounter  T78.40XA 995.3   3. Primary hypertension  I10 401.9   4. Other hyperlipidemia  E78.49 272.4   5. Personal history of other venous thrombosis and embolism  Z86.718 V12.51   6. Abnormal TSH  R79.89 790.6   7. Gastro-esophageal reflux disease without esophagitis  K21.9 530.81   8. Class 3 severe obesity due to excess calories with serious comorbidity and body mass index (BMI) of 45.0 to 49.9 in adult  E66.01 278.01    Z68.42 V85.42        Plan:     1. Other chronic pain  Unable to take NSAIDs due to Xarelto use  Tylenol OTC as directed for pain  - Ambulatory referral/consult to Pain Clinic; Future    2. Allergic reaction, initial encounter  Stop Keflex  Call Urology Clinic to notify that antibiotic stopped  Start Hydrocortisone topical cream bid until rash resolved    3. Primary hypertension  Vitals:    02/01/23 0834   BP: (!) 148/82   Pulse:    Resp:    Temp:       Urine Creatinine   Date Value Ref Range Status   10/04/2022 49.4 47.0 - 110.0 mg/dL Final   Urine Microalbumin: 21.2  Results for orders placed or performed in visit on 01/19/23   IN OFFICE EKG 12-LEAD (to [place])    Collection Time: 01/19/23 11:19 AM    Narrative    Test Reason : I10,R06.09,U09.9,E66.01,Z68.42,    Vent. Rate : 075 BPM     Atrial Rate : 075 BPM     P-R Int : 140 ms          QRS Dur : 072 ms      QT Int : 392 ms       P-R-T Axes : 061 -07 -06 degrees     QTc Int : 437 ms    Normal sinus rhythm  Minimal voltage criteria for LVH, may be normal variant  Cannot rule out Anterior infarct ,age undetermined  T wave  abnormality, consider lateral ischemia  Abnormal ECG  No previous ECGs available  Confirmed by Deuce Sy MD ([id number]) on 1/19/2023 2:01:49 PM    Referred By:             Confirmed By:Deuce Sy MD   Continue Amlodipine 10 mg daily, Lasix 20 mg daily  DASH diet  Encouraged home blood pressure monitoring    4. Other hyperlipidemia  Continue Simvastatin 20 mg Qhs  Weight loss encouraged  Low fat/high fiber diet  Increase physical activity  Cholesterol Total   Date Value Ref Range Status   10/04/2022 139 <=200 mg/dL Final     HDL Cholesterol   Date Value Ref Range Status   10/04/2022 33 (L) 35 - 60 mg/dL Final     Triglyceride   Date Value Ref Range Status   10/04/2022 91 37 - 140 mg/dL Final     LDL Cholesterol   Date Value Ref Range Status   10/04/2022 88.00 50.00 - 140.00 mg/dL Final     5. Personal history of other venous thrombosis and embolism  Followed by Cardiology  Continue Xarelto 20 mg daily    6. Abnormal TSH  TSH level: 0.15  T4: 0.90  T3: 3.28  Likely subclinical    7. Gastro-esophageal reflux disease without esophagitis  Continue Pantoprazole  Avoid spicy foods  Remain in an upright position for at least 30 minutes after consuming meals    8. Class 3 severe obesity due to excess calories with serious comorbidity and body mass index (BMI) of 45.0 to 49.9 in adult  Body mass index is 45.68 kg/m².  Thyroid Stimulating Hormone   Date Value Ref Range Status   12/09/2022 0.1582 (L) 0.3500 - 4.9400 uIU/mL Final     Vit D 25 OH   Date Value Ref Range Status   10/04/2022 12.1 (L) 30.0 - 80.0 ng/mL Final     Hemoglobin A1c   Date Value Ref Range Status   10/04/2022 6.3 <=7.0 % Final   Sleep Study: none noted  Weight Loss Encouraged  Increase Physical Activity      Follow up if symptoms worsen or fail to improve. In addition to their scheduled follow up, the patient has also been instructed to follow up on as needed basis.     Tiffany Harris, RENU

## 2023-03-06 ENCOUNTER — HOSPITAL ENCOUNTER (EMERGENCY)
Facility: HOSPITAL | Age: 68
Discharge: HOME | End: 2023-03-06
Attending: EMERGENCY MEDICINE
Payer: MEDICARE

## 2023-03-06 VITALS
OXYGEN SATURATION: 98 % | DIASTOLIC BLOOD PRESSURE: 84 MMHG | RESPIRATION RATE: 18 BRPM | HEIGHT: 66 IN | HEART RATE: 88 BPM | BODY MASS INDEX: 44.2 KG/M2 | TEMPERATURE: 98 F | WEIGHT: 275 LBS | SYSTOLIC BLOOD PRESSURE: 159 MMHG
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
## 2023-03-06 DIAGNOSIS — G89.29 CHRONIC NECK PAIN: ICD-10-CM

## 2023-03-06 DIAGNOSIS — R07.9 CHEST PAIN: ICD-10-CM

## 2023-03-06 DIAGNOSIS — G89.29 ACUTE EXACERBATION OF CHRONIC LOW BACK PAIN: ICD-10-CM

## 2023-03-06 DIAGNOSIS — M54.2 CHRONIC NECK PAIN: ICD-10-CM

## 2023-03-06 DIAGNOSIS — Z79.01 ANTICOAGULATED BY ANTICOAGULATION TREATMENT: ICD-10-CM

## 2023-03-06 DIAGNOSIS — S16.1XXA STRAIN OF NECK MUSCLE, INITIAL ENCOUNTER: ICD-10-CM

## 2023-03-06 DIAGNOSIS — S06.9X1A CLOSED HEAD INJURY WITH BRIEF LOSS OF CONSCIOUSNESS: Primary | ICD-10-CM

## 2023-03-06 DIAGNOSIS — M54.50 ACUTE EXACERBATION OF CHRONIC LOW BACK PAIN: ICD-10-CM

## 2023-03-06 LAB
ALBUMIN SERPL-MCNC: 3.2 G/DL (ref 3.4–4.8)
ALBUMIN/GLOB SERPL: 0.6 RATIO (ref 1.1–2)
ALP SERPL-CCNC: 164 UNIT/L (ref 40–150)
ALT SERPL-CCNC: 19 UNIT/L (ref 0–55)
AST SERPL-CCNC: 33 UNIT/L (ref 5–34)
BASOPHILS # BLD AUTO: 0.03 X10(3)/MCL (ref 0–0.2)
BASOPHILS NFR BLD AUTO: 0.3 %
BILIRUBIN DIRECT+TOT PNL SERPL-MCNC: 0.3 MG/DL
BUN SERPL-MCNC: 10 MG/DL (ref 9.8–20.1)
CALCIUM SERPL-MCNC: 8.7 MG/DL (ref 8.4–10.2)
CHLORIDE SERPL-SCNC: 110 MMOL/L (ref 98–107)
CO2 SERPL-SCNC: 24 MMOL/L (ref 23–31)
CREAT SERPL-MCNC: 0.84 MG/DL (ref 0.55–1.02)
EOSINOPHIL # BLD AUTO: 0.28 X10(3)/MCL (ref 0–0.9)
EOSINOPHIL NFR BLD AUTO: 3.1 %
ERYTHROCYTE [DISTWIDTH] IN BLOOD BY AUTOMATED COUNT: 16.7 % (ref 11.5–17)
GFR SERPLBLD CREATININE-BSD FMLA CKD-EPI: >60 MLS/MIN/1.73/M2
GLOBULIN SER-MCNC: 5 GM/DL (ref 2.4–3.5)
GLUCOSE SERPL-MCNC: 87 MG/DL (ref 82–115)
HCT VFR BLD AUTO: 33.5 % (ref 37–47)
HGB BLD-MCNC: 10.4 G/DL (ref 12–16)
IMM GRANULOCYTES # BLD AUTO: 0.05 X10(3)/MCL (ref 0–0.04)
IMM GRANULOCYTES NFR BLD AUTO: 0.6 %
INR BLD: 1.01 (ref 0–1.3)
LYMPHOCYTES # BLD AUTO: 2.26 X10(3)/MCL (ref 0.6–4.6)
LYMPHOCYTES NFR BLD AUTO: 25.4 %
MCH RBC QN AUTO: 22.5 PG
MCHC RBC AUTO-ENTMCNC: 31 G/DL (ref 33–36)
MCV RBC AUTO: 72.4 FL (ref 80–94)
MONOCYTES # BLD AUTO: 0.63 X10(3)/MCL (ref 0.1–1.3)
MONOCYTES NFR BLD AUTO: 7.1 %
NEUTROPHILS # BLD AUTO: 5.66 X10(3)/MCL (ref 2.1–9.2)
NEUTROPHILS NFR BLD AUTO: 63.5 %
NRBC BLD AUTO-RTO: 0 %
PLATELET # BLD AUTO: 258 X10(3)/MCL (ref 130–400)
PMV BLD AUTO: 10.9 FL (ref 7.4–10.4)
POTASSIUM SERPL-SCNC: 4.6 MMOL/L (ref 3.5–5.1)
PROT SERPL-MCNC: 8.2 GM/DL (ref 5.8–7.6)
PROTHROMBIN TIME: 13.2 SECONDS (ref 12.5–14.5)
RBC # BLD AUTO: 4.63 X10(6)/MCL (ref 4.2–5.4)
SODIUM SERPL-SCNC: 143 MMOL/L (ref 136–145)
WBC # SPEC AUTO: 8.9 X10(3)/MCL (ref 4.5–11.5)

## 2023-03-06 PROCEDURE — 99285 EMERGENCY DEPT VISIT HI MDM: CPT | Mod: 25

## 2023-03-06 PROCEDURE — 85610 PROTHROMBIN TIME: CPT | Performed by: EMERGENCY MEDICINE

## 2023-03-06 PROCEDURE — 63600175 PHARM REV CODE 636 W HCPCS: Performed by: EMERGENCY MEDICINE

## 2023-03-06 PROCEDURE — 96374 THER/PROPH/DIAG INJ IV PUSH: CPT

## 2023-03-06 PROCEDURE — 96375 TX/PRO/DX INJ NEW DRUG ADDON: CPT

## 2023-03-06 PROCEDURE — 96376 TX/PRO/DX INJ SAME DRUG ADON: CPT

## 2023-03-06 PROCEDURE — 25000003 PHARM REV CODE 250: Performed by: EMERGENCY MEDICINE

## 2023-03-06 PROCEDURE — 80053 COMPREHEN METABOLIC PANEL: CPT | Performed by: EMERGENCY MEDICINE

## 2023-03-06 PROCEDURE — 85025 COMPLETE CBC W/AUTO DIFF WBC: CPT | Performed by: EMERGENCY MEDICINE

## 2023-03-06 RX ORDER — SODIUM CHLORIDE 9 MG/ML
INJECTION, SOLUTION INTRAVENOUS
Status: COMPLETED | OUTPATIENT
Start: 2023-03-06 | End: 2023-03-06

## 2023-03-06 RX ORDER — FENTANYL CITRATE 50 UG/ML
50 INJECTION, SOLUTION INTRAMUSCULAR; INTRAVENOUS
Status: COMPLETED | OUTPATIENT
Start: 2023-03-06 | End: 2023-03-06

## 2023-03-06 RX ORDER — HYDROCODONE BITARTRATE AND ACETAMINOPHEN 5; 325 MG/1; MG/1
1 TABLET ORAL EVERY 12 HOURS PRN
Qty: 10 TABLET | Refills: 0 | Status: SHIPPED | OUTPATIENT
Start: 2023-03-06 | End: 2023-04-04

## 2023-03-06 RX ORDER — ONDANSETRON 2 MG/ML
INJECTION INTRAMUSCULAR; INTRAVENOUS
Status: DISCONTINUED
Start: 2023-03-06 | End: 2023-03-06 | Stop reason: HOSPADM

## 2023-03-06 RX ORDER — FENTANYL CITRATE 50 UG/ML
INJECTION, SOLUTION INTRAMUSCULAR; INTRAVENOUS CODE/TRAUMA/SEDATION MEDICATION
Status: COMPLETED | OUTPATIENT
Start: 2023-03-06 | End: 2023-03-06

## 2023-03-06 RX ORDER — ONDANSETRON 2 MG/ML
INJECTION INTRAMUSCULAR; INTRAVENOUS CODE/TRAUMA/SEDATION MEDICATION
Status: COMPLETED | OUTPATIENT
Start: 2023-03-06 | End: 2023-03-06

## 2023-03-06 RX ORDER — FENTANYL CITRATE 50 UG/ML
INJECTION, SOLUTION INTRAMUSCULAR; INTRAVENOUS
Status: DISCONTINUED
Start: 2023-03-06 | End: 2023-03-06 | Stop reason: HOSPADM

## 2023-03-06 RX ADMIN — FENTANYL CITRATE 50 MCG: 50 INJECTION INTRAMUSCULAR; INTRAVENOUS at 03:03

## 2023-03-06 RX ADMIN — FENTANYL CITRATE 50 MCG: 50 INJECTION, SOLUTION INTRAMUSCULAR; INTRAVENOUS at 01:03

## 2023-03-06 RX ADMIN — SODIUM CHLORIDE 1000 ML: 9 INJECTION, SOLUTION INTRAVENOUS at 01:03

## 2023-03-06 RX ADMIN — ONDANSETRON 4 MG: 2 INJECTION INTRAMUSCULAR; INTRAVENOUS at 01:03

## 2023-03-06 NOTE — FIRST PROVIDER EVALUATION
Medical screening examination initiated.  I have conducted a focused provider triage encounter, findings are as follows:    Brief history of present illness:  68 y/o female who presents with walking and slipped back and she fell to ground hitting back of head on concrete. She states she thinks loss consciousness. She is on xarelto. C/o head pain, neck pain, left low back / hip pain.     There were no vitals filed for this visit.    Pertinent physical exam:  alert, using rolling walker but does appear short winded    Brief workup plan:  imaging    Preliminary workup initiated; this workup will be continued and followed by the physician or advanced practice provider that is assigned to the patient when roomed.

## 2023-03-06 NOTE — ED PROVIDER NOTES
Encounter Date: 3/6/2023    SCRIBE #1 NOTE: I, Maik Felix, am scribing for, and in the presence of,  Vicky Mendez MD. I have scribed the following portions of the note - Other sections scribed: HPI, ROS, PE.   SCRIBE #2 NOTE: I, Kat Roberto, am scribing for, and in the presence of,  Vicky Mendez MD. I have scribed the remaining portions of the note not scribed by Scribe #1. Other sections scribed: HPI, ROS, PE.   History     Chief Complaint   Patient presents with    Headache     Pt fell backward from walker and hit head on concrete, on xarelto. C/o headache.      67 year old female with a past medical history of pulmonary embolism on xarelto, HTN, and HLP presents to the ED as a level 2 trauma via her personal vehicle for a fall.  Pt reports that she was trying to sit down when her chair slipped and she fell on her back and hit the back of her head.  Pt  was given a C-collar upon arrival and reports of head, neck, left back pain, knee pain, and dizziness..     The history is provided by the patient. No  was used.   Headache   This is a new problem. The current episode started today. The problem occurs constantly. The problem has been unchanged. The pain is located in the Left unilateral region. The pain radiates to the lower back. Associated symptoms include back pain, dizziness and neck pain. Pertinent negatives include no abdominal pain, fever, nausea or vomiting.   Review of patient's allergies indicates:   Allergen Reactions    Penicillins Hives    Aspirin Rash    Keflex [cephalexin] Rash    Tramadol Rash     Past Medical History:   Diagnosis Date    Hyperlipidemia     Hypertension     Personal history of colonic polyps 04/14/2022     Past Surgical History:   Procedure Laterality Date    COLONOSCOPY  04/14/2022     Family History   Problem Relation Age of Onset    Heart attack Mother     Thyroid disease Mother     Diabetes Father     Thyroid disease Father     Heart attack  Father      Social History     Tobacco Use    Smoking status: Never     Passive exposure: Current    Smokeless tobacco: Never   Substance Use Topics    Alcohol use: Not Currently    Drug use: Never     Review of Systems   Constitutional:  Negative for fatigue and fever.   Eyes:  Negative for visual disturbance.   Respiratory:  Negative for chest tightness and shortness of breath.    Cardiovascular:  Negative for chest pain.   Gastrointestinal:  Negative for abdominal pain, diarrhea, nausea and vomiting.   Genitourinary:  Negative for dysuria and hematuria.   Musculoskeletal:  Positive for back pain and neck pain.        Knee pain.   Skin:  Negative for rash.   Allergic/Immunologic: Negative for immunocompromised state.   Neurological:  Positive for dizziness and headaches.     Physical Exam     Initial Vitals [03/06/23 1345]   BP Pulse Resp Temp SpO2   (!) 176/117 110 16 97.9 °F (36.6 °C) 97 %      MAP       --         Physical Exam    Nursing note and vitals reviewed.  Constitutional: She appears well-developed and well-nourished. No distress.   Airway intact.   HENT:   Head: Normocephalic.   Mouth/Throat: Oropharynx is clear and moist.   Small right occipital hematoma.   Eyes: Conjunctivae are normal. Pupils are equal, round, and reactive to light.   Neck: Neck supple.   Normal range of motion.  Cardiovascular:  Regular rhythm and intact distal pulses.   Tachycardia present.         No murmur heard.  Pulses:       Radial pulses are 2+ on the right side and 2+ on the left side.        Dorsalis pedis pulses are 2+ on the right side and 2+ on the left side.   Pulmonary/Chest: Breath sounds normal. No respiratory distress.   Symmetric bilateral breath sounds.   Abdominal: Abdomen is soft. She exhibits no distension. There is no abdominal tenderness.   Musculoskeletal:         General: Normal range of motion.      Cervical back: Normal range of motion and neck supple. Tenderness present.      Thoracic back: Tenderness  present.      Lumbar back: Tenderness present.      Comments: Able to stand up and turn.  Actively resisting straightening L leg, diffusely tender in the LLE, no deformities.   Right lower extremity full ROM without pain, no tenderness to palpation.   Left parascapular tenderness, diffuse thoracic tenderness, diffuse lumbar tenderness, diffuse cervical tenderness, no step-offs or deformities. No contusions or erythema.      Neurological: She is alert and oriented to person, place, and time. GCS score is 15. GCS eye subscore is 4. GCS verbal subscore is 5. GCS motor subscore is 6.   Skin: Skin is warm and dry.   No contusions, no gross deformities.    Psychiatric: Her mood appears anxious.       ED Course   Procedures  Labs Reviewed   COMPREHENSIVE METABOLIC PANEL - Abnormal; Notable for the following components:       Result Value    Chloride 110 (*)     Protein Total 8.2 (*)     Albumin Level 3.2 (*)     Globulin 5.0 (*)     Albumin/Globulin Ratio 0.6 (*)     Alkaline Phosphatase 164 (*)     All other components within normal limits   CBC WITH DIFFERENTIAL - Abnormal; Notable for the following components:    Hgb 10.4 (*)     Hct 33.5 (*)     MCV 72.4 (*)     MCHC 31.0 (*)     MPV 10.9 (*)     IG# 0.05 (*)     All other components within normal limits   PROTIME-INR - Normal   CBC W/ AUTO DIFFERENTIAL    Narrative:     The following orders were created for panel order CBC auto differential.  Procedure                               Abnormality         Status                     ---------                               -----------         ------                     CBC with Differential[[id number]]        Abnormal            Final result                 Please view results for these tests on the individual orders.          Imaging Results              CT Cervical Spine Without Contrast (Final result)  Result time 03/06/23 14:39:01      Final result by Devante Zhoa MD (03/06/23 14:39:01)                    Impression:      Loss of the normal lordotic curve of the cervical spine most likely related to spasm but otherwise unremarkable with no evidence of acute fracture or dislocation seen    Incidental note is made of some degenerative changes in the lower cervical spine      Electronically signed by: Devante Zhao  Date:    03/06/2023  Time:    14:39               Narrative:    EXAMINATION:  CT CERVICAL SPINE WITHOUT CONTRAST    CLINICAL HISTORY:  Neck trauma (Age >= 65y);    TECHNIQUE:  Low dose axial images, sagittal and coronal reformations were performed though the cervical spine.  Contrast was not administered. Automatic exposure control is utilized to reduce patient radiation exposure.    COMPARISON:  None    FINDINGS:  The vertebral body heights are well maintained. There is some loss of the normal lordotic curve cervical spine most likely related to spasm. No fracture is seen. No dislocation is seen. The odontoid and lateral masses appear grossly unremarkable.  There are some degenerative changes seen in the lower cervical spine which appear to be chronic.                                       CT Head Without Contrast (Final result)  Result time 03/06/23 14:35:13      Final result by Ottoniel Whitmore MD (03/06/23 14:35:13)                   Impression:      No acute intracranial abnormality identified.  Findings of chronic microvascular ischemic disease.      Electronically signed by: Ottoniel Whitmore  Date:    03/06/2023  Time:    14:35               Narrative:    EXAMINATION:  CT HEAD WITHOUT CONTRAST    CLINICAL HISTORY:  Head trauma, minor (Age >= 65y);    TECHNIQUE:  Low dose axial images were obtained through the head.  Coronal and sagittal reformations were also performed. Contrast was not administered.    Automatic exposure control was utilized to reduce the patient's radiation dose.    DLP= 1601    COMPARISON:  08/20/2021    FINDINGS:  No acute intracranial hemorrhage, edema or mass. No acute  parenchymal abnormality.    There is no hydrocephalus, evidence of herniation or midline shift. The ventricles and sulci are normal.    Scattered hypodensities throughout the deep periventricular white matter.    Hematoma overlies the occipital bone with no underlying fracture.    The mastoid air cells are clear.    The auditory canals are patent bilaterally.    The globes and orbital contents are normal bilaterally.    The visualized maxillary, ethmoid and sphenoid sinuses are clear.                                       CT Thoracic Spine Without Contrast (Final result)  Result time 03/06/23 14:41:13      Final result by Lang Hernandez MD (03/06/23 14:41:13)                   Narrative:    EXAMINATION  CT THORACIC SPINE WITHOUT CONTRAST    CLINICAL HISTORY  Mid-back pain, compression fracture suspected;GLF. Trauma.;  level 2    TECHNIQUE  Helical-acquisition CT images were obtained without the use of intravenous contrast.  Multiplanar reconstructions accomplished by a CT technologist at a separate workstation and pushed to PACS for physician review.    COMPARISON  *No recent cross-sectional comparison imaging is available.  *Chest radiographs utilizing two view technique 7 February 2020 were reviewed.    FINDINGS  Images were reviewed and soft tissue and bone windows.    Exam quality: Adequate for spinal column assessment. (Evaluation of the spinal canal contents and nonosseous tissues is inherently limited by the absence intrathecal/intravascular contrast administration.)    Vertebral segments: No convincing acute cortical disruption or retropulsion is identified.  No compression deformity or focal vertebral body abnormality. No evidence of traumatic subluxation. Degenerative endplate and facet changes are appreciated throughout the visualized spinal column. Chronic straightening of the upper thoracic spine and exaggerated lower thoracic kyphosis are similar to the radiographic appearance referenced above.  Mild  rightward dextroconvexity is also appreciated on the coronal reconstructions, apical at T6.    Disc spaces: There are degenerative intervertebral changes with associated disc height loss.    Other findings: No prevertebral thickening, expansile collection, or focal contour irregularity. No acute muscular abnormality. No evidence of acute process or concerning focal abnormality within the visualized chest/abdomen/retroperitoneum. Scattered calcification of the coronary and aortic vasculature incidentally noted.  No abnormal vessel dilatation is identified.    IMPRESSION  1. No convincing evidence of acute spinal column abnormality.  2. Multilevel degenerative changes and associated chronic alteration of the normal thoracic curvature.  3. Incidental scattered coronary and aortic calcification.    RADIATION DOSE  Automated tube current modulation, weight-based exposure dosing, and/or iterative reconstruction technique utilized to reach lowest reasonably achievable exposure rate.    DLP: 443 mGy*cm      Electronically signed by: Lang Hernandez  Date:    03/06/2023  Time:    14:41                                     CT Lumbar Spine Without Contrast (Final result)  Result time 03/06/23 14:46:19      Final result by Devante Zhao MD (03/06/23 14:46:19)                   Impression:      Chronic changes seen as outlined above.  No evidence of acute fracture or dislocation seen      Electronically signed by: Devante Zhao  Date:    03/06/2023  Time:    14:46               Narrative:    EXAMINATION:  CT LUMBAR SPINE WITHOUT CONTRAST    CLINICAL HISTORY:  Low back pain, increased fracture risk;GLF. Trauma.;    TECHNIQUE:  Low-dose axial images with sagittal and coronal reformations are obtained through the lumbar spine.  Contrast was not administered.  Automatic exposure control (AEC) is utilized to reduce patient radiation exposure.    COMPARISON:  Plain film from 12/11 17 and CT scan of the abdomen pelvis from  11/20/2022    FINDINGS:  The vertebral body heights are well maintained.  There is some kyphotic curvature to the thoracolumbar scarring.  This was seen on the prior examination as well.  The bones are diffusely osteoporotic.  Multilevel facet arthropathy seen.  Multilevel foraminal stenosis is seen.  There is some ankylosing of the lower thoracic and upper lumbar spine.  Multiple syndesmophytes are seen.    No evidence of bowel acute fracture is seen.  The no transverse process or spinous process fracture is seen.    Soft tissue window shows evidence of a calcified uterine fibroid in the fundus of the uterus                                       X-Ray Tibia Fibula 2 View Left (Final result)  Result time 03/06/23 14:22:18      Final result by Ottoniel Whitmore MD (03/06/23 14:22:18)                   Impression:      No acute fracture.  Mild degenerative changes are noted.      Electronically signed by: Ottoniel Whitmore  Date:    03/06/2023  Time:    14:22               Narrative:    EXAMINATION:  XR TIBIA FIBULA 2 VIEW LEFT    CLINICAL HISTORY:  Unspecified fall, initial encounter    TECHNIQUE:  Two views of the left tibia and fibula.    COMPARISON:  No prior imaging available for comparison    FINDINGS:  No acute fracture.  Prominent soft tissues.  Degenerative changes with tricompartmental osteophytes at the knee.                                       X-Ray Femur Ap/Lat Left (Final result)  Result time 03/06/23 14:26:03      Final result by Ottoniel Whitmore MD (03/06/23 14:26:03)                   Impression:      As above.      Electronically signed by: Ottoniel Whitmore  Date:    03/06/2023  Time:    14:26               Narrative:    EXAMINATION:  XR FEMUR 2 VIEW LEFT    CLINICAL HISTORY:  Unspecified fall, initial encounter    TECHNIQUE:  Two views of the left femur.    COMPARISON:  No prior imaging available for comparison    FINDINGS:  The proximal femur is not identified secondary to prominent soft tissues.   Mild degenerative changes at the knee.                                       X-Ray Pelvis Routine AP (Final result)  Result time 03/06/23 14:23:11      Final result by Ottoniel Whitmore MD (03/06/23 14:23:11)                   Impression:      No definite displaced fracture appreciated.  Osseous structures are poorly visualized.  If continued pain recommend repeat imaging.      Electronically signed by: Ottoniel Whitmore  Date:    03/06/2023  Time:    14:23               Narrative:    EXAMINATION:  XR PELVIS ROUTINE AP    CLINICAL HISTORY:  GLF. Trauma.;    TECHNIQUE:  Single view of the pelvis.    COMPARISON:  No prior imaging available for comparison    FINDINGS:  Limited evaluation secondary to poor penetration.  No displaced fracture.  Degenerative changes with bilateral acetabular osteophytes.                                       X-Ray Chest 1 View (Final result)  Result time 03/06/23 14:20:32      Final result by Ottoniel Whitmore MD (03/06/23 14:20:32)                   Impression:      No acute cardiopulmonary process.      Electronically signed by: Ottoniel Whitmore  Date:    03/06/2023  Time:    14:20               Narrative:    EXAMINATION:  XR CHEST 1 VIEW    CLINICAL HISTORY:  Chest pain, unspecified    TECHNIQUE:  Single view of the chest    COMPARISON:  11/20/2022    FINDINGS:  No focal opacification, pleural effusion, or pneumothorax.    The cardiomediastinal silhouette is within normal limits.    No acute osseous abnormality.                                       Medications   fentaNYL 50 mcg/mL injection (50 mcg Intravenous Given 3/6/23 1346)   0.9%  NaCl infusion (1,000 mLs Intravenous New Bag 3/6/23 1347)   ondansetron injection (4 mg Intravenous Given 3/6/23 1347)   fentaNYL injection 50 mcg (50 mcg Intravenous Given 3/6/23 1500)        Medical Decision Making  Problems Addressed:  Acute exacerbation of chronic low back pain: acute illness or injury  Anticoagulated by anticoagulation treatment:  chronic illness or injury with exacerbation, progression, or side effects of treatment  Chronic neck pain: chronic illness or injury with exacerbation, progression, or side effects of treatment  Closed head injury with brief loss of consciousness: acute illness or injury  Fall: acute illness or injury  Strain of neck muscle, initial encounter: acute illness or injury      ED assessment:    Ms. Martinez presented for evaluation after fall with head injury, on anticoagulant therapy.  Complaining of back pain and lower extremity pain, has chronic issues with this however exacerbated following fall.  Currently, able to move all extremity, no indication of acute paralysis or suggestion of acute cord/neurologic compromise.    Differential diagnosis (including but not limited to):   Closed head injury, intracranial hemorrhage, cerebral contusion, concussion, cervical spine fracture, subluxation or strain, thoracic or lumbar spine fracture, subluxation, or strain, contusion to the back and lower extremities    ED management:   See ED course below    My independent radiology interpretation:   Chest x-ray: No displaced rib fracture, no pneumothorax, normal cardiomediastinal silhouette   X-ray pelvis:  No displaced fracture or subluxation  X-ray femur:  No acute fracture or subluxation     Amount and/or Complexity of Data Reviewed  Independent historian: daughter    Summary of history:  Daughter at bedside reports patient with similar pain complaints chronically, intermittently prescribed analgesics as an outpatient, currently not taking anything  External data reviewed: notes from clinic visits  Summary of data reviewed:  Clinic visit from 02/01/2023 reviewed, patient with chronic back pain knee pain, hip pain complaints.  Referred to [facility] per their documentation; however, reportedly did not show for the appointment.  Risk and benefits of testing: discussed   Labs: ordered and reviewed  Radiology: ordered and  independent interpretation performed (see above or ED course)  Risk  Prescription drug management   Parenteral controlled substances   Shared decision making     Critical Care  none    I, Vicky Mendez MD personally performed the history, PE, MDM, and procedures as documented above and agree with the scribe's documentation.           Scribe Attestation:   Scribe #1: I performed the above scribed service and the documentation accurately describes the services I performed. I attest to the accuracy of the note.  Scribe #2: I performed the above scribed service and the documentation accurately describes the services I performed. I attest to the accuracy of the note.    Attending Attestation:           Physician Attestation for Scribe:  Physician Attestation Statement for Scribe #1: I, Vicky Mendez MD, reviewed documentation, as scribed by Maik Felix in my presence, and it is both accurate and complete.   Physician Attestation Statement for Scribe #2: Vicky MICHELLE MD, reviewed documentation, as scribed by Kat Roberto in my presence, and it is both accurate and complete. I also acknowledge and confirm the content of the note done by Scribe #1.        ED Course as of 03/23/23 1255   Mon Mar 06, 2023   1540 Imaging with no clinically significant acute traumatic injuries.  She is resting comfortably at this time, I requested that she could be turned on her side as she, at baseline, has neck and back pain and was uncomfortable lying on her back.  Daughter at bedside reports that her pain complaints are baseline for her.  Laboratory studies similarly with no acute findings.  Will attempt a p.o. challenge in ambulation in the ED. [KS]   1607 Patient ambulated to and from the restroom without difficulty.  She has tolerated tramadol in the past.  Given the acute exacerbation of chronic pain and inability to use NSAID medications due to anticoagulated status, will provide a short course of oral tramadol to help  with pain control at home.  Advised follow-up with her primary care physician if having ongoing/further pain issues or return immediately for any new or worsening or emergent concerns. [KS]      ED Course User Index  [KS] Vicky Mendez MD                   Clinical Impression:   Final diagnoses:  [R07.9] Chest pain  [W19.XXXA] Fall  [S06.9X1A] Closed head injury with brief loss of consciousness (Primary)  [M54.50, G89.29] Acute exacerbation of chronic low back pain  [S16.1XXA] Strain of neck muscle, initial encounter  [M54.2, G89.29] Chronic neck pain  [Z79.01] Anticoagulated by anticoagulation treatment        ED Disposition Condition    Discharge Stable          ED Prescriptions       Medication Sig Dispense Start Date End Date Auth. Provider    HYDROcodone-acetaminophen (NORCO) 5-325 mg per tablet  Take 1 tablet by mouth every 12 (twelve) hours as needed for Pain. 10 tablet 3/6/2023 3/11/2023 Vicky Mendez MD          Follow-up Information       Follow up With Specialties Details Why Contact Info    [facility] Schedule an appointment as soon as possible for a visit   [address] [facility] [id number]  [phone]      [facility] - Emergency Dept Emergency Medicine  As needed, If symptoms worsen [address] [facility] [id number] [phone]             Vicky Mendez MD  03/23/23 5011

## 2023-03-31 ENCOUNTER — APPOINTMENT (OUTPATIENT)
Dept: LAB | Facility: HOSPITAL | Age: 68
End: 2023-03-31
Attending: NURSE PRACTITIONER
Payer: MEDICARE

## 2023-03-31 DIAGNOSIS — I10 PRIMARY HYPERTENSION: ICD-10-CM

## 2023-03-31 DIAGNOSIS — R79.89 ABNORMAL TSH: ICD-10-CM

## 2023-03-31 DIAGNOSIS — E78.49 OTHER HYPERLIPIDEMIA: Chronic | ICD-10-CM

## 2023-03-31 LAB
ALBUMIN SERPL-MCNC: 3.2 G/DL (ref 3.4–4.8)
ALBUMIN/GLOB SERPL: 0.7 RATIO (ref 1.1–2)
ALP SERPL-CCNC: 147 UNIT/L (ref 40–150)
ALT SERPL-CCNC: 15 UNIT/L (ref 0–55)
AST SERPL-CCNC: 19 UNIT/L (ref 5–34)
BASOPHILS # BLD AUTO: 0.03 X10(3)/MCL (ref 0–0.2)
BASOPHILS NFR BLD AUTO: 0.3 %
BILIRUBIN DIRECT+TOT PNL SERPL-MCNC: 0.4 MG/DL
BUN SERPL-MCNC: 17 MG/DL (ref 9.8–20.1)
CALCIUM SERPL-MCNC: 9.1 MG/DL (ref 8.4–10.2)
CHLORIDE SERPL-SCNC: 106 MMOL/L (ref 98–107)
CHOLEST SERPL-MCNC: 137 MG/DL
CHOLEST/HDLC SERPL: 4 {RATIO} (ref 0–5)
CO2 SERPL-SCNC: 27 MMOL/L (ref 23–31)
CREAT SERPL-MCNC: 0.84 MG/DL (ref 0.55–1.02)
EOSINOPHIL # BLD AUTO: 0.17 X10(3)/MCL (ref 0–0.9)
EOSINOPHIL NFR BLD AUTO: 2 %
ERYTHROCYTE [DISTWIDTH] IN BLOOD BY AUTOMATED COUNT: 16.2 % (ref 11.5–17)
GFR SERPLBLD CREATININE-BSD FMLA CKD-EPI: >60 MLS/MIN/1.73/M2
GLOBULIN SER-MCNC: 4.9 GM/DL (ref 2.4–3.5)
GLUCOSE SERPL-MCNC: 112 MG/DL (ref 82–115)
HCT VFR BLD AUTO: 34 % (ref 37–47)
HDLC SERPL-MCNC: 32 MG/DL (ref 35–60)
HGB BLD-MCNC: 10.4 G/DL (ref 12–16)
IMM GRANULOCYTES # BLD AUTO: 0.03 X10(3)/MCL (ref 0–0.04)
IMM GRANULOCYTES NFR BLD AUTO: 0.3 %
LDLC SERPL CALC-MCNC: 86 MG/DL (ref 50–140)
LYMPHOCYTES # BLD AUTO: 2.01 X10(3)/MCL (ref 0.6–4.6)
LYMPHOCYTES NFR BLD AUTO: 23.4 %
MCH RBC QN AUTO: 22.6 PG (ref 27–31)
MCHC RBC AUTO-ENTMCNC: 30.6 G/DL (ref 33–36)
MCV RBC AUTO: 73.8 FL (ref 80–94)
MONOCYTES # BLD AUTO: 0.61 X10(3)/MCL (ref 0.1–1.3)
MONOCYTES NFR BLD AUTO: 7.1 %
NEUTROPHILS # BLD AUTO: 5.75 X10(3)/MCL (ref 2.1–9.2)
NEUTROPHILS NFR BLD AUTO: 66.9 %
NRBC BLD AUTO-RTO: 0 %
PLATELET # BLD AUTO: 304 X10(3)/MCL (ref 130–400)
PMV BLD AUTO: 10.2 FL (ref 7.4–10.4)
POTASSIUM SERPL-SCNC: 4.4 MMOL/L (ref 3.5–5.1)
PROT SERPL-MCNC: 8.1 GM/DL (ref 5.8–7.6)
RBC # BLD AUTO: 4.61 X10(6)/MCL (ref 4.2–5.4)
SODIUM SERPL-SCNC: 142 MMOL/L (ref 136–145)
T3FREE SERPL-MCNC: 3.28 PG/ML (ref 1.57–3.91)
T4 FREE SERPL-MCNC: 0.91 NG/DL (ref 0.7–1.48)
TRIGL SERPL-MCNC: 93 MG/DL (ref 37–140)
TSH SERPL-ACNC: 0.12 UIU/ML (ref 0.35–4.94)
VLDLC SERPL CALC-MCNC: 19 MG/DL
WBC # SPEC AUTO: 8.6 X10(3)/MCL (ref 4.5–11.5)

## 2023-03-31 PROCEDURE — 84439 ASSAY OF FREE THYROXINE: CPT

## 2023-03-31 PROCEDURE — 84443 ASSAY THYROID STIM HORMONE: CPT

## 2023-03-31 PROCEDURE — 80053 COMPREHEN METABOLIC PANEL: CPT

## 2023-03-31 PROCEDURE — 85025 COMPLETE CBC W/AUTO DIFF WBC: CPT

## 2023-03-31 PROCEDURE — 80061 LIPID PANEL: CPT

## 2023-03-31 PROCEDURE — 36415 COLL VENOUS BLD VENIPUNCTURE: CPT

## 2023-03-31 PROCEDURE — 84481 FREE ASSAY (FT-3): CPT

## 2023-04-03 RX ORDER — CIPROFLOXACIN 500 MG/1
500 TABLET ORAL 2 TIMES DAILY
Qty: 14 TABLET | Refills: 0 | Status: SHIPPED | OUTPATIENT
Start: 2023-04-03 | End: 2023-04-10

## 2023-04-04 ENCOUNTER — OFFICE VISIT (OUTPATIENT)
Dept: INTERNAL MEDICINE | Facility: CLINIC | Age: 68
End: 2023-04-04
Payer: MEDICARE

## 2023-04-04 VITALS
WEIGHT: 240 LBS | DIASTOLIC BLOOD PRESSURE: 58 MMHG | HEART RATE: 73 BPM | TEMPERATURE: 98 F | HEIGHT: 66 IN | RESPIRATION RATE: 18 BRPM | SYSTOLIC BLOOD PRESSURE: 113 MMHG | BODY MASS INDEX: 38.57 KG/M2

## 2023-04-04 DIAGNOSIS — E66.01 CLASS 3 SEVERE OBESITY DUE TO EXCESS CALORIES WITH SERIOUS COMORBIDITY AND BODY MASS INDEX (BMI) OF 45.0 TO 49.9 IN ADULT: Chronic | ICD-10-CM

## 2023-04-04 DIAGNOSIS — I10 PRIMARY HYPERTENSION: Chronic | ICD-10-CM

## 2023-04-04 DIAGNOSIS — K21.9 GASTRO-ESOPHAGEAL REFLUX DISEASE WITHOUT ESOPHAGITIS: Chronic | ICD-10-CM

## 2023-04-04 DIAGNOSIS — R79.89 ABNORMAL TSH: Primary | ICD-10-CM

## 2023-04-04 DIAGNOSIS — E78.49 OTHER HYPERLIPIDEMIA: Chronic | ICD-10-CM

## 2023-04-04 DIAGNOSIS — Z12.31 ENCOUNTER FOR SCREENING MAMMOGRAM FOR MALIGNANT NEOPLASM OF BREAST: ICD-10-CM

## 2023-04-04 DIAGNOSIS — N30.00 ACUTE CYSTITIS WITHOUT HEMATURIA: ICD-10-CM

## 2023-04-04 PROCEDURE — 3066F NEPHROPATHY DOC TX: CPT | Mod: CPTII,,, | Performed by: NURSE PRACTITIONER

## 2023-04-04 PROCEDURE — 3060F POS MICROALBUMINURIA REV: CPT | Mod: CPTII,,, | Performed by: NURSE PRACTITIONER

## 2023-04-04 PROCEDURE — 1159F PR MEDICATION LIST DOCUMENTED IN MEDICAL RECORD: ICD-10-PCS | Mod: CPTII,,, | Performed by: NURSE PRACTITIONER

## 2023-04-04 PROCEDURE — 1159F MED LIST DOCD IN RCRD: CPT | Mod: CPTII,,, | Performed by: NURSE PRACTITIONER

## 2023-04-04 PROCEDURE — 1101F PR PT FALLS ASSESS DOC 0-1 FALLS W/OUT INJ PAST YR: ICD-10-PCS | Mod: CPTII,,, | Performed by: NURSE PRACTITIONER

## 2023-04-04 PROCEDURE — 1160F PR REVIEW ALL MEDS BY PRESCRIBER/CLIN PHARMACIST DOCUMENTED: ICD-10-PCS | Mod: CPTII,,, | Performed by: NURSE PRACTITIONER

## 2023-04-04 PROCEDURE — 3074F PR MOST RECENT SYSTOLIC BLOOD PRESSURE < 130 MM HG: ICD-10-PCS | Mod: CPTII,,, | Performed by: NURSE PRACTITIONER

## 2023-04-04 PROCEDURE — 99215 OFFICE O/P EST HI 40 MIN: CPT | Mod: PBBFAC | Performed by: NURSE PRACTITIONER

## 2023-04-04 PROCEDURE — 3078F DIAST BP <80 MM HG: CPT | Mod: CPTII,,, | Performed by: NURSE PRACTITIONER

## 2023-04-04 PROCEDURE — 3074F SYST BP LT 130 MM HG: CPT | Mod: CPTII,,, | Performed by: NURSE PRACTITIONER

## 2023-04-04 PROCEDURE — 3008F BODY MASS INDEX DOCD: CPT | Mod: CPTII,,, | Performed by: NURSE PRACTITIONER

## 2023-04-04 PROCEDURE — 3060F PR POS MICROALBUMINURIA RESULT DOCUMENTED/REVIEW: ICD-10-PCS | Mod: CPTII,,, | Performed by: NURSE PRACTITIONER

## 2023-04-04 PROCEDURE — 3288F FALL RISK ASSESSMENT DOCD: CPT | Mod: CPTII,,, | Performed by: NURSE PRACTITIONER

## 2023-04-04 PROCEDURE — 1101F PT FALLS ASSESS-DOCD LE1/YR: CPT | Mod: CPTII,,, | Performed by: NURSE PRACTITIONER

## 2023-04-04 PROCEDURE — 99214 OFFICE O/P EST MOD 30 MIN: CPT | Mod: S$PBB,,, | Performed by: NURSE PRACTITIONER

## 2023-04-04 PROCEDURE — 3288F PR FALLS RISK ASSESSMENT DOCUMENTED: ICD-10-PCS | Mod: CPTII,,, | Performed by: NURSE PRACTITIONER

## 2023-04-04 PROCEDURE — 1160F RVW MEDS BY RX/DR IN RCRD: CPT | Mod: CPTII,,, | Performed by: NURSE PRACTITIONER

## 2023-04-04 PROCEDURE — 3066F PR DOCUMENTATION OF TREATMENT FOR NEPHROPATHY: ICD-10-PCS | Mod: CPTII,,, | Performed by: NURSE PRACTITIONER

## 2023-04-04 PROCEDURE — 1125F PR PAIN SEVERITY QUANTIFIED, PAIN PRESENT: ICD-10-PCS | Mod: CPTII,,, | Performed by: NURSE PRACTITIONER

## 2023-04-04 PROCEDURE — 99214 PR OFFICE/OUTPT VISIT, EST, LEVL IV, 30-39 MIN: ICD-10-PCS | Mod: S$PBB,,, | Performed by: NURSE PRACTITIONER

## 2023-04-04 PROCEDURE — 1125F AMNT PAIN NOTED PAIN PRSNT: CPT | Mod: CPTII,,, | Performed by: NURSE PRACTITIONER

## 2023-04-04 PROCEDURE — 3078F PR MOST RECENT DIASTOLIC BLOOD PRESSURE < 80 MM HG: ICD-10-PCS | Mod: CPTII,,, | Performed by: NURSE PRACTITIONER

## 2023-04-04 PROCEDURE — 3008F PR BODY MASS INDEX (BMI) DOCUMENTED: ICD-10-PCS | Mod: CPTII,,, | Performed by: NURSE PRACTITIONER

## 2023-04-04 RX ORDER — ERGOCALCIFEROL 1.25 MG/1
50000 CAPSULE ORAL
Qty: 12 CAPSULE | Refills: 2 | Status: SHIPPED | OUTPATIENT
Start: 2023-04-04 | End: 2023-09-26 | Stop reason: SDUPTHER

## 2023-04-04 RX ORDER — PANTOPRAZOLE SODIUM 40 MG/1
40 TABLET, DELAYED RELEASE ORAL DAILY
Qty: 90 TABLET | Refills: 2 | Status: SHIPPED | OUTPATIENT
Start: 2023-04-04 | End: 2023-09-26 | Stop reason: SDUPTHER

## 2023-04-04 RX ORDER — TRIAMCINOLONE ACETONIDE 1 MG/G
CREAM TOPICAL 2 TIMES DAILY
Qty: 15 G | Refills: 1 | Status: SHIPPED | OUTPATIENT
Start: 2023-04-04

## 2023-04-04 NOTE — PROGRESS NOTES
Patient ID: [id number]     Chief Complaint: Follow-up (States been having some dizziness), Knee Pain, and Dizziness    HPI:     Leatha Martinez is a 68 y.o. female with diagnosis of HTN, HLD, Hx of DVT, Hx of COVID-19, Obesity, Frequent UTIs. Patient seen in clinic today for follow up on TSH. Patient last seen in clinic on 02/01/2023.   Patient's daughter present during visit. At previous appt, TSH noted to be decreased. T3 and T4 WNL. Patient denies anxiety, SOB, palpitations, insomnia. Repeat TSH still decreased.   Patient seen by Urology (see below) on 01/04/2023, prescribed Keflex for recurrent UTIs, started with a rash about a week after, patient stopped taking antibiotic and rash improved. Patient allergic to PCN. Patient treated with keflex previously without complications.   Patient states SOB started after diagnosis of COVID. Currently prescribed Albuterol nebulizer, doing well.   Patient seen in clinic on 03/04/2022 for left knee pain. Left knee X-ray indicated degenerative changes. Patient referred to Orthopedic Clinic, had appointment on 03/15/2022 but was a no show. Patient's daughter states she would like to be referred back once she has her Urology procedure. Patient's daughter states she will call when ready for new referral.   Patient denies any other acute complaints.      Patient followed by Cardiology Clinic. Last appointment on 01/19/2023. Hx of HTN, Pulmonary HTN, unprovoked DVT (2014) on Xarelto, and obesity. No major changes. Remains chronically SOB after COVID. Denies chest pain. No leg swelling today. Has some right knee pain. Ha sbeen taking her meds. Says her BP is high today due to walking upstairs and being SOB. It is usually normal. Complaint with all meds. Denies any evidence of bleeding. Leatha Martinez is a 67 y.o. female with a PMH unprovoked subsegmental PE (2013), severe COVID, HTN, HLD, venous insufficiency, obesity here as a followup. From a cardiac perspective she is doing well.  She tells me when she had her PE she had just been on a flight from [place]. She also states she has a family history of blood clots and lupus. She apparently had testing done for hypercoagulability previously. Continue xarelto 20mg daily for prior PE. Would evaluate causes of microcytic anemia. Continue amlodipine 10mg daily and lasix 20mg daily. Continue simvastatin 20mg daily. 1 year followup.     Patient followed by Urology Clinic. Last appointment on 2023. Leatha Martinez is a 67 y.o. female seen in consultation for recurrent urinary tract infections. She had a urinary tract infection in October, November, and 2022.  These infections were all asymptomatic.  Two of the urine cultures grew out greater than 100,000 E coli and the third was <10,000 E coli.  She had a CT scan on 2022 which was negative.  She denies any urinary symptoms either with or between the infections.  She denies incontinence.  She does state that she tends to hold her urine.  She has no past history of recurrent urinary tract infections or other urologic history. With three infections back to back and then being asymptomatic I will place her on suppression with cephalexin 250 mg a day. Cystoscopy. Patient has follow up appointment scheduled for 2023.     Review of patient's allergies indicates:   Allergen Reactions    Penicillins Hives    Aspirin Rash    Keflex [cephalexin] Rash    Tramadol Rash     Breast Cancer Screening: MMG negative on 2022, reordered  Cervical Cancer Screening: deferred due to age  Colorectal Cancer Screening:  Colonoscopy on 2022, recommend repeat in 3 years  Diabetic Eye Exam: N/A  Diabetic Foot Exam: N/A  Lung Cancer Screening: N/A  Prostate Cancer Screening: N/A  AAA Screening: deferred due to age  Osteoporosis Screenin2020, osteopenia  Medicare Wellness: ordered  Immunizations:   Immunization History   Administered Date(s) Administered    Influenza (FLUAD) -  Quadrivalent - Adjuvanted - PF *Preferred* (65+) 10/04/2022    Tdap 05/22/2017     Past Surgical History:   Procedure Laterality Date    COLONOSCOPY  04/14/2022     family history includes Diabetes in her father; Heart attack in her father and mother; Thyroid disease in her father and mother.    Social History     Socioeconomic History    Marital status: Single    Number of children: 3   Tobacco Use    Smoking status: Never     Passive exposure: Current    Smokeless tobacco: Never   Substance and Sexual Activity    Alcohol use: Not Currently    Drug use: Never    Sexual activity: Not Currently     Partners: Male     Social Determinants of Health     Financial Resource Strain: Medium Risk    Difficulty of Paying Living Expenses: Somewhat hard   Food Insecurity: No Food Insecurity    Worried About Running Out of Food in the Last Year: Never true    Ran Out of Food in the Last Year: Never true   Transportation Needs: Unmet Transportation Needs    Lack of Transportation (Medical): Yes    Lack of Transportation (Non-Medical): Yes   Physical Activity: Unknown    Days of Exercise per Week: 3 days   Stress: Stress Concern Present    Feeling of Stress : To some extent   Social Connections: Unknown    Frequency of Communication with Friends and Family: Three times a week    Frequency of Social Gatherings with Friends and Family: Three times a week    Attends [employer] Services: 1 to 4 times per year    Active Member of Clubs or Organizations: No    Attends Club or Organization Meetings: Never   Housing Stability: Low Risk     Unable to Pay for Housing in the Last Year: No    Number of Places Lived in the Last Year: 1    Unstable Housing in the Last Year: No     Current Outpatient Medications   Medication Instructions    albuterol (VENTOLIN HFA) 90 mcg/actuation inhaler 2 puffs, Inhalation, Every 6 hours PRN, Rescue    amLODIPine (NORVASC) 10 mg, Oral, Daily    ciprofloxacin HCl (CIPRO) 500 mg, Oral, 2 times daily     "ergocalciferol (ERGOCALCIFEROL) 50,000 Units, Oral, Every 7 days    furosemide (LASIX) 20 mg, Oral, Daily    pantoprazole (PROTONIX) 40 mg, Oral, Daily    rivaroxaban (XARELTO) 20 mg, Oral, With dinner    simvastatin (ZOCOR) 20 mg, Oral, Nightly    triamcinolone acetonide 0.1% (KENALOG) 0.1 % cream Topical (Top), 2 times daily       Subjective:     Review of Systems   Constitutional: Negative.    HENT: Negative.     Eyes: Negative.    Respiratory: Negative.     Cardiovascular: Negative.    Gastrointestinal: Negative.    Endocrine: Negative.    Genitourinary: Negative.    Musculoskeletal:  Positive for arthralgias.   Skin: Negative.    Allergic/Immunologic: Negative.    Neurological: Negative.    Hematological: Negative.    Psychiatric/Behavioral: Negative.       Objective:     Visit Vitals  BP (!) 113/58 (BP Location: Left arm, Patient Position: Sitting, BP Method: Large (Automatic))   Pulse 73   Temp 98.1 °F (36.7 °C) (Oral)   Resp 18   Ht 5' 5.98" (1.676 m)   Wt 108.9 kg (240 lb)   BMI 38.76 kg/m²       Physical Exam  Vitals reviewed.   Constitutional:       Appearance: Normal appearance. She is obese.   HENT:      Head: Normocephalic and atraumatic.      Mouth/Throat:      Mouth: Mucous membranes are moist.      Pharynx: Oropharynx is clear.   Eyes:      Extraocular Movements: Extraocular movements intact.      Conjunctiva/sclera: Conjunctivae normal.      Pupils: Pupils are equal, round, and reactive to light.   Cardiovascular:      Rate and Rhythm: Normal rate and regular rhythm.      Heart sounds: Normal heart sounds.   Pulmonary:      Effort: Pulmonary effort is normal.      Breath sounds: Normal breath sounds.   Abdominal:      General: Bowel sounds are normal.   Musculoskeletal:         General: Normal range of motion.      Cervical back: Normal range of motion.   Skin:     General: Skin is warm and dry.   Neurological:      Mental Status: She is alert and oriented to person, place, and time.   Psychiatric: "         Mood and Affect: Mood normal.         Behavior: Behavior normal.       Labs Reviewed:     Hematology:  Lab Results   Component Value Date    WBC 8.6 03/31/2023    HGB 10.4 (L) 03/31/2023    HCT 34.0 (L) 03/31/2023     03/31/2023     Chemistry:  Lab Results   Component Value Date     03/31/2023    K 4.4 03/31/2023    CHLORIDE 106 03/31/2023    BUN 17.0 03/31/2023    CREATININE 0.84 03/31/2023    EGFRNORACEVR >60 03/31/2023    GLUCOSE 112 03/31/2023    CALCIUM 9.1 03/31/2023    ALKPHOS 147 03/31/2023    LABPROT 8.1 (H) 03/31/2023    ALBUMIN 3.2 (L) 03/31/2023    BILIDIR 0.2 03/15/2022    IBILI 0.10 03/15/2022    AST 19 03/31/2023    ALT 15 03/31/2023    MG 1.70 08/28/2021    PHOS 3.5 08/22/2021    PYVTEUGP16SB 12.1 (L) 10/04/2022     Lab Results   Component Value Date    HGBA1C 6.3 10/04/2022     Lipid Panel:  Lab Results   Component Value Date    CHOL 137 03/31/2023    HDL 32 (L) 03/31/2023    LDL 86.00 03/31/2023    TRIG 93 03/31/2023    TOTALCHOLEST 4 03/31/2023     Thyroid:  Lab Results   Component Value Date    TSH 0.122 (L) 03/31/2023    T3FREE 3.28 03/31/2023     Urine:  Lab Results   Component Value Date    COLORUA Light-Yellow 03/31/2023    APPEARANCEUA Clear 03/31/2023    SGUA 1.012 03/31/2023    PHUA 5.5 03/31/2023    PROTEINUA Negative 03/31/2023    GLUCOSEUA Normal 03/31/2023    KETONESUA Negative 03/31/2023    BLOODUA Negative 03/31/2023    NITRITESUA Negative 03/31/2023    LEUKOCYTESUR 250 (A) 03/31/2023    RBCUA 0-5 03/31/2023    WBCUA 11-20 (A) 03/31/2023    BACTERIA Occ (A) 03/31/2023    SQEPUA Few (A) 03/31/2023    HYALINECASTS 0-2 (A) 03/31/2023    CREATRANDUR 87.5 03/31/2023        Assessment:       ICD-10-CM ICD-9-CM   1. Abnormal TSH  R79.89 790.6   2. Acute cystitis without hematuria  N30.00 595.0   3. Primary hypertension  I10 401.9   4. Other hyperlipidemia  E78.49 272.4   5. Gastro-esophageal reflux disease without esophagitis  K21.9 530.81   6. Class 3 severe obesity  due to excess calories with serious comorbidity and body mass index (BMI) of 45.0 to 49.9 in adult  E66.01 278.01    Z68.42 V85.42   7. Encounter for screening mammogram for malignant neoplasm of breast  Z12.31 V76.12        Plan:     1. Abnormal TSH  TSH: 0.122  T3: 3.28  T4: 0.91  - US Thyroid; Future    2. Acute cystitis without hematuria  Start Ciprofloxacin 500 mg bid x7 days  Urology appt 05/18/2023    3. Primary hypertension  Vitals:    04/04/23 0743   BP: (!) 113/58   Pulse: 73   Resp: 18   Temp: 98.1 °F (36.7 °C)      Urine Creatinine   Date Value Ref Range Status   03/31/2023 87.5 47.0 - 110.0 mg/dL Final   Urine Microalbumin: 38.5  Results for orders placed or performed in visit on 01/19/23   IN OFFICE EKG 12-LEAD (to [place])    Collection Time: 01/19/23 11:19 AM    Narrative    Test Reason : I10,R06.09,U09.9,E66.01,Z68.42,    Vent. Rate : 075 BPM     Atrial Rate : 075 BPM     P-R Int : 140 ms          QRS Dur : 072 ms      QT Int : 392 ms       P-R-T Axes : 061 -07 -06 degrees     QTc Int : 437 ms    Normal sinus rhythm  Minimal voltage criteria for LVH, may be normal variant  Cannot rule out Anterior infarct ,age undetermined  T wave abnormality, consider lateral ischemia  Abnormal ECG  No previous ECGs available  Confirmed by Deuce Sy MD ([id number]) on 1/19/2023 2:01:49 PM    Referred By:             Confirmed By:Deuce Sy MD   Continue Amlodipine 10 mg daily, Lasix 20 mg daily  DASH diet  Encouraged home blood pressure monitoring    4. Other hyperlipidemia  Continue Simvastatin 10 mg daily  Weight loss encouraged  Low fat/high fiber diet  Increase physical activity  Cholesterol Total   Date Value Ref Range Status   03/31/2023 137 <=200 mg/dL Final     HDL Cholesterol   Date Value Ref Range Status   03/31/2023 32 (L) 35 - 60 mg/dL Final     Triglyceride   Date Value Ref Range Status   03/31/2023 93 37 - 140 mg/dL Final     LDL Cholesterol   Date Value Ref Range Status   03/31/2023 86.00 50.00 -  140.00 mg/dL Final     5. Gastro-esophageal reflux disease without esophagitis  Continue Pantoprazole 40 mg daily  Avoid spicy foods  Remain in an upright position for at least 30 minutes after consuming meals      6. Class 3 severe obesity due to excess calories with serious comorbidity and body mass index (BMI) of 45.0 to 49.9 in adult  Body mass index is 38.76 kg/m².  Thyroid Stimulating Hormone   Date Value Ref Range Status   03/31/2023 0.122 (L) 0.350 - 4.940 uIU/mL Final     Vit D 25 OH   Date Value Ref Range Status   10/04/2022 12.1 (L) 30.0 - 80.0 ng/mL Final     Hemoglobin A1c   Date Value Ref Range Status   10/04/2022 6.3 <=7.0 % Final   Sleep Study: none noted  Weight Loss Encouraged  Increase Physical Activity    7. Encounter for screening mammogram for malignant neoplasm of breast  - Mammo Digital Screening Bilat w/ [facility]; Future      Follow up in about 3 months (around 7/4/2023) for Virtual Visit, Thyroid US. In addition to their scheduled follow up, the patient has also been instructed to follow up on as needed basis.     Tiffany Harris, RENU

## 2023-04-18 ENCOUNTER — PATIENT MESSAGE (OUTPATIENT)
Dept: INTERNAL MEDICINE | Facility: CLINIC | Age: 68
End: 2023-04-18
Payer: MEDICARE

## 2023-04-18 ENCOUNTER — HOSPITAL ENCOUNTER (OUTPATIENT)
Dept: RADIOLOGY | Facility: HOSPITAL | Age: 68
Discharge: HOME | End: 2023-04-18
Attending: NURSE PRACTITIONER
Payer: MEDICARE

## 2023-04-18 DIAGNOSIS — R79.89 ABNORMAL TSH: ICD-10-CM

## 2023-04-18 DIAGNOSIS — Z12.31 ENCOUNTER FOR SCREENING MAMMOGRAM FOR MALIGNANT NEOPLASM OF BREAST: ICD-10-CM

## 2023-04-18 PROCEDURE — 76536 US EXAM OF HEAD AND NECK: CPT | Mod: TC

## 2023-04-21 ENCOUNTER — TELEPHONE (OUTPATIENT)
Dept: INTERNAL MEDICINE | Facility: CLINIC | Age: 68
End: 2023-04-21
Payer: MEDICARE

## 2023-04-21 DIAGNOSIS — E04.1 THYROID NODULE: Primary | ICD-10-CM

## 2023-04-21 NOTE — TELEPHONE ENCOUNTER
----- Message from RENU Tubbs sent at 4/21/2023 12:13 PM CDT -----  Please notify patient/daughter that nodules noted on Thyroid US. Referral ordered for ENT.

## 2023-04-21 NOTE — TELEPHONE ENCOUNTER
Contacted laura Rodriguez  ( daughter) of thyroid ultrasound results. She voiced understanding. She will inform her mother.

## 2023-04-22 ENCOUNTER — HOSPITAL ENCOUNTER (EMERGENCY)
Facility: HOSPITAL | Age: 68
Discharge: HOME | End: 2023-04-22
Attending: STUDENT IN AN ORGANIZED HEALTH CARE EDUCATION/TRAINING PROGRAM
Payer: MEDICARE

## 2023-04-22 VITALS
HEIGHT: 66 IN | TEMPERATURE: 98 F | OXYGEN SATURATION: 97 % | BODY MASS INDEX: 45.35 KG/M2 | WEIGHT: 282.19 LBS | DIASTOLIC BLOOD PRESSURE: 86 MMHG | RESPIRATION RATE: 20 BRPM | SYSTOLIC BLOOD PRESSURE: 154 MMHG | HEART RATE: 75 BPM

## 2023-04-22 DIAGNOSIS — M25.512 ACUTE PAIN OF LEFT SHOULDER: Primary | ICD-10-CM

## 2023-04-22 PROCEDURE — 63600175 PHARM REV CODE 636 W HCPCS: Performed by: STUDENT IN AN ORGANIZED HEALTH CARE EDUCATION/TRAINING PROGRAM

## 2023-04-22 PROCEDURE — 99284 EMERGENCY DEPT VISIT MOD MDM: CPT

## 2023-04-22 PROCEDURE — 96372 THER/PROPH/DIAG INJ SC/IM: CPT | Performed by: STUDENT IN AN ORGANIZED HEALTH CARE EDUCATION/TRAINING PROGRAM

## 2023-04-22 RX ORDER — ORPHENADRINE CITRATE 30 MG/ML
60 INJECTION INTRAMUSCULAR; INTRAVENOUS
Status: COMPLETED | OUTPATIENT
Start: 2023-04-22 | End: 2023-04-22

## 2023-04-22 RX ORDER — KETOROLAC TROMETHAMINE 30 MG/ML
60 INJECTION, SOLUTION INTRAMUSCULAR; INTRAVENOUS
Status: COMPLETED | OUTPATIENT
Start: 2023-04-22 | End: 2023-04-22

## 2023-04-22 RX ORDER — HYDROCODONE BITARTRATE AND ACETAMINOPHEN 5; 325 MG/1; MG/1
1 TABLET ORAL EVERY 6 HOURS PRN
Qty: 12 TABLET | Refills: 0 | OUTPATIENT
Start: 2023-04-22 | End: 2023-07-29

## 2023-04-22 RX ADMIN — KETOROLAC TROMETHAMINE 60 MG: 30 INJECTION, SOLUTION INTRAMUSCULAR at 06:04

## 2023-04-22 RX ADMIN — ORPHENADRINE CITRATE 60 MG: 30 INJECTION INTRAMUSCULAR; INTRAVENOUS at 06:04

## 2023-04-22 NOTE — ED PROVIDER NOTES
Encounter Date: 4/22/2023    SCRIBE #1 NOTE: I, Reinaldo Bradley, am scribing for, and in the presence of,  Devante Beltre IV, MD. I have scribed the following portions of the note - Other sections scribed: HPI,ROS,PE.     History     Chief Complaint   Patient presents with    Shoulder Pain     Complaint of left shoulder pain, worse with movement.   States symptom started yesterday.       67 y/o female with PMHx of HLD, and HTN presents to ED c/o L. Shoulder pain onset yesterday. Pt states pain is worse with movement. Pt states it might be from previous fall but is unsure. Pt states she has not overused L. Arm. Pt denies any chest pain, SOB, or cough.     The history is provided by the patient. No  was used.   Review of patient's allergies indicates:   Allergen Reactions    Penicillins Hives    Aspirin Rash    Keflex [cephalexin] Rash    Tramadol Rash     Past Medical History:   Diagnosis Date    Hyperlipidemia     Hypertension     Personal history of colonic polyps 04/14/2022     Past Surgical History:   Procedure Laterality Date    COLONOSCOPY  04/14/2022     Family History   Problem Relation Age of Onset    Heart attack Mother     Thyroid disease Mother     Diabetes Father     Thyroid disease Father     Heart attack Father      Social History     Tobacco Use    Smoking status: Never     Passive exposure: Current    Smokeless tobacco: Never   Substance Use Topics    Alcohol use: Not Currently    Drug use: Never     Review of Systems   Constitutional:  Negative for chills and fever.   HENT:  Negative for congestion, rhinorrhea and sore throat.    Eyes:  Negative for visual disturbance.   Respiratory:  Negative for cough and shortness of breath.    Cardiovascular:  Negative for chest pain and leg swelling.   Gastrointestinal:  Negative for abdominal pain, nausea and vomiting.   Genitourinary:  Negative for dysuria, hematuria, vaginal bleeding and vaginal discharge.   Musculoskeletal:  Negative for  joint swelling.        L. Shoulder pain.   Skin:  Negative for rash.   Neurological:  Negative for weakness.   Psychiatric/Behavioral:  Negative for confusion.      Physical Exam     Initial Vitals [04/22/23 0358]   BP Pulse Resp Temp SpO2   (!) 153/53 75 20 98.1 °F (36.7 °C) 97 %      MAP       --         Physical Exam    Nursing note and vitals reviewed.  Constitutional: She is not diaphoretic. No distress.   Cardiovascular:  Normal rate, regular rhythm and normal heart sounds.           No murmur heard.  2+ distal radial pulse.    Pulmonary/Chest: Breath sounds normal. No respiratory distress. She has no wheezes. She has no rales.   Abdominal: Abdomen is soft. She exhibits no distension. There is no abdominal tenderness.   Musculoskeletal:         General: Tenderness present.      Comments: L  shoulder tenderness upon palpation centered around AC joint, with limited ROM 2/2 pain. No deformities or swelling to area.      Neurological: She is alert.   Psychiatric: She has a normal mood and affect.       ED Course   Procedures  Labs Reviewed - No data to display       Imaging Results              X-Ray Shoulder 2 or More Views Left (Final result)  Result time 04/22/23 11:28:39      Final result by Gasper Adames MD (04/22/23 11:28:39)                   Impression:      1. No acute displaced fracture or dislocation.      Electronically signed by: Gasper Adames MD  Date:    04/22/2023  Time:    11:28               Narrative:    EXAMINATION:  XR SHOULDER COMPLETE 2 OR MORE VIEWS LEFT    CLINICAL HISTORY:  Left shoulder pain.    TECHNIQUE:  Three views of the left shoulder.    COMPARISON:  02/07/2020    FINDINGS:  No acute displaced fracture, subluxation, or dislocation is identified.  AC and glenohumeral joint osteoarthritic changes noted.  Chronic ossification noted superior to the AC joint.  No radiopaque foreign body.                                       Medications   ketorolac injection 60 mg (60 mg Intramuscular  Given 4/22/23 0627)   orphenadrine injection 60 mg (60 mg Intramuscular Given 4/22/23 0627)              Scribe Attestation:   Scribe #1: I performed the above scribed service and the documentation accurately describes the services I performed. I attest to the accuracy of the note.    Attending Attestation:           Physician Attestation for Scribe:  Physician Attestation Statement for Scribe #1: Devante MICHELLE IV, MD, reviewed documentation, as scribed by Reinaldo Bradley in my presence, and it is both accurate and complete.       Medical Decision Making  Problems Addressed:  Acute pain of left shoulder: acute illness or injury that poses a threat to life or bodily functions      ED assessment:    67 yo with L shoulder pain, musculoskeletal in nature  No other associated symptoms  Reproducible on exam as above  XR unremarkable  Treat with toradol/norflex in ED with improvement  D/c with pain meds and PCP follow up     Differential diagnosis (including but not limited to):   Sprain, strain, dislocation, shoulder separation, fracture     ED management:   IM toradol, IM norflex    My independent radiology interpretation:   XR L shoulder - no obvious fx/dislocation       Amount and/or Complexity of Data Reviewed  Independent historian: none  External data reviewed: seen in ED for fall a month ago, no injuries on workup at that time  Risk and benefits of testing: discussed   Labs: ordered and reviewed  Radiology: ordered and independent interpretation performed (see above or ED course)    Risk  Prescription drug management   Shared decision making     Critical Care  none    Devante MICHELLE MD personally performed the history, PE, MDM, and procedures as documented above and agree with the scribe's documentation.          ED Course as of 04/22/23 1258   Sat Apr 22, 2023   0727 Pain improved, will discharge with PCP followup.  [AC]      ED Course User Index  [AC] Devante Beltre IV, MD                 Clinical Impression:    Final diagnoses:  [M25.512] Acute pain of left shoulder (Primary)        ED Disposition Condition    Discharge Stable          ED Prescriptions       Medication Sig Dispense Start Date End Date Auth. Provider    HYDROcodone-acetaminophen (NORCO) 5-325 mg per tablet Take 1 tablet by mouth every 6 (six) hours as needed for Pain. 12 tablet 4/22/2023 -- Devante Beltre IV, MD          Follow-up Information       Follow up With Specialties Details Why Contact Info    [facility] - Emergency Dept Emergency Medicine Go to  If symptoms worsen [address] [facility] [id number] [phone]    Primary care physician  Schedule an appointment as soon as possible for a visit   Follow up with you primary care physician.   If you do not have a primary care physician call [phone] to schedule an appointment.             Devante Beltre IV, MD  04/22/23 3670

## 2023-04-22 NOTE — DISCHARGE INSTRUCTIONS
Thanks for letting use take care of you today! It is our goal to give you courteous care and to keep you comfortable and informed. If you have any questions before you leave I will be happy to try and answer them.     Advice after your visit:  Your visit in the emergency department is NOT definitive care - please follow-up with your primary care doctor and/or specialist within 1-2 days. If you do not have a primary care physician call [phone] to schedule an appointment. Please return if you have any worsening in your condition or if you have any other concerns.    Return to the emergency department if any worsening symptoms including fever, chest pain, difficulty breathing, weakness, numbness, tingling, nausea, vomiting, inability to eat, drink or take your medication, or any other new symptoms or concerns arise.      Please signup for MyChart as noted below in your paperwork to review all labwork, imaging results, and any other incidental findings from today's visit.     If you had radiology exams like an XRAY or CT in the emergency Department the interpreation on them may be preliminary - there may be less time sensitive findings on the reports please obtain these reports within 24 hours from the hospital or by using your out on your mobile phone to access records.  Bring these to your primary care doctor and/or specialist for further review of incidental findings.    Please review any LAB WORK from your visit today with your primary care physician.    If you were prescribed OPIATE PAIN MEDICATION - please understand of these medications can be addictive, you may fill less of the prescription was written for, you do not have to take the full prescription.  You may discard what you do not use.  Please seek help if you feel you are having problems with addiction.  Do not drive or operate heavy machinery if you are taking sedating medications.  Do not mix these medications with alcohol.      If you had a SPLINT  placed in the emergency department if you have severe pain numbness tingling or discoloration of year digits please remove the splint and return to the emergency department for further evaluation as this may represent a sign of compromise to the nerves or blood vessels due to swelling.    If you had SUTURES in the emergency department please have them removed in the prescribed time frame typically within 7-14 days.  You may shower but please do not bathe or swim.  Keep the wounds clean and dry and covered with a clean dressing.  Please return if he have any signs of infection like redness or drainage or pain at the suture site.    Please take the full course of  any ANTIBIOTICS you were prescribed - incomplete courses of antibiotics can cause resistance to antibiotics in the future which will make it difficult to treat any infections you may have.

## 2023-04-24 PROBLEM — I26.93 SINGLE SUBSEGMENTAL PULMONARY EMBOLISM WITHOUT ACUTE COR PULMONALE: Status: RESOLVED | Noted: 2023-01-19 | Resolved: 2023-04-24

## 2023-05-02 ENCOUNTER — HOSPITAL ENCOUNTER (OUTPATIENT)
Dept: RADIOLOGY | Facility: HOSPITAL | Age: 68
Discharge: HOME | End: 2023-05-02
Attending: NURSE PRACTITIONER
Payer: MEDICARE

## 2023-05-02 DIAGNOSIS — Z12.31 ENCOUNTER FOR SCREENING MAMMOGRAM FOR MALIGNANT NEOPLASM OF BREAST: ICD-10-CM

## 2023-05-02 DIAGNOSIS — Z12.31 SCREENING MAMMOGRAM, ENCOUNTER FOR: ICD-10-CM

## 2023-05-02 PROCEDURE — 77067 SCR MAMMO BI INCL CAD: CPT | Mod: 26,,, | Performed by: RADIOLOGY

## 2023-05-02 PROCEDURE — 77063 BREAST TOMOSYNTHESIS BI: CPT | Mod: 26,,, | Performed by: RADIOLOGY

## 2023-05-02 PROCEDURE — 77067 MAMMO DIGITAL SCREENING BILAT WITH TOMO: ICD-10-PCS | Mod: 26,,, | Performed by: RADIOLOGY

## 2023-05-02 PROCEDURE — 77063 MAMMO DIGITAL SCREENING BILAT WITH TOMO: ICD-10-PCS | Mod: 26,,, | Performed by: RADIOLOGY

## 2023-05-02 PROCEDURE — 77067 SCR MAMMO BI INCL CAD: CPT | Mod: TC

## 2023-05-18 ENCOUNTER — PROCEDURE VISIT (OUTPATIENT)
Dept: UROLOGY | Facility: CLINIC | Age: 68
End: 2023-05-18
Payer: MEDICARE

## 2023-05-18 VITALS
WEIGHT: 273 LBS | HEIGHT: 66 IN | TEMPERATURE: 98 F | OXYGEN SATURATION: 99 % | RESPIRATION RATE: 20 BRPM | SYSTOLIC BLOOD PRESSURE: 149 MMHG | DIASTOLIC BLOOD PRESSURE: 72 MMHG | BODY MASS INDEX: 43.87 KG/M2 | HEART RATE: 72 BPM

## 2023-05-18 DIAGNOSIS — N30.90 RECURRENT CYSTITIS: Primary | ICD-10-CM

## 2023-05-18 DIAGNOSIS — N30.80 CYSTITIS CYSTICA: ICD-10-CM

## 2023-05-18 DIAGNOSIS — N30.00 ACUTE CYSTITIS WITHOUT HEMATURIA: ICD-10-CM

## 2023-05-18 LAB
BILIRUB SERPL-MCNC: NEGATIVE MG/DL
BLOOD URINE, POC: NORMAL
COLOR, POC UA: YELLOW
GLUCOSE UR QL STRIP: NEGATIVE
KETONES UR QL STRIP: NEGATIVE
LEUKOCYTE ESTERASE URINE, POC: NORMAL
NITRITE, POC UA: POSITIVE
PH, POC UA: 6
PROTEIN, POC: NEGATIVE
SPECIFIC GRAVITY, POC UA: 1.01
UROBILINOGEN, POC UA: 0.2

## 2023-05-18 PROCEDURE — 52000 PR CYSTOURETHROSCOPY: ICD-10-PCS | Mod: S$PBB,,, | Performed by: UROLOGY

## 2023-05-18 PROCEDURE — 87077 CULTURE AEROBIC IDENTIFY: CPT | Performed by: UROLOGY

## 2023-05-18 PROCEDURE — 52000 CYSTOURETHROSCOPY: CPT | Mod: PBBFAC | Performed by: UROLOGY

## 2023-05-18 PROCEDURE — 52000 CYSTOURETHROSCOPY: CPT | Mod: S$PBB,,, | Performed by: UROLOGY

## 2023-05-18 PROCEDURE — 99499 NO LOS: ICD-10-PCS | Mod: S$PBB,,, | Performed by: UROLOGY

## 2023-05-18 PROCEDURE — 81001 URINALYSIS AUTO W/SCOPE: CPT | Mod: PBBFAC | Performed by: UROLOGY

## 2023-05-18 PROCEDURE — 87088 URINE BACTERIA CULTURE: CPT | Performed by: UROLOGY

## 2023-05-18 PROCEDURE — 99499 UNLISTED E&M SERVICE: CPT | Mod: S$PBB,,, | Performed by: UROLOGY

## 2023-05-18 RX ORDER — CIPROFLOXACIN 500 MG/1
500 TABLET ORAL
Status: COMPLETED | OUTPATIENT
Start: 2023-05-18 | End: 2023-05-18

## 2023-05-18 RX ORDER — NITROFURANTOIN MACROCRYSTALS 50 MG/1
50 CAPSULE ORAL DAILY
Qty: 90 CAPSULE | Refills: 3 | Status: SHIPPED | OUTPATIENT
Start: 2023-05-18 | End: 2023-08-24 | Stop reason: SDUPTHER

## 2023-05-18 RX ORDER — LIDOCAINE HYDROCHLORIDE 20 MG/ML
JELLY TOPICAL
Status: COMPLETED | OUTPATIENT
Start: 2023-05-18 | End: 2023-05-18

## 2023-05-18 RX ADMIN — LIDOCAINE HYDROCHLORIDE: 20 JELLY TOPICAL at 08:05

## 2023-05-18 RX ADMIN — CIPROFLOXACIN 500 MG: 500 TABLET ORAL at 08:05

## 2023-05-18 NOTE — PROCEDURES
CC:  Cystoscopy    HPI:  Leatha Martinez is a 68 y.o. female here for cystoscopy for recurrent urinary tract infections.  At the last visit three months ago she was placed on cephalexin suppression however she stopped taking that due to a rash.  She is allergic to penicillin.  She was treated for a urinary tract infection in early April with Cipro.  She is currently asymptomatic.    Lab Results:  Urinalysis today:  Color, UA Yellow    Spec Grav UA 1.015    pH, UA 6.0    WBC, UA Small    Nitrite, UA Positive    Protein, POC Negative    Glucose, UA Negative    Ketones, UA Negative    Urobilinogen, UA 0.2    Bilirubin, POC Negative    Blood, UA Trace-intact         Microscopic:  3-5 epithelial cells                        Few bacteria                        3-5 WBC      Specimen Collected: 05/18/23 08:03             Imaging:  CT - 20 November 2022:  Negative      ROS:  All systems reviewed and are negative except as documented in HPI and/or Assessment and Plan.     Patient Active Problem List:     Patient Active Problem List   Diagnosis    Hypertension    Gastro-esophageal reflux disease without esophagitis    Other hyperlipidemia    Class 3 severe obesity due to excess calories with serious comorbidity and body mass index (BMI) of 45.0 to 49.9 in adult    Personal history of other venous thrombosis and embolism    Personal history of COVID-19    Abnormal TSH    Leukocytosis    Post-COVID chronic dyspnea        Past Medical History:  Past Medical History:   Diagnosis Date    Hyperlipidemia     Hypertension     Personal history of colonic polyps 04/14/2022        Past Surgical History:  Past Surgical History:   Procedure Laterality Date    COLONOSCOPY  04/14/2022        Family History:  Family History   Problem Relation Age of Onset    Heart attack Mother     Thyroid disease Mother     Diabetes Father     Thyroid disease Father     Heart attack Father         Social History:  Social History     Socioeconomic History     Marital status: Single    Number of children: 3   Tobacco Use    Smoking status: Never     Passive exposure: Current    Smokeless tobacco: Never   Substance and Sexual Activity    Alcohol use: Not Currently    Drug use: Never    Sexual activity: Not Currently     Partners: Male     Social Determinants of Health     Financial Resource Strain: Medium Risk    Difficulty of Paying Living Expenses: Somewhat hard   Food Insecurity: No Food Insecurity    Worried About Running Out of Food in the Last Year: Never true    Ran Out of Food in the Last Year: Never true   Transportation Needs: Unmet Transportation Needs    Lack of Transportation (Medical): Yes    Lack of Transportation (Non-Medical): Yes   Physical Activity: Unknown    Days of Exercise per Week: 3 days   Stress: Stress Concern Present    Feeling of Stress : To some extent   Social Connections: Unknown    Frequency of Communication with Friends and Family: Three times a week    Frequency of Social Gatherings with Friends and Family: Three times a week    Attends [employer] Services: 1 to 4 times per year    Active Member of Clubs or Organizations: No    Attends Club or Organization Meetings: Never   Housing Stability: Low Risk     Unable to Pay for Housing in the Last Year: No    Number of Places Lived in the Last Year: 1    Unstable Housing in the Last Year: No        Allergies:  Review of patient's allergies indicates:   Allergen Reactions    Penicillins Hives    Aspirin Rash    Keflex [cephalexin] Rash    Tramadol Rash        Objective:  Vitals:    05/18/23 0736   BP: (!) 149/72   Pulse: 72   Resp: 20   Temp: 97.9 °F (36.6 °C)     General:  Well developed, well nourished adult female in no acute distress  Abdomen: Soft, nontender, no masses  Extremities:  No clubbing, cyanosis, or edema  Neurologic:  Grossly intact  Musculoskeletal:  Normal tone  :  External genitalia is normal without lesions.  Vagina is normal.      Cystoscopy:        - Urethral meatus:  No  strictures        - Urethra:  Normal without strictures or lesions however the urethra is retracted into the vagina.        - Bladder neck:  Normal        - Bladder:  Cystitis cystica is present.  No concerning bladder mucosal lesions are seen.          - Ureteral orifices:  On the trigone with clear efflux bilaterally    The patient tolerated the procedure well without complications.  She was given Cipro 500mg, one tablet in the clinic.   The urethra was anesthetized with 2% Lidocaine Jelly, Urojet.      Assessment:  1. Recurrent cystitis  - POCT URINE DIPSTICK WITH MICROSCOPE, AUTOMATED  - nitrofurantoin (MACRODANTIN) 50 MG capsule; Take 1 capsule (50 mg total) by mouth once daily.  Dispense: 90 capsule; Refill: 3    2. Cystitis cystica    3. Acute cystitis without hematuria  - Urine culture     Plan:  Urine culture sent today.  I placed the patient on Macrodantin suppression.  I am sure the urethral retraction into the vagina is a factor in these recurrent urinary tract infections.  This is likely an effect of the recent infection she had.  No concerning lesions were seen on cystoscopy.  We will await results of urine culture for treating.    Follow-up:    Three months.

## 2023-05-18 NOTE — PROGRESS NOTES
Seen by Dr. Greene.  Consent signed.  Time out performed.  Lidocaine urojet applied. Cipro 500mg po given.  Assisted with procedure.  Tolerated well. Urine sent to lab for culture.  RTC 3 months.

## 2023-05-20 LAB — BACTERIA UR CULT: ABNORMAL

## 2023-05-22 ENCOUNTER — TELEPHONE (OUTPATIENT)
Dept: UROLOGY | Facility: CLINIC | Age: 68
End: 2023-05-22
Payer: MEDICARE

## 2023-05-22 RX ORDER — CIPROFLOXACIN 500 MG/1
500 TABLET ORAL EVERY 12 HOURS
Qty: 14 TABLET | Refills: 0 | Status: SHIPPED | OUTPATIENT
Start: 2023-05-22 | End: 2023-09-26 | Stop reason: ALTCHOICE

## 2023-05-22 NOTE — TELEPHONE ENCOUNTER
Called to inform pt daughter of culture results and medication that was sent to pharmacy. Instructed pt daughter instructions about the suppression medication and to stop taking it while on the new medication of Cipro and resume taking it the day after finishing. Pt daughter voiced understanding.

## 2023-05-22 NOTE — TELEPHONE ENCOUNTER
Urine culture grew out E coli.  I have sent Cipro 500 mg for seven days to [facility].   She is currently on Macrodantin for suppression so have her stop that while she is on Cipro and resume taking it the day after finishing.  Please inform the patient of these results, instructions, and the prescription.

## 2023-07-05 ENCOUNTER — TELEPHONE (OUTPATIENT)
Dept: INTERNAL MEDICINE | Facility: CLINIC | Age: 68
End: 2023-07-05
Payer: MEDICARE

## 2023-07-05 NOTE — TELEPHONE ENCOUNTER
Called  spoke to Jennifer ( daughter) does not have [facility] downloaded. Requested a face-to face visit. Scheduled for 07/14/23 @ 07:30 am.  Voiced understanding.

## 2023-07-28 PROCEDURE — 99284 EMERGENCY DEPT VISIT MOD MDM: CPT

## 2023-07-29 ENCOUNTER — HOSPITAL ENCOUNTER (EMERGENCY)
Facility: HOSPITAL | Age: 68
Discharge: HOME | End: 2023-07-29
Attending: FAMILY MEDICINE
Payer: MEDICARE

## 2023-07-29 VITALS
SYSTOLIC BLOOD PRESSURE: 163 MMHG | HEART RATE: 58 BPM | RESPIRATION RATE: 16 BRPM | HEIGHT: 66 IN | OXYGEN SATURATION: 98 % | WEIGHT: 271.19 LBS | TEMPERATURE: 98 F | BODY MASS INDEX: 43.58 KG/M2 | DIASTOLIC BLOOD PRESSURE: 75 MMHG

## 2023-07-29 DIAGNOSIS — M79.604 LOWER EXTREMITY PAIN, BILATERAL: ICD-10-CM

## 2023-07-29 DIAGNOSIS — R25.2 MUSCLE CRAMPING: Primary | ICD-10-CM

## 2023-07-29 DIAGNOSIS — M79.605 LOWER EXTREMITY PAIN, BILATERAL: ICD-10-CM

## 2023-07-29 LAB
ALBUMIN SERPL-MCNC: 2.9 G/DL (ref 3.4–4.8)
ALBUMIN/GLOB SERPL: 0.7 RATIO (ref 1.1–2)
ALP SERPL-CCNC: 125 UNIT/L (ref 40–150)
ALT SERPL-CCNC: 12 UNIT/L (ref 0–55)
AST SERPL-CCNC: 15 UNIT/L (ref 5–34)
BASOPHILS # BLD AUTO: 0.02 X10(3)/MCL
BASOPHILS NFR BLD AUTO: 0.3 %
BILIRUBIN DIRECT+TOT PNL SERPL-MCNC: 0.2 MG/DL
BUN SERPL-MCNC: 11.5 MG/DL (ref 9.8–20.1)
CALCIUM SERPL-MCNC: 8.6 MG/DL (ref 8.4–10.2)
CHLORIDE SERPL-SCNC: 108 MMOL/L (ref 98–107)
CO2 SERPL-SCNC: 25 MMOL/L (ref 23–31)
CREAT SERPL-MCNC: 0.81 MG/DL (ref 0.55–1.02)
EOSINOPHIL # BLD AUTO: 0.15 X10(3)/MCL (ref 0–0.9)
EOSINOPHIL NFR BLD AUTO: 1.9 %
ERYTHROCYTE [DISTWIDTH] IN BLOOD BY AUTOMATED COUNT: 16.4 % (ref 11.5–17)
GFR SERPLBLD CREATININE-BSD FMLA CKD-EPI: >60 MLS/MIN/1.73/M2
GLOBULIN SER-MCNC: 4.4 GM/DL (ref 2.4–3.5)
GLUCOSE SERPL-MCNC: 100 MG/DL (ref 82–115)
HCT VFR BLD AUTO: 31.9 % (ref 37–47)
HGB BLD-MCNC: 10 G/DL (ref 12–16)
IMM GRANULOCYTES # BLD AUTO: 0.04 X10(3)/MCL (ref 0–0.04)
IMM GRANULOCYTES NFR BLD AUTO: 0.5 %
LYMPHOCYTES # BLD AUTO: 2.23 X10(3)/MCL (ref 0.6–4.6)
LYMPHOCYTES NFR BLD AUTO: 28.1 %
MAGNESIUM SERPL-MCNC: 1.9 MG/DL (ref 1.6–2.6)
MCH RBC QN AUTO: 23.5 PG (ref 27–31)
MCHC RBC AUTO-ENTMCNC: 31.3 G/DL (ref 33–36)
MCV RBC AUTO: 74.9 FL (ref 80–94)
MONOCYTES # BLD AUTO: 0.58 X10(3)/MCL (ref 0.1–1.3)
MONOCYTES NFR BLD AUTO: 7.3 %
NEUTROPHILS # BLD AUTO: 4.92 X10(3)/MCL (ref 2.1–9.2)
NEUTROPHILS NFR BLD AUTO: 61.9 %
NRBC BLD AUTO-RTO: 0 %
PLATELET # BLD AUTO: 271 X10(3)/MCL (ref 130–400)
PMV BLD AUTO: 9.9 FL (ref 7.4–10.4)
POTASSIUM SERPL-SCNC: 3.8 MMOL/L (ref 3.5–5.1)
PROT SERPL-MCNC: 7.3 GM/DL (ref 5.8–7.6)
RBC # BLD AUTO: 4.26 X10(6)/MCL (ref 4.2–5.4)
SODIUM SERPL-SCNC: 144 MMOL/L (ref 136–145)
WBC # SPEC AUTO: 7.94 X10(3)/MCL (ref 4.5–11.5)

## 2023-07-29 PROCEDURE — 80053 COMPREHEN METABOLIC PANEL: CPT | Performed by: FAMILY MEDICINE

## 2023-07-29 PROCEDURE — 63600175 PHARM REV CODE 636 W HCPCS: Performed by: FAMILY MEDICINE

## 2023-07-29 PROCEDURE — 25000003 PHARM REV CODE 250: Performed by: FAMILY MEDICINE

## 2023-07-29 PROCEDURE — 83735 ASSAY OF MAGNESIUM: CPT | Performed by: FAMILY MEDICINE

## 2023-07-29 PROCEDURE — 85025 COMPLETE CBC W/AUTO DIFF WBC: CPT | Performed by: FAMILY MEDICINE

## 2023-07-29 RX ORDER — HYDROCODONE BITARTRATE AND ACETAMINOPHEN 7.5; 325 MG/1; MG/1
1 TABLET ORAL
Status: COMPLETED | OUTPATIENT
Start: 2023-07-29 | End: 2023-07-29

## 2023-07-29 RX ORDER — HYDROCODONE BITARTRATE AND ACETAMINOPHEN 5; 325 MG/1; MG/1
1 TABLET ORAL EVERY 6 HOURS PRN
Qty: 15 TABLET | Refills: 0 | Status: SHIPPED | OUTPATIENT
Start: 2023-07-29 | End: 2023-08-01

## 2023-07-29 RX ADMIN — HYDROCODONE BITARTRATE AND ACETAMINOPHEN 1 TABLET: 7.5; 325 TABLET ORAL at 12:07

## 2023-07-29 RX ADMIN — SODIUM CHLORIDE, POTASSIUM CHLORIDE, SODIUM LACTATE AND CALCIUM CHLORIDE 1000 ML: 600; 310; 30; 20 INJECTION, SOLUTION INTRAVENOUS at 12:07

## 2023-07-29 NOTE — ED PROVIDER NOTES
"Encounter Date: 7/28/2023       History     Chief Complaint   Patient presents with    Cramping     Pt c/o bilateral lower leg cramping now with left shoulder "locking up"x1 week      Patient is a 68-year-old female presents emergency room complaints of cramping in bilateral lower extremities.  Symptoms been ongoing for a week, reports worsened the pain today.  She denies fever chills.  Denies nausea or vomiting.  Denies chest pain or shortness of breath.    The history is provided by the patient.     Review of patient's allergies indicates:   Allergen Reactions    Penicillins Hives    Aspirin Rash    Keflex [cephalexin] Rash    Tramadol Rash     Past Medical History:   Diagnosis Date    Hyperlipidemia     Hypertension     Personal history of colonic polyps 04/14/2022     Past Surgical History:   Procedure Laterality Date    COLONOSCOPY  04/14/2022     Family History   Problem Relation Age of Onset    Heart attack Mother     Thyroid disease Mother     Diabetes Father     Thyroid disease Father     Heart attack Father      Social History     Tobacco Use    Smoking status: Never     Passive exposure: Current    Smokeless tobacco: Never   Substance Use Topics    Alcohol use: Not Currently    Drug use: Never     Review of Systems   Constitutional:  Negative for chills, fatigue and fever.   HENT:  Negative for ear pain, rhinorrhea and sore throat.    Eyes:  Negative for photophobia and pain.   Respiratory:  Negative for cough, shortness of breath and wheezing.    Cardiovascular:  Negative for chest pain.   Gastrointestinal:  Negative for abdominal pain, diarrhea, nausea and vomiting.   Genitourinary:  Negative for dysuria.   Musculoskeletal:  Positive for myalgias.   Neurological:  Negative for dizziness, weakness and headaches.   All other systems reviewed and are negative.      Physical Exam     Initial Vitals [07/28/23 2358]   BP Pulse Resp Temp SpO2   (!) 145/68 73 20 98.4 °F (36.9 °C) 100 %      MAP       --     "     Physical Exam    Nursing note and vitals reviewed.  Constitutional: She appears well-developed and well-nourished. No distress.   HENT:   Head: Normocephalic and atraumatic.   Eyes: Conjunctivae and EOM are normal. Pupils are equal, round, and reactive to light.   Neck: Neck supple.   Normal range of motion.  Cardiovascular:  Normal rate, regular rhythm, normal heart sounds and intact distal pulses.     Exam reveals no gallop and no friction rub.       No murmur heard.  2+ dorsalis pedis pulses bilaterally   Pulmonary/Chest: Breath sounds normal. No respiratory distress. She has no wheezes. She has no rhonchi. She has no rales.   Abdominal: Abdomen is soft. Bowel sounds are normal. She exhibits no distension. There is no abdominal tenderness. There is no rebound and no guarding.   Musculoskeletal:         General: No edema. Normal range of motion.      Cervical back: Normal range of motion and neck supple.     Neurological: She is alert and oriented to person, place, and time.   Skin: Skin is warm and dry. Capillary refill takes less than 2 seconds. No erythema.   Psychiatric: She has a normal mood and affect. Her behavior is normal. Judgment and thought content normal.         ED Course   Procedures  Labs Reviewed   COMPREHENSIVE METABOLIC PANEL - Abnormal; Notable for the following components:       Result Value    Chloride 108 (*)     Albumin Level 2.9 (*)     Globulin 4.4 (*)     Albumin/Globulin Ratio 0.7 (*)     All other components within normal limits   CBC WITH DIFFERENTIAL - Abnormal; Notable for the following components:    Hgb 10.0 (*)     Hct 31.9 (*)     MCV 74.9 (*)     MCH 23.5 (*)     MCHC 31.3 (*)     All other components within normal limits   MAGNESIUM - Normal   CBC W/ AUTO DIFFERENTIAL    Narrative:     The following orders were created for panel order CBC Auto Differential.  Procedure                               Abnormality         Status                     ---------                      "          -----------         ------                     CBC with Differential[[id number]]        Abnormal            Final result                 Please view results for these tests on the individual orders.   URINALYSIS, REFLEX TO URINE CULTURE   EXTRA TUBES    Narrative:     The following orders were created for panel order EXTRA TUBES.  Procedure                               Abnormality         Status                     ---------                               -----------         ------                     Light Blue Top Hold[[id number]]                              In process                 Lavender Top Hold[[id number]]                                In process                 Gold Top Hold[[id number]]                                    In process                   Please view results for these tests on the individual orders.   LIGHT BLUE TOP HOLD   LAVENDER TOP HOLD   GOLD TOP HOLD          Imaging Results    None          Medications   lactated ringers bolus 1,000 mL (1,000 mLs Intravenous New Bag 7/29/23 0052)   HYDROcodone-acetaminophen 7.5-325 mg per tablet 1 tablet (1 tablet Oral Given 7/29/23 0041)     Medical Decision Making:   Initial Assessment:   Patient is a 68-year-old female with a history of hypertension, history of pulmonary embolism currently on Xarelto presents emergency room complaints of bilateral lower extremity cramping that she describes as "worse than a Charley horse".  Patient is currently on Lasix which she reports his for lower extremity edema.  Denies any history of congestive heart failure.  Will obtain laboratory evaluation including CBC CMP and a magnesium level.  Will administer IV fluids.  Differential Diagnosis:   Electrolyte abnormality, dehydration, myalgia             ED Course as of 07/29/23 0231   Sat Jul 29, 2023   0208 Magnesium: 1.90 [MW]   0208 Sodium: 144 [MW]   0208 Potassium: 3.8 [MW]   0208 Chloride(!): 108 [MW]   0208 CO2: 25 [MW]   0208 Glucose: 100 [MW]   0208 " Calcium: 8.6 [MW]   0208 PROTEIN TOTAL: 7.3 [MW]   0208 Albumin(!): 2.9 [MW]   0208 Globulin, Total(!): 4.4 [MW]   0208 Albumin/Globulin Ratio(!): 0.7 [MW]   0208 BILIRUBIN TOTAL: 0.2 [MW]   0208 Alkaline Phosphatase: 125 [MW]   0208 ALT: 12 [MW]   0208 AST: 15 [MW]   0208 eGFR: >60 [MW]   0208 WBC: 7.94 [MW]   0208 Hematocrit(!): 31.9 [MW]   0208 Platelets: 271 [MW]   0218 Patient feeling much improved.  Discussed results with the patient.  Stable for discharge home.  ER precautions given for any acute worsening. [MW]      ED Course User Index  [MW] Eduar Mirza MD                 Clinical Impression:   Final diagnoses:  [R25.2] Muscle cramping (Primary)  [M79.604, M79.605] Lower extremity pain, bilateral        ED Disposition Condition    Discharge Stable          ED Prescriptions       Medication Sig Dispense Start Date End Date Auth. Provider    HYDROcodone-acetaminophen (NORCO) 5-325 mg per tablet Take 1 tablet by mouth every 6 (six) hours as needed for Pain. 15 tablet 7/29/2023 8/1/2023 Eduar Mirza MD          Follow-up Information       Follow up With Specialties Details Why Contact Info    [facility] - Emergency Dept Emergency Medicine  As needed, If symptoms worsen [address] [facility] [address] [phone]    Primary Care Physician  In 5 days               Eduar Mirza MD  07/29/23 0238

## 2023-08-12 ENCOUNTER — HOSPITAL ENCOUNTER (EMERGENCY)
Facility: HOSPITAL | Age: 68
Discharge: HOME | End: 2023-08-12
Attending: EMERGENCY MEDICINE
Payer: MEDICARE

## 2023-08-12 VITALS
TEMPERATURE: 98 F | SYSTOLIC BLOOD PRESSURE: 149 MMHG | WEIGHT: 270 LBS | OXYGEN SATURATION: 100 % | HEIGHT: 66 IN | HEART RATE: 65 BPM | RESPIRATION RATE: 18 BRPM | DIASTOLIC BLOOD PRESSURE: 63 MMHG | BODY MASS INDEX: 43.39 KG/M2

## 2023-08-12 DIAGNOSIS — M54.32 SCIATICA, LEFT SIDE: Primary | ICD-10-CM

## 2023-08-12 PROCEDURE — 63600175 PHARM REV CODE 636 W HCPCS: Performed by: EMERGENCY MEDICINE

## 2023-08-12 PROCEDURE — 99284 EMERGENCY DEPT VISIT MOD MDM: CPT

## 2023-08-12 RX ORDER — HYDROCODONE BITARTRATE AND ACETAMINOPHEN 7.5; 325 MG/1; MG/1
1 TABLET ORAL EVERY 12 HOURS PRN
Qty: 14 TABLET | Refills: 0 | Status: SHIPPED | OUTPATIENT
Start: 2023-08-12 | End: 2023-09-26 | Stop reason: ALTCHOICE

## 2023-08-12 RX ORDER — PREDNISONE 20 MG/1
40 TABLET ORAL DAILY
Qty: 10 TABLET | Refills: 0 | Status: SHIPPED | OUTPATIENT
Start: 2023-08-12 | End: 2023-08-17

## 2023-08-12 RX ADMIN — PREDNISONE 60 MG: 50 TABLET ORAL at 07:08

## 2023-08-12 NOTE — ED PROVIDER NOTES
Encounter Date: 8/12/2023       History     Chief Complaint   Patient presents with    Leg Pain     Pain to bilateral thighs radiating down legs x1 week no injury. No distress, ambulatory with walker.      Left-sided low back and thigh pain exacerbation, history of chronic sciatica with left-greater-than-right symptoms, demonstrated have significant degenerative change on sequential imaging of the lumbar spine as recently as last year, data reviewed.  She has had falls in the past but none recently.  Drove herself here.  Has not seen orthopedics or neurosurgery.  No urinary complaints.  No weakness or change in baseline gait, she uses a wheeled walker for stability.  No recent pain medicine.  She is chronically anticoagulated.  No other recent identified injury.  No other complaints.    The history is provided by the patient. No  was used.     Review of patient's allergies indicates:   Allergen Reactions    Penicillins Hives    Aspirin Rash    Keflex [cephalexin] Rash    Tramadol Rash     Past Medical History:   Diagnosis Date    Hyperlipidemia     Hypertension     Personal history of colonic polyps 04/14/2022     Past Surgical History:   Procedure Laterality Date    COLONOSCOPY  04/14/2022     Family History   Problem Relation Age of Onset    Heart attack Mother     Thyroid disease Mother     Diabetes Father     Thyroid disease Father     Heart attack Father      Social History     Tobacco Use    Smoking status: Never     Passive exposure: Current    Smokeless tobacco: Never   Substance Use Topics    Alcohol use: Not Currently    Drug use: Never     Review of Systems   Constitutional:  Negative for activity change, fatigue and fever.   HENT:  Negative for congestion, ear pain, facial swelling, nosebleeds, sinus pressure and sore throat.    Eyes:  Negative for pain, discharge, redness and visual disturbance.   Respiratory:  Negative for cough, choking, chest tightness, shortness of breath and  wheezing.    Cardiovascular:  Negative for chest pain, palpitations and leg swelling.   Gastrointestinal:  Negative for abdominal distention, abdominal pain, nausea and vomiting.   Endocrine: Negative for heat intolerance, polydipsia and polyuria.   Genitourinary:  Negative for difficulty urinating, dysuria, flank pain, hematuria and urgency.   Musculoskeletal:  Positive for back pain. Negative for gait problem, joint swelling and myalgias.        See HPI   Skin:  Negative for color change and rash.   Allergic/Immunologic: Negative for environmental allergies and food allergies.   Neurological:  Negative for dizziness, weakness, numbness and headaches.   Hematological:  Negative for adenopathy. Does not bruise/bleed easily.   Psychiatric/Behavioral:  Negative for agitation and behavioral problems. The patient is not nervous/anxious.    All other systems reviewed and are negative.      Physical Exam     Initial Vitals   BP Pulse Resp Temp SpO2   08/12/23 0712 08/12/23 0712 08/12/23 0712 08/12/23 0748 08/12/23 0712   (!) 149/63 65 18 97.9 °F (36.6 °C) 100 %      MAP       --                Physical Exam    Nursing note and vitals reviewed.  Constitutional: She appears well-developed and well-nourished. She is not diaphoretic. She appears distressed.   Obese/ mild discomfort   HENT:   Head: Normocephalic and atraumatic.   Mouth/Throat: No oropharyngeal exudate.   Eyes: Conjunctivae and EOM are normal. Pupils are equal, round, and reactive to light. Right eye exhibits no discharge. Left eye exhibits no discharge. No scleral icterus.   Neck: Neck supple. No thyromegaly present. No tracheal deviation present. No JVD present.   Normal range of motion.  Cardiovascular:  Normal rate, regular rhythm, normal heart sounds and intact distal pulses.     Exam reveals no gallop and no friction rub.       No murmur heard.  Pulmonary/Chest: Breath sounds normal. No stridor. No respiratory distress. She has no wheezes. She has no  rhonchi. She has no rales. She exhibits no tenderness.   Abdominal: Abdomen is soft. Bowel sounds are normal. She exhibits no distension and no mass. There is no abdominal tenderness. There is no rebound and no guarding.   Musculoskeletal:         General: No tenderness or edema.      Cervical back: Normal range of motion and neck supple.      Comments: Using a wheeled walker for ambulation, decreased lumbar range of motion, decreased hip flexion bilaterally.  Stable gait but not able to stand with 1 foot elevated for any significant length of time.  No midline lumbar tenderness, indicates pain along the left sciatic pathway.  No acute findings.     Neurological: She is alert and oriented to person, place, and time. She has normal strength.   Skin: Skin is warm and dry. No rash and no abscess noted. No erythema.   Psychiatric: She has a normal mood and affect. Her behavior is normal. Judgment and thought content normal.         ED Course   Procedures  Labs Reviewed - No data to display       Imaging Results    None          Medications   predniSONE tablet 60 mg (60 mg Oral Given 8/12/23 0750)                              Clinical Impression:   Final diagnoses:  [M54.32] Sciatica, left side (Primary)        ED Disposition Condition    Discharge Stable          ED Prescriptions       Medication Sig Dispense Start Date End Date Auth. Provider    predniSONE (DELTASONE) 20 MG tablet Take 2 tablets (40 mg total) by mouth once daily. for 5 days 10 tablet 8/12/2023 8/17/2023 Ramses Omer MD    HYDROcodone-acetaminophen (NORCO) 7.5-325 mg per tablet Take 1 tablet by mouth every 12 (twelve) hours as needed for Pain. 14 tablet 8/12/2023 -- Ramses Omer MD          Follow-up Information       Follow up With Specialties Details Why Contact Info    Tiffany Harris, FNP Family Medicine Schedule an appointment as soon as possible for a visit   [address] [facility] [address] [phone]      [facility]  - Emergency Dept Emergency Medicine  As needed [address] [facility] [address]  [phone]             Ramess Omer MD  08/12/23 0754

## 2023-08-12 NOTE — DISCHARGE INSTRUCTIONS
Fall precautions at all times.      You have significant degenerative change in your back and I am referring you to orthopedics for further evaluation. They will call to schedule the appointment.

## 2023-08-24 ENCOUNTER — OFFICE VISIT (OUTPATIENT)
Dept: UROLOGY | Facility: CLINIC | Age: 68
End: 2023-08-24
Payer: MEDICARE

## 2023-08-24 VITALS
DIASTOLIC BLOOD PRESSURE: 73 MMHG | BODY MASS INDEX: 43.36 KG/M2 | HEIGHT: 66 IN | RESPIRATION RATE: 20 BRPM | HEART RATE: 68 BPM | TEMPERATURE: 98 F | WEIGHT: 269.81 LBS | OXYGEN SATURATION: 98 % | SYSTOLIC BLOOD PRESSURE: 130 MMHG

## 2023-08-24 DIAGNOSIS — N30.90 RECURRENT CYSTITIS: Primary | ICD-10-CM

## 2023-08-24 LAB
BILIRUB SERPL-MCNC: NEGATIVE MG/DL
BLOOD URINE, POC: NORMAL
COLOR, POC UA: YELLOW
GLUCOSE UR QL STRIP: NEGATIVE
KETONES UR QL STRIP: NEGATIVE
LEUKOCYTE ESTERASE URINE, POC: NORMAL
NITRITE, POC UA: POSITIVE
PH, POC UA: 5.5
PROTEIN, POC: NEGATIVE
SPECIFIC GRAVITY, POC UA: 1.01
UROBILINOGEN, POC UA: 0.2

## 2023-08-24 PROCEDURE — 3008F PR BODY MASS INDEX (BMI) DOCUMENTED: ICD-10-PCS | Mod: CPTII,,, | Performed by: UROLOGY

## 2023-08-24 PROCEDURE — 3078F DIAST BP <80 MM HG: CPT | Mod: CPTII,,, | Performed by: UROLOGY

## 2023-08-24 PROCEDURE — 1101F PR PT FALLS ASSESS DOC 0-1 FALLS W/OUT INJ PAST YR: ICD-10-PCS | Mod: CPTII,,, | Performed by: UROLOGY

## 2023-08-24 PROCEDURE — 1126F AMNT PAIN NOTED NONE PRSNT: CPT | Mod: CPTII,,, | Performed by: UROLOGY

## 2023-08-24 PROCEDURE — 99213 PR OFFICE/OUTPT VISIT, EST, LEVL III, 20-29 MIN: ICD-10-PCS | Mod: S$PBB,,, | Performed by: UROLOGY

## 2023-08-24 PROCEDURE — 99215 OFFICE O/P EST HI 40 MIN: CPT | Mod: PBBFAC | Performed by: UROLOGY

## 2023-08-24 PROCEDURE — 3288F FALL RISK ASSESSMENT DOCD: CPT | Mod: CPTII,,, | Performed by: UROLOGY

## 2023-08-24 PROCEDURE — 3066F PR DOCUMENTATION OF TREATMENT FOR NEPHROPATHY: ICD-10-PCS | Mod: CPTII,,, | Performed by: UROLOGY

## 2023-08-24 PROCEDURE — 1126F PR PAIN SEVERITY QUANTIFIED, NO PAIN PRESENT: ICD-10-PCS | Mod: CPTII,,, | Performed by: UROLOGY

## 2023-08-24 PROCEDURE — 1101F PT FALLS ASSESS-DOCD LE1/YR: CPT | Mod: CPTII,,, | Performed by: UROLOGY

## 2023-08-24 PROCEDURE — 3075F SYST BP GE 130 - 139MM HG: CPT | Mod: CPTII,,, | Performed by: UROLOGY

## 2023-08-24 PROCEDURE — 99213 OFFICE O/P EST LOW 20 MIN: CPT | Mod: S$PBB,,, | Performed by: UROLOGY

## 2023-08-24 PROCEDURE — 3066F NEPHROPATHY DOC TX: CPT | Mod: CPTII,,, | Performed by: UROLOGY

## 2023-08-24 PROCEDURE — 3060F POS MICROALBUMINURIA REV: CPT | Mod: CPTII,,, | Performed by: UROLOGY

## 2023-08-24 PROCEDURE — 1160F RVW MEDS BY RX/DR IN RCRD: CPT | Mod: CPTII,,, | Performed by: UROLOGY

## 2023-08-24 PROCEDURE — 1159F MED LIST DOCD IN RCRD: CPT | Mod: CPTII,,, | Performed by: UROLOGY

## 2023-08-24 PROCEDURE — 3075F PR MOST RECENT SYSTOLIC BLOOD PRESS GE 130-139MM HG: ICD-10-PCS | Mod: CPTII,,, | Performed by: UROLOGY

## 2023-08-24 PROCEDURE — 1159F PR MEDICATION LIST DOCUMENTED IN MEDICAL RECORD: ICD-10-PCS | Mod: CPTII,,, | Performed by: UROLOGY

## 2023-08-24 PROCEDURE — 3008F BODY MASS INDEX DOCD: CPT | Mod: CPTII,,, | Performed by: UROLOGY

## 2023-08-24 PROCEDURE — 1160F PR REVIEW ALL MEDS BY PRESCRIBER/CLIN PHARMACIST DOCUMENTED: ICD-10-PCS | Mod: CPTII,,, | Performed by: UROLOGY

## 2023-08-24 PROCEDURE — 3288F PR FALLS RISK ASSESSMENT DOCUMENTED: ICD-10-PCS | Mod: CPTII,,, | Performed by: UROLOGY

## 2023-08-24 PROCEDURE — 3078F PR MOST RECENT DIASTOLIC BLOOD PRESSURE < 80 MM HG: ICD-10-PCS | Mod: CPTII,,, | Performed by: UROLOGY

## 2023-08-24 PROCEDURE — 3060F PR POS MICROALBUMINURIA RESULT DOCUMENTED/REVIEW: ICD-10-PCS | Mod: CPTII,,, | Performed by: UROLOGY

## 2023-08-24 PROCEDURE — 81001 URINALYSIS AUTO W/SCOPE: CPT | Mod: PBBFAC | Performed by: UROLOGY

## 2023-08-24 RX ORDER — NITROFURANTOIN MACROCRYSTALS 50 MG/1
50 CAPSULE ORAL DAILY
Qty: 90 CAPSULE | Refills: 3 | Status: SHIPPED | OUTPATIENT
Start: 2023-08-24

## 2023-08-24 NOTE — PROGRESS NOTES
CC:  Follow-up    HPI:  Leatha Martinez is a 68 y.o. female here for follow-up of recurrent urinary tract infections.  She has a history of recurring urinary tract infections.  She is been on Macrodantin prophylaxis for the last three months.  She states that while she is been on this she is not had any symptoms of dysuria or thinking that she may have an infection.  She had a CT scan which was negative in November of 2022 and also a cystoscopy three months ago which showed some cystitis cystica but no other concerning lesions.  The urethra was noted to be retracted into the vagina.      Urinalysis:    Results for orders placed or performed in visit on 08/24/23   POCT URINE DIPSTICK WITH MICROSCOPE, AUTOMATED   Result Value Ref Range    Color, UA Yellow     Spec Grav UA 1.015     pH, UA 5.5     WBC, UA Small     Nitrite, UA Positive     Protein, POC Negative     Glucose, UA Negative     Ketones, UA Negative     Urobilinogen, UA 0.2     Bilirubin, POC Negative     Blood, UA Trace-intact      Microscopic: 4-5 squamous epithelial cells,  1-2 WBC, rare bacteria per HPF    Lab Results:  Recent Labs     07/29/23  0033   CREATININE 0.81        ROS:  All systems reviewed and are negative except as documented in HPI and/or Assessment and Plan.     Patient Active Problem List:     Patient Active Problem List   Diagnosis    Hypertension    Gastro-esophageal reflux disease without esophagitis    Other hyperlipidemia    Class 3 severe obesity due to excess calories with serious comorbidity and body mass index (BMI) of 45.0 to 49.9 in adult    Personal history of other venous thrombosis and embolism    Personal history of COVID-19    Abnormal TSH    Leukocytosis    Post-COVID chronic dyspnea        Past Medical History:  Past Medical History:   Diagnosis Date    Hyperlipidemia     Hypertension     Personal history of colonic polyps 04/14/2022        Past Surgical History:  Past Surgical History:   Procedure Laterality Date     COLONOSCOPY  04/14/2022        Family History:  Family History   Problem Relation Age of Onset    Heart attack Mother     Thyroid disease Mother     Diabetes Father     Thyroid disease Father     Heart attack Father         Social History:  Social History     Socioeconomic History    Marital status: Single    Number of children: 3   Tobacco Use    Smoking status: Never     Passive exposure: Current    Smokeless tobacco: Never   Substance and Sexual Activity    Alcohol use: Not Currently    Drug use: Never    Sexual activity: Not Currently     Partners: Male     Social Determinants of Health     Financial Resource Strain: Medium Risk (4/4/2023)    Overall Financial Resource Strain (CARDIA)     Difficulty of Paying Living Expenses: Somewhat hard   Food Insecurity: No Food Insecurity (4/4/2023)    Hunger Vital Sign     Worried About Running Out of Food in the Last Year: Never true     Ran Out of Food in the Last Year: Never true   Transportation Needs: Unmet Transportation Needs (4/4/2023)    PRAPARE - Transportation     Lack of Transportation (Medical): Yes     Lack of Transportation (Non-Medical): Yes   Physical Activity: Unknown (4/4/2023)    Exercise Vital Sign     Days of Exercise per Week: 3 days   Stress: Stress Concern Present (4/4/2023)    [employer] - Occupational Stress Questionnaire     Feeling of Stress : To some extent   Social Connections: Unknown (4/4/2023)    Social Connection and Isolation Panel [NHANES]     Frequency of Communication with Friends and Family: Three times a week     Frequency of Social Gatherings with Friends and Family: Three times a week     Attends Protestant Services: 1 to 4 times per year     Active Member of Clubs or Organizations: No     Attends Club or Organization Meetings: Never   Housing Stability: Low Risk  (4/4/2023)    Housing Stability Vital Sign     Unable to Pay for Housing in the Last Year: No     Number of Places Lived in the Last Year: 1      Unstable Housing in the Last Year: No        Allergies:  Review of patient's allergies indicates:   Allergen Reactions    Penicillins Hives    Aspirin Rash    Keflex [cephalexin] Rash    Tramadol Rash        Objective:  Vitals:    08/24/23 0836   BP: 130/73   Pulse: 68   Resp: 20   Temp: 98.3 °F (36.8 °C)     General:  Well developed, well nourished adult female in no acute distress  Abdomen: Soft, nontender, no masses  Extremities:  No clubbing, cyanosis, or edema  Neurologic:  Grossly intact  Musculoskeletal:  Normal tone      Assessment:  1. Recurrent cystitis  - POCT URINE DIPSTICK WITH MICROSCOPE, AUTOMATED  - nitrofurantoin (MACRODANTIN) 50 MG capsule; Take 1 capsule (50 mg total) by mouth once daily.  Dispense: 90 capsule; Refill: 3       Plan:  We will have her continue with the Macrodantin suppression as she is doing well on this.  She is currently asymptomatic and I think the urinalysis findings from today are due to contamination.    Follow-up:    Three months.

## 2023-09-12 ENCOUNTER — HOSPITAL ENCOUNTER (EMERGENCY)
Facility: HOSPITAL | Age: 68
Discharge: HOME | End: 2023-09-12
Attending: FAMILY MEDICINE
Payer: MEDICARE

## 2023-09-12 VITALS
HEIGHT: 66 IN | HEART RATE: 81 BPM | DIASTOLIC BLOOD PRESSURE: 77 MMHG | WEIGHT: 273 LBS | BODY MASS INDEX: 43.87 KG/M2 | RESPIRATION RATE: 17 BRPM | SYSTOLIC BLOOD PRESSURE: 135 MMHG | OXYGEN SATURATION: 98 % | TEMPERATURE: 98 F

## 2023-09-12 DIAGNOSIS — M79.604 CHRONIC PAIN OF RIGHT LOWER EXTREMITY: Primary | ICD-10-CM

## 2023-09-12 DIAGNOSIS — G89.29 CHRONIC PAIN OF RIGHT LOWER EXTREMITY: Primary | ICD-10-CM

## 2023-09-12 LAB
ALBUMIN SERPL-MCNC: 3 G/DL (ref 3.4–4.8)
ALBUMIN/GLOB SERPL: 0.6 RATIO (ref 1.1–2)
ALP SERPL-CCNC: 132 UNIT/L (ref 40–150)
ALT SERPL-CCNC: 12 UNIT/L (ref 0–55)
AST SERPL-CCNC: 16 UNIT/L (ref 5–34)
BASOPHILS # BLD AUTO: 0.02 X10(3)/MCL
BASOPHILS NFR BLD AUTO: 0.3 %
BILIRUB SERPL-MCNC: 0.3 MG/DL
BUN SERPL-MCNC: 11.2 MG/DL (ref 9.8–20.1)
CALCIUM SERPL-MCNC: 8.9 MG/DL (ref 8.4–10.2)
CHLORIDE SERPL-SCNC: 107 MMOL/L (ref 98–107)
CO2 SERPL-SCNC: 26 MMOL/L (ref 23–31)
CREAT SERPL-MCNC: 0.73 MG/DL (ref 0.55–1.02)
EOSINOPHIL # BLD AUTO: 0.17 X10(3)/MCL (ref 0–0.9)
EOSINOPHIL NFR BLD AUTO: 2.3 %
ERYTHROCYTE [DISTWIDTH] IN BLOOD BY AUTOMATED COUNT: 15.4 % (ref 11.5–17)
GFR SERPLBLD CREATININE-BSD FMLA CKD-EPI: >60 MLS/MIN/1.73/M2
GLOBULIN SER-MCNC: 4.7 GM/DL (ref 2.4–3.5)
GLUCOSE SERPL-MCNC: 90 MG/DL (ref 82–115)
HCT VFR BLD AUTO: 33.1 % (ref 37–47)
HGB BLD-MCNC: 10.1 G/DL (ref 12–16)
IMM GRANULOCYTES # BLD AUTO: 0.03 X10(3)/MCL (ref 0–0.04)
IMM GRANULOCYTES NFR BLD AUTO: 0.4 %
LYMPHOCYTES # BLD AUTO: 2.29 X10(3)/MCL (ref 0.6–4.6)
LYMPHOCYTES NFR BLD AUTO: 30.7 %
MCH RBC QN AUTO: 23.2 PG (ref 27–31)
MCHC RBC AUTO-ENTMCNC: 30.5 G/DL (ref 33–36)
MCV RBC AUTO: 75.9 FL (ref 80–94)
MONOCYTES # BLD AUTO: 0.49 X10(3)/MCL (ref 0.1–1.3)
MONOCYTES NFR BLD AUTO: 6.6 %
NEUTROPHILS # BLD AUTO: 4.47 X10(3)/MCL (ref 2.1–9.2)
NEUTROPHILS NFR BLD AUTO: 59.7 %
NRBC BLD AUTO-RTO: 0 %
PLATELET # BLD AUTO: 289 X10(3)/MCL (ref 130–400)
PMV BLD AUTO: 10.2 FL (ref 7.4–10.4)
POTASSIUM SERPL-SCNC: 3.8 MMOL/L (ref 3.5–5.1)
PROT SERPL-MCNC: 7.7 GM/DL (ref 5.8–7.6)
RBC # BLD AUTO: 4.36 X10(6)/MCL (ref 4.2–5.4)
SODIUM SERPL-SCNC: 144 MMOL/L (ref 136–145)
WBC # SPEC AUTO: 7.47 X10(3)/MCL (ref 4.5–11.5)

## 2023-09-12 PROCEDURE — 80053 COMPREHEN METABOLIC PANEL: CPT | Performed by: FAMILY MEDICINE

## 2023-09-12 PROCEDURE — 25000003 PHARM REV CODE 250: Performed by: FAMILY MEDICINE

## 2023-09-12 PROCEDURE — 99283 EMERGENCY DEPT VISIT LOW MDM: CPT

## 2023-09-12 PROCEDURE — 85025 COMPLETE CBC W/AUTO DIFF WBC: CPT | Performed by: FAMILY MEDICINE

## 2023-09-12 RX ORDER — HYDROCODONE BITARTRATE AND ACETAMINOPHEN 7.5; 325 MG/1; MG/1
1 TABLET ORAL EVERY 6 HOURS PRN
Qty: 20 TABLET | Refills: 0 | Status: SHIPPED | OUTPATIENT
Start: 2023-09-12 | End: 2023-09-26 | Stop reason: ALTCHOICE

## 2023-09-12 RX ORDER — HYDROCODONE BITARTRATE AND ACETAMINOPHEN 10; 325 MG/1; MG/1
1 TABLET ORAL
Status: COMPLETED | OUTPATIENT
Start: 2023-09-12 | End: 2023-09-12

## 2023-09-12 RX ADMIN — HYDROCODONE BITARTRATE AND ACETAMINOPHEN 1 TABLET: 10; 325 TABLET ORAL at 02:09

## 2023-09-12 NOTE — ED PROVIDER NOTES
Encounter Date: 9/12/2023       History     Chief Complaint   Patient presents with    Leg Pain     Pt c/o  right leg pain starting behind right knee and radiating up to thigh and down to toes, this began on Monday, denies any accident or knowing cause. Pt reports this began while she was walking in the morning. Vss.      Patient is a 68 y.o. female presenting to the emergency room with right lower.  Patient reports region as well as the posterior knee.  Denies any specific injury or fall.  Patient does report history embolism, currently on Xarelto.  Patient reports that she had a prescription for Norco but recently ran out the pain has worsened since patient denies chest pain.  Denies shortness of breath.  Denies nausea or vomiting.    The history is provided by the patient.     Review of patient's allergies indicates:   Allergen Reactions    Penicillins Hives    Aspirin Rash    Keflex [cephalexin] Rash    Tramadol Rash     Past Medical History:   Diagnosis Date    Hyperlipidemia     Hypertension     Personal history of colonic polyps 04/14/2022     Past Surgical History:   Procedure Laterality Date    COLONOSCOPY  04/14/2022     Family History   Problem Relation Age of Onset    Heart attack Mother     Thyroid disease Mother     Diabetes Father     Thyroid disease Father     Heart attack Father      Social History     Tobacco Use    Smoking status: Never     Passive exposure: Current    Smokeless tobacco: Never   Substance Use Topics    Alcohol use: Not Currently    Drug use: Never     Review of Systems   Constitutional:  Negative for chills, fatigue and fever.   HENT:  Negative for ear pain, rhinorrhea and sore throat.    Eyes:  Negative for photophobia and pain.   Respiratory:  Negative for cough, shortness of breath and wheezing.    Cardiovascular:  Negative for chest pain.   Gastrointestinal:  Negative for abdominal pain, diarrhea, nausea and vomiting.   Genitourinary:  Negative for dysuria.   Neurological:   Negative for dizziness, weakness and headaches.   All other systems reviewed and are negative.      Physical Exam     Initial Vitals [09/12/23 0034]   BP Pulse Resp Temp SpO2   (!) 132/56 74 20 98 °F (36.7 °C) 99 %      MAP       --         Physical Exam    Nursing note and vitals reviewed.  Constitutional: She appears well-developed and well-nourished. No distress.   HENT:   Head: Normocephalic and atraumatic.   Eyes: Conjunctivae and EOM are normal. Pupils are equal, round, and reactive to light.   Neck: Neck supple.   Normal range of motion.  Cardiovascular:  Normal rate, regular rhythm, normal heart sounds and intact distal pulses.           No murmur heard.  Pulmonary/Chest: Breath sounds normal. No respiratory distress. She has no wheezes. She has no rhonchi. She has no rales.   Abdominal: Abdomen is soft. Bowel sounds are normal. She exhibits no distension. There is no abdominal tenderness. There is no rebound and no guarding.   Musculoskeletal:         General: No edema. Normal range of motion.      Cervical back: Normal range of motion and neck supple.      Comments: No appreciated erythema or swelling of the right lower extremity; no focal area of tenderness.  No right lateral hip pain appreciated.     Neurological: She is alert and oriented to person, place, and time.   Skin: Skin is warm and dry. Capillary refill takes less than 2 seconds. No erythema.   Psychiatric: She has a normal mood and affect. Her behavior is normal. Judgment and thought content normal.         ED Course   Procedures  Labs Reviewed   COMPREHENSIVE METABOLIC PANEL - Abnormal; Notable for the following components:       Result Value    Protein Total 7.7 (*)     Albumin Level 3.0 (*)     Globulin 4.7 (*)     Albumin/Globulin Ratio 0.6 (*)     All other components within normal limits   CBC WITH DIFFERENTIAL - Abnormal; Notable for the following components:    Hgb 10.1 (*)     Hct 33.1 (*)     MCV 75.9 (*)     MCH 23.2 (*)     MCHC  30.5 (*)     All other components within normal limits   CBC W/ AUTO DIFFERENTIAL    Narrative:     The following orders were created for panel order CBC Auto Differential.  Procedure                               Abnormality         Status                     ---------                               -----------         ------                     CBC with Differential[[id number]]       Abnormal            Final result                 Please view results for these tests on the individual orders.   URINALYSIS, REFLEX TO URINE CULTURE          Imaging Results    None          Medications   HYDROcodone-acetaminophen  mg per tablet 1 tablet (1 tablet Oral Given 9/12/23 0259)     Medical Decision Making  68 y.o. female with history of GERD, HTN, PE presents to the ER with right lower extremity pain.  Currently no acute abnormality appreciated.  Will obtain lab work, and treat symptomatically.  Will continue to monitor.    Amount and/or Complexity of Data Reviewed  Labs: ordered.    Risk  Prescription drug management.               ED Course as of 09/12/23 0509   Tue Sep 12, 2023   0507 Feeling improved; stable for discharge to home.  ER precautions for any acute worsening. [MW]      ED Course User Index  [MW] Eduar Mirza MD                    Clinical Impression:   Final diagnoses:  [M79.604, G89.29] Chronic pain of right lower extremity (Primary)        ED Disposition Condition    Discharge Stable          ED Prescriptions       Medication Sig Dispense Start Date End Date Auth. Provider    HYDROcodone-acetaminophen (NORCO) 7.5-325 mg per tablet Take 1 tablet by mouth every 6 (six) hours as needed for Pain. 20 tablet 9/12/2023 9/22/2023 Eduar Mirza MD          Follow-up Information       Follow up With Specialties Details Why Contact Info [facility] Emergency Medicine  As needed, If symptoms worsen [address] [facility] [address] [phone]     Tiffany Harris, [facility]   [address] [facility] [address]  [phone]               Eduar Mirza MD  09/12/23 9573

## 2023-09-26 ENCOUNTER — OFFICE VISIT (OUTPATIENT)
Dept: INTERNAL MEDICINE | Facility: CLINIC | Age: 68
End: 2023-09-26
Payer: MEDICARE

## 2023-09-26 VITALS
TEMPERATURE: 99 F | SYSTOLIC BLOOD PRESSURE: 121 MMHG | DIASTOLIC BLOOD PRESSURE: 74 MMHG | BODY MASS INDEX: 43.36 KG/M2 | RESPIRATION RATE: 18 BRPM | HEIGHT: 66 IN | WEIGHT: 269.81 LBS | HEART RATE: 102 BPM

## 2023-09-26 DIAGNOSIS — G89.29 CHRONIC RIGHT SHOULDER PAIN: ICD-10-CM

## 2023-09-26 DIAGNOSIS — Z23 NEEDS FLU SHOT: ICD-10-CM

## 2023-09-26 DIAGNOSIS — E04.1 THYROID NODULE: ICD-10-CM

## 2023-09-26 DIAGNOSIS — M17.0 OSTEOARTHRITIS OF BOTH KNEES, UNSPECIFIED OSTEOARTHRITIS TYPE: ICD-10-CM

## 2023-09-26 DIAGNOSIS — R79.89 ABNORMAL TSH: Primary | ICD-10-CM

## 2023-09-26 DIAGNOSIS — Z78.0 POST-MENOPAUSE: ICD-10-CM

## 2023-09-26 DIAGNOSIS — E66.01 CLASS 3 SEVERE OBESITY DUE TO EXCESS CALORIES WITH SERIOUS COMORBIDITY AND BODY MASS INDEX (BMI) OF 40.0 TO 44.9 IN ADULT: Chronic | ICD-10-CM

## 2023-09-26 DIAGNOSIS — Z91.81 RISK FOR FALLS: ICD-10-CM

## 2023-09-26 DIAGNOSIS — I10 PRIMARY HYPERTENSION: Chronic | ICD-10-CM

## 2023-09-26 DIAGNOSIS — E78.49 OTHER HYPERLIPIDEMIA: Chronic | ICD-10-CM

## 2023-09-26 DIAGNOSIS — M25.511 CHRONIC RIGHT SHOULDER PAIN: ICD-10-CM

## 2023-09-26 PROCEDURE — 1160F PR REVIEW ALL MEDS BY PRESCRIBER/CLIN PHARMACIST DOCUMENTED: ICD-10-PCS | Mod: CPTII,,, | Performed by: NURSE PRACTITIONER

## 2023-09-26 PROCEDURE — 3008F PR BODY MASS INDEX (BMI) DOCUMENTED: ICD-10-PCS | Mod: CPTII,,, | Performed by: NURSE PRACTITIONER

## 2023-09-26 PROCEDURE — 1101F PT FALLS ASSESS-DOCD LE1/YR: CPT | Mod: CPTII,,, | Performed by: NURSE PRACTITIONER

## 2023-09-26 PROCEDURE — 3060F POS MICROALBUMINURIA REV: CPT | Mod: CPTII,,, | Performed by: NURSE PRACTITIONER

## 2023-09-26 PROCEDURE — 3066F NEPHROPATHY DOC TX: CPT | Mod: CPTII,,, | Performed by: NURSE PRACTITIONER

## 2023-09-26 PROCEDURE — 1101F PR PT FALLS ASSESS DOC 0-1 FALLS W/OUT INJ PAST YR: ICD-10-PCS | Mod: CPTII,,, | Performed by: NURSE PRACTITIONER

## 2023-09-26 PROCEDURE — 99214 OFFICE O/P EST MOD 30 MIN: CPT | Mod: S$PBB,,, | Performed by: NURSE PRACTITIONER

## 2023-09-26 PROCEDURE — 90694 VACC AIIV4 NO PRSRV 0.5ML IM: CPT | Mod: PBBFAC

## 2023-09-26 PROCEDURE — 1159F MED LIST DOCD IN RCRD: CPT | Mod: CPTII,,, | Performed by: NURSE PRACTITIONER

## 2023-09-26 PROCEDURE — 3066F PR DOCUMENTATION OF TREATMENT FOR NEPHROPATHY: ICD-10-PCS | Mod: CPTII,,, | Performed by: NURSE PRACTITIONER

## 2023-09-26 PROCEDURE — 3060F PR POS MICROALBUMINURIA RESULT DOCUMENTED/REVIEW: ICD-10-PCS | Mod: CPTII,,, | Performed by: NURSE PRACTITIONER

## 2023-09-26 PROCEDURE — 1125F PR PAIN SEVERITY QUANTIFIED, PAIN PRESENT: ICD-10-PCS | Mod: CPTII,,, | Performed by: NURSE PRACTITIONER

## 2023-09-26 PROCEDURE — 1160F RVW MEDS BY RX/DR IN RCRD: CPT | Mod: CPTII,,, | Performed by: NURSE PRACTITIONER

## 2023-09-26 PROCEDURE — 1125F AMNT PAIN NOTED PAIN PRSNT: CPT | Mod: CPTII,,, | Performed by: NURSE PRACTITIONER

## 2023-09-26 PROCEDURE — 3074F SYST BP LT 130 MM HG: CPT | Mod: CPTII,,, | Performed by: NURSE PRACTITIONER

## 2023-09-26 PROCEDURE — 3008F BODY MASS INDEX DOCD: CPT | Mod: CPTII,,, | Performed by: NURSE PRACTITIONER

## 2023-09-26 PROCEDURE — 3288F FALL RISK ASSESSMENT DOCD: CPT | Mod: CPTII,,, | Performed by: NURSE PRACTITIONER

## 2023-09-26 PROCEDURE — 3078F PR MOST RECENT DIASTOLIC BLOOD PRESSURE < 80 MM HG: ICD-10-PCS | Mod: CPTII,,, | Performed by: NURSE PRACTITIONER

## 2023-09-26 PROCEDURE — 99215 OFFICE O/P EST HI 40 MIN: CPT | Mod: PBBFAC | Performed by: NURSE PRACTITIONER

## 2023-09-26 PROCEDURE — G0008 ADMIN INFLUENZA VIRUS VAC: HCPCS | Mod: PBBFAC

## 2023-09-26 PROCEDURE — 3288F PR FALLS RISK ASSESSMENT DOCUMENTED: ICD-10-PCS | Mod: CPTII,,, | Performed by: NURSE PRACTITIONER

## 2023-09-26 PROCEDURE — 3078F DIAST BP <80 MM HG: CPT | Mod: CPTII,,, | Performed by: NURSE PRACTITIONER

## 2023-09-26 PROCEDURE — 1159F PR MEDICATION LIST DOCUMENTED IN MEDICAL RECORD: ICD-10-PCS | Mod: CPTII,,, | Performed by: NURSE PRACTITIONER

## 2023-09-26 PROCEDURE — 3074F PR MOST RECENT SYSTOLIC BLOOD PRESSURE < 130 MM HG: ICD-10-PCS | Mod: CPTII,,, | Performed by: NURSE PRACTITIONER

## 2023-09-26 PROCEDURE — 99214 PR OFFICE/OUTPT VISIT, EST, LEVL IV, 30-39 MIN: ICD-10-PCS | Mod: S$PBB,,, | Performed by: NURSE PRACTITIONER

## 2023-09-26 RX ORDER — ERGOCALCIFEROL 1.25 MG/1
50000 CAPSULE ORAL
Qty: 12 CAPSULE | Refills: 2 | Status: SHIPPED | OUTPATIENT
Start: 2023-09-26 | End: 2024-01-30 | Stop reason: SDUPTHER

## 2023-09-26 RX ORDER — METHOCARBAMOL 500 MG/1
500 TABLET, FILM COATED ORAL 2 TIMES DAILY PRN
Qty: 60 TABLET | Refills: 2 | OUTPATIENT
Start: 2023-09-26 | End: 2023-11-25

## 2023-09-26 RX ORDER — PANTOPRAZOLE SODIUM 40 MG/1
40 TABLET, DELAYED RELEASE ORAL DAILY
Qty: 90 TABLET | Refills: 2 | Status: SHIPPED | OUTPATIENT
Start: 2023-09-26 | End: 2024-01-30 | Stop reason: SDUPTHER

## 2023-09-26 RX ADMIN — INFLUENZA A VIRUS A/VICTORIA/4897/2022 IVR-238 (H1N1) ANTIGEN (FORMALDEHYDE INACTIVATED), INFLUENZA A VIRUS A/DARWIN/6/2021 IVR-227 (H3N2) ANTIGEN (FORMALDEHYDE INACTIVATED), INFLUENZA B VIRUS B/AUSTRIA/1359417/2021 BVR-26 ANTIGEN (FORMALDEHYDE INACTIVATED), INFLUENZA B VIRUS B/PHUKET/3073/2013 BVR-1B ANTIGEN (FORMALDEHYDE INACTIVATED) 0.5 ML: 15; 15; 15; 15 INJECTION, SUSPENSION INTRAMUSCULAR at 12:09

## 2023-09-26 NOTE — PROGRESS NOTES
Patient ID: [id number]     Chief Complaint: Leg Pain, Shoulder Pain (Request rolator or scotter ), and Follow-up (ED follow up)    HPI:     Leatha Martinez is a 68 y.o. female with diagnosis of HTN, HLD, Hx of DVT, Hx of COVID-19, Obesity, Frequent UTIs. Patient seen in clinic today for follow up. Patient last seen in clinic on 04/04/2023.   Patient referred to ENT clinic on 04/21/2023 for thyroid nodules. Patient was a No Show to appointment scheduled for 06/13/2023. Thyroid US completed on 04/18/2023 due to abnormal TSH level; indicated Multinodular goiter, hypoechoic nodule at the right lobe measures 3 mm (TR 4), hyperechoic nodule at the isthmus measures 1.1 cm (TR 3).  TSH decreased, T3 and T4 WNL. Patient denies anxiety, SOB, palpitations, insomnia.   Patient seen in ED on 09/12/2023 for RLE and knee pain. Patient prescribed Norco, states helped with pain. Left knee X-ray completed on 03/04/2022; indicated degenerative changes. No right knee X-ray noted. Patient states difficulty ambulating, going from a sitting to standing position. Patient requesting Rolator due to difficulty ambulating due to pain and fall risk. Patient also states bilateral shoulder pain. Left shoulder X-ray completed on 04/22/2023; indicated AC and glenohumeral joint osteoarthritic changes noted, chronic ossification noted superior to the AC joint. Right Shoulder X-ray completed on 02/09/2021; indicated No evidence of acute osseous displacement, similar appearance of arthritic changes. Patient denies physical therapy, injury.   Patient seen by Urology (see below) on 01/04/2023, prescribed Keflex for recurrent UTIs, started with a rash about a week after, patient stopped taking antibiotic and rash improved. Patient allergic to PCN. Patient treated with keflex previously without complications.   Patient states SOB started after diagnosis of COVID. Currently prescribed Albuterol nebulizer, doing well.   Patient seen in clinic on 03/04/2022 for  left knee pain. Left knee X-ray indicated degenerative changes. Patient referred to Orthopedic Clinic, had appointment on 03/15/2022 but was a no show. Patient's daughter states she would like to be referred back once she has her Urology procedure. Patient's daughter states she will call when ready for new referral.   Patient denies any other acute complaints.    Patient followed by Urology Clinic. Last appointment on 08/24/2023. Recurrent cystitis: We will have her continue with the Macrodantin suppression as she is doing well on this.  She is currently asymptomatic and I think the urinalysis findings from today are due to contamination. Patient has follow up appointment scheduled for 11/27/2023.       Patient followed by Cardiology Clinic. Last appointment on 01/19/2023. Hx of HTN, Pulmonary HTN, unprovoked DVT (2014) on Xarelto, and obesity. No major changes. Remains chronically SOB after COVID. Denies chest pain. No leg swelling today. Has some right knee pain. Ha sbeen taking her meds. Says her BP is high today due to walking upstairs and being SOB. It is usually normal. Complaint with all meds. Denies any evidence of bleeding. Leatha Martinez is a 67 y.o. female with a PMH unprovoked subsegmental PE (2013), severe COVID, HTN, HLD, venous insufficiency, obesity here as a followup. From a cardiac perspective she is doing well. She tells me when she had her PE she had just been on a flight from [place]. She also states she has a family history of blood clots and lupus. She apparently had testing done for hypercoagulability previously. Continue xarelto 20mg daily for prior PE. Would evaluate causes of microcytic anemia. Continue amlodipine 10mg daily and lasix 20mg daily. Continue simvastatin 20mg daily. 1 year followup.     Review of patient's allergies indicates:   Allergen Reactions    Penicillins Hives    Aspirin Rash    Keflex [cephalexin] Rash    Tramadol Rash     Breast Cancer Screening: MMG negative on  2022, reordered  Cervical Cancer Screening: deferred due to age  Colorectal Cancer Screening:  Colonoscopy on 2022, recommend repeat in 3 years  Diabetic Eye Exam: N/A  Diabetic Foot Exam: N/A  Lung Cancer Screening: N/A  Prostate Cancer Screening: N/A  AAA Screening: deferred due to age  Osteoporosis Screenin2020, osteopenia  Medicare Wellness: ordered  Immunizations:   Immunization History   Administered Date(s) Administered    Influenza (FLUAD) - Quadrivalent - Adjuvanted - PF *Preferred* (65+) 10/04/2022, 2023    Tdap 2017     Past Surgical History:   Procedure Laterality Date    COLONOSCOPY  2022     family history includes Diabetes in her father; Heart attack in her father and mother; Thyroid disease in her father and mother.    Social History     Socioeconomic History    Marital status: Single    Number of children: 3   Tobacco Use    Smoking status: Never     Passive exposure: Current    Smokeless tobacco: Never   Substance and Sexual Activity    Alcohol use: Not Currently    Drug use: Never    Sexual activity: Not Currently     Partners: Male     Social Determinants of Health     Financial Resource Strain: Medium Risk (2023)    Overall Financial Resource Strain (CARDIA)     Difficulty of Paying Living Expenses: Somewhat hard   Food Insecurity: No Food Insecurity (2023)    Hunger Vital Sign     Worried About Running Out of Food in the Last Year: Never true     Ran Out of Food in the Last Year: Never true   Transportation Needs: Unmet Transportation Needs (2023)    PRAPARE - Transportation     Lack of Transportation (Medical): Yes     Lack of Transportation (Non-Medical): Yes   Physical Activity: Unknown (2023)    Exercise Vital Sign     Days of Exercise per Week: 3 days   Stress: Stress Concern Present (2023)    [employer] [place] [employer] Occupational [employer] - Occupational Stress Questionnaire     Feeling of Stress : To some extent   Social Connections:  "Unknown (4/4/2023)    Social Connection and Isolation Panel [NHANES]     Frequency of Communication with Friends and Family: Three times a week     Frequency of Social Gatherings with Friends and Family: Three times a week     Attends Mu-ism Services: 1 to 4 times per year     Active Member of Clubs or Organizations: No     Attends Club or Organization Meetings: Never   Housing Stability: Low Risk  (4/4/2023)    Housing Stability Vital Sign     Unable to Pay for Housing in the Last Year: No     Number of Places Lived in the Last Year: 1     Unstable Housing in the Last Year: No     Current Outpatient Medications   Medication Instructions    albuterol (VENTOLIN HFA) 90 mcg/actuation inhaler 2 puffs, Inhalation, Every 6 hours PRN, Rescue    amLODIPine (NORVASC) 10 mg, Oral, Daily    ergocalciferol (ERGOCALCIFEROL) 50,000 Units, Oral, Every 7 days    furosemide (LASIX) 20 mg, Oral, Daily    methocarbamoL (ROBAXIN) 500 mg, Oral, 2 times daily PRN    nitrofurantoin (MACRODANTIN) 50 mg, Oral, Daily    pantoprazole (PROTONIX) 40 mg, Oral, Daily    rivaroxaban (XARELTO) 20 mg, Oral, With dinner    simvastatin (ZOCOR) 20 mg, Oral, Nightly    triamcinolone acetonide 0.1% (KENALOG) 0.1 % cream Topical (Top), 2 times daily       Subjective:     Review of Systems   Constitutional: Negative.    HENT: Negative.     Eyes: Negative.    Respiratory: Negative.     Cardiovascular: Negative.    Gastrointestinal: Negative.    Endocrine: Negative.    Genitourinary: Negative.    Musculoskeletal:  Positive for arthralgias.   Skin: Negative.    Allergic/Immunologic: Negative.    Neurological: Negative.    Hematological: Negative.    Psychiatric/Behavioral: Negative.         Objective:     Visit Vitals  /74 (BP Location: Left forearm, Patient Position: Sitting, BP Method: Large (Automatic))   Pulse 102   Temp 98.7 °F (37.1 °C) (Oral)   Resp 18   Ht 5' 5.98" (1.676 m)   Wt 122.4 kg (269 lb 12.8 oz)   BMI 43.57 kg/m²       Physical " Exam  Vitals reviewed.   Constitutional:       Appearance: Normal appearance. She is obese.   HENT:      Head: Normocephalic and atraumatic.      Mouth/Throat:      Mouth: Mucous membranes are moist.      Pharynx: Oropharynx is clear.   Eyes:      Extraocular Movements: Extraocular movements intact.      Conjunctiva/sclera: Conjunctivae normal.      Pupils: Pupils are equal, round, and reactive to light.   Cardiovascular:      Rate and Rhythm: Normal rate and regular rhythm.      Heart sounds: Normal heart sounds.   Pulmonary:      Effort: Pulmonary effort is normal.      Breath sounds: Normal breath sounds.   Abdominal:      General: Bowel sounds are normal.   Musculoskeletal:      Right shoulder: No swelling, deformity or tenderness. Decreased range of motion.      Left shoulder: No swelling, deformity or tenderness. Decreased range of motion.      Cervical back: Normal range of motion.      Right knee: No swelling or deformity. Normal range of motion. Tenderness present.      Left knee: No swelling or deformity. Normal range of motion. Tenderness present.   Skin:     General: Skin is warm and dry.   Neurological:      Mental Status: She is alert and oriented to person, place, and time.      Gait: Gait abnormal.      Comments: Ambulates with limp, ambulating with rolator   Psychiatric:         Mood and Affect: Mood normal.         Behavior: Behavior normal.       Labs Reviewed:     Hematology:  Lab Results   Component Value Date    WBC 7.47 09/12/2023    HGB 10.1 (L) 09/12/2023    HCT 33.1 (L) 09/12/2023     09/12/2023     Chemistry:  Lab Results   Component Value Date     09/12/2023    K 3.8 09/12/2023    CHLORIDE 107 09/12/2023    BUN 11.2 09/12/2023    CREATININE 0.73 09/12/2023    EGFRNORACEVR >60 09/12/2023    GLUCOSE 90 09/12/2023    CALCIUM 8.9 09/12/2023    ALKPHOS 132 09/12/2023    LABPROT 7.7 (H) 09/12/2023    ALBUMIN 3.0 (L) 09/12/2023    BILIDIR 0.2 03/15/2022    IBILI 0.10 03/15/2022     AST 16 09/12/2023    ALT 12 09/12/2023    MG 1.90 07/29/2023    PHOS 3.5 08/22/2021    PXNBEJCC51PB 12.1 (L) 10/04/2022     Lab Results   Component Value Date    HGBA1C 6.3 10/04/2022     Lipid Panel:  Lab Results   Component Value Date    CHOL 137 03/31/2023    HDL 32 (L) 03/31/2023    LDL 86.00 03/31/2023    TRIG 93 03/31/2023    TOTALCHOLEST 4 03/31/2023     Thyroid:  Lab Results   Component Value Date    TSH 0.085 (L) 09/26/2023    T3FREE 3.28 03/31/2023     Urine:  Lab Results   Component Value Date    COLORUA Light-Yellow 03/31/2023    APPEARANCEUA Clear 03/31/2023    SGUA 1.012 03/31/2023    PHUA 5.5 03/31/2023    PROTEINUA Negative 03/31/2023    GLUCOSEUA Normal 03/31/2023    KETONESUA Negative 03/31/2023    BLOODUA Negative 03/31/2023    NITRITESUA Negative 03/31/2023    LEUKOCYTESUR 250 (A) 03/31/2023    RBCUA 0-5 03/31/2023    WBCUA 11-20 (A) 03/31/2023    BACTERIA Occ (A) 03/31/2023    SQEPUA Few (A) 03/31/2023    HYALINECASTS 0-2 (A) 03/31/2023    CREATRANDUR 87.5 03/31/2023        Assessment:       ICD-10-CM ICD-9-CM   1. Abnormal TSH  R79.89 790.6   2. Thyroid nodule  E04.1 241.0   3. Chronic right shoulder pain  M25.511 719.41    G89.29 338.29   4. Osteoarthritis of both knees, unspecified osteoarthritis type  M17.0 715.96   5. Primary hypertension  I10 401.9   6. Other hyperlipidemia  E78.49 272.4   7. Class 3 severe obesity due to excess calories with serious comorbidity and body mass index (BMI) of 40.0 to 44.9 in adult  E66.01 278.01    Z68.41 V85.41   8. Needs flu shot  Z23 V04.81   9. Post-menopause  Z78.0 V49.81   10. Risk for falls  Z91.81 V15.88        Plan:     1. Abnormal TSH  TSH level: pending  Referral to ENT ordered again  - Ambulatory referral/consult to ENT; Future    2. Thyroid nodule  Repeat TSH level ordered  Referral to ENT ordered again  - TSH; Future  - Ambulatory referral/consult to ENT; Future    3. Chronic right shoulder pain  Referral to PT and Ortho ordered  Left and right  shoulder X-ray completed (see HPI)  Start Robaxin 500 mg bid  - Ambulatory referral/consult to Orthopedics; Future  - Ambulatory referral/consult to Physical/Occupational Therapy; Future    4. Osteoarthritis of both knees, unspecified osteoarthritis type  Bilateral knee X-ray ordered  Referral to PT and Ortho ordered  Start Robaxin 500 mg bid  - WALKER FOR HOME USE  - X-Ray Knee 1 or 2 View Bilateral; Future  - Ambulatory referral/consult to Orthopedics; Future  - Ambulatory referral/consult to Physical/Occupational Therapy; Future    5. Primary hypertension  Vitals:    09/26/23 1217   BP: 121/74   Pulse: 102   Resp: 18   Temp: 98.7 °F (37.1 °C)      Results for orders placed or performed in visit on 03/31/23   Microalbumin/Creatinine Ratio, Urine   Result Value Ref Range    Urine Microalbumin 38.5 (H) <=30.0 ug/ml    Urine Creatinine 87.5 47.0 - 110.0 mg/dL    Microalbumin Creatinine Ratio 44.0 (H) 0.0 - 30.0 mg/gm Cr     Results for orders placed or performed in visit on 01/19/23   IN OFFICE EKG 12-LEAD (to [place])    Collection Time: 01/19/23 11:19 AM    Narrative    Test Reason : I10,R06.09,U09.9,E66.01,Z68.42,    Vent. Rate : 075 BPM     Atrial Rate : 075 BPM     P-R Int : 140 ms          QRS Dur : 072 ms      QT Int : 392 ms       P-R-T Axes : 061 -07 -06 degrees     QTc Int : 437 ms    Normal sinus rhythm  Minimal voltage criteria for LVH, may be normal variant  Cannot rule out Anterior infarct ,age undetermined  T wave abnormality, consider lateral ischemia  Abnormal ECG  No previous ECGs available  Confirmed by Deuce Sy MD [id number]) on 1/19/2023 2:01:49 PM    Referred By:             Confirmed By:Deuce Sy MD   Continue Amlodipine 10 mg daily, Lasix 20 mg daily  DASH diet  Encouraged home blood pressure monitoring    6. Other hyperlipidemia  Continue Simvastatin 20 mg daily  Weight loss encouraged  Low fat/high fiber diet  Increase physical activity  Tobacco cessation encouraged  Cholesterol Total    Date Value Ref Range Status   03/31/2023 137 <=200 mg/dL Final     HDL Cholesterol   Date Value Ref Range Status   03/31/2023 32 (L) 35 - 60 mg/dL Final     Triglyceride   Date Value Ref Range Status   03/31/2023 93 37 - 140 mg/dL Final     LDL Cholesterol   Date Value Ref Range Status   03/31/2023 86.00 50.00 - 140.00 mg/dL Final     7. Class 3 severe obesity due to excess calories with serious comorbidity and body mass index (BMI) of 40.0 to 44.9 in adult  Body mass index is 43.57 kg/m².  Thyroid Stimulating Hormone   Date Value Ref Range Status   09/26/2023 0.085 (L) 0.350 - 4.940 uIU/mL Final     Vit D 25 OH   Date Value Ref Range Status   10/04/2022 12.1 (L) 30.0 - 80.0 ng/mL Final     Hemoglobin A1c   Date Value Ref Range Status   10/04/2022 6.3 <=7.0 % Final   Sleep Study: none noted  Weight Loss Encouraged  Increase Physical Activity    8. Needs flu shot  - influenza 65up-adj (QUADRIVALENT ADJUVANTED PF) vaccine 0.5 mL    9. Post-menopause  - DXA Bone Density Axial Skeleton 1 or more sites; Future    10. Risk for falls  - WALKER FOR HOME USE        1. Abnormal TSH  TSH: 0.122  T3: 3.28  T4: 0.91  - US Thyroid; Future    2. Acute cystitis without hematuria  Start Ciprofloxacin 500 mg bid x7 days  Urology appt 05/18/2023    3. Primary hypertension  Vitals:    09/26/23 1217   BP: 121/74   Pulse: 102   Resp: 18   Temp: 98.7 °F (37.1 °C)        Urine Creatinine   Date Value Ref Range Status   03/31/2023 87.5 47.0 - 110.0 mg/dL Final   Urine Microalbumin: 38.5  Results for orders placed or performed in visit on 01/19/23   IN OFFICE EKG 12-LEAD (to [place])    Collection Time: 01/19/23 11:19 AM    Narrative    Test Reason : I10,R06.09,U09.9,E66.01,Z68.42,    Vent. Rate : 075 BPM     Atrial Rate : 075 BPM     P-R Int : 140 ms          QRS Dur : 072 ms      QT Int : 392 ms       P-R-T Axes : 061 -07 -06 degrees     QTc Int : 437 ms    Normal sinus rhythm  Minimal voltage criteria for LVH, may be normal variant  Cannot  rule out Anterior infarct ,age undetermined  T wave abnormality, consider lateral ischemia  Abnormal ECG  No previous ECGs available  Confirmed by Deuce Sy MD ([id number]) on 1/19/2023 2:01:49 PM    Referred By:             Confirmed By:Deuce Sy MD   Continue   DASH diet  Encouraged home blood pressure monitoring    4. Other hyperlipidemia  Continue Simvastatin 10 mg daily  Weight loss encouraged  Low fat/high fiber diet  Increase physical activity  Cholesterol Total   Date Value Ref Range Status   03/31/2023 137 <=200 mg/dL Final     HDL Cholesterol   Date Value Ref Range Status   03/31/2023 32 (L) 35 - 60 mg/dL Final     Triglyceride   Date Value Ref Range Status   03/31/2023 93 37 - 140 mg/dL Final     LDL Cholesterol   Date Value Ref Range Status   03/31/2023 86.00 50.00 - 140.00 mg/dL Final     5. Gastro-esophageal reflux disease without esophagitis  Continue Pantoprazole 40 mg daily  Avoid spicy foods  Remain in an upright position for at least 30 minutes after consuming meals        Follow up in about 4 months (around 1/26/2024) for Labs, Virtual Visit. In addition to their scheduled follow up, the patient has also been instructed to follow up on as needed basis.     Tiffany Harris, RENU

## 2023-09-29 ENCOUNTER — HOSPITAL ENCOUNTER (OUTPATIENT)
Dept: RADIOLOGY | Facility: HOSPITAL | Age: 68
Discharge: HOME | End: 2023-09-29
Attending: NURSE PRACTITIONER
Payer: MEDICARE

## 2023-09-29 ENCOUNTER — PATIENT MESSAGE (OUTPATIENT)
Dept: INTERNAL MEDICINE | Facility: CLINIC | Age: 68
End: 2023-09-29
Payer: MEDICARE

## 2023-09-29 DIAGNOSIS — Z78.0 POST-MENOPAUSE: ICD-10-CM

## 2023-09-29 PROCEDURE — 77080 DXA BONE DENSITY AXIAL: CPT | Mod: TC

## 2023-10-07 ENCOUNTER — HOSPITAL ENCOUNTER (EMERGENCY)
Facility: HOSPITAL | Age: 68
Discharge: HOME | End: 2023-10-08
Attending: EMERGENCY MEDICINE
Payer: MEDICARE

## 2023-10-07 DIAGNOSIS — G89.29 CHRONIC RIGHT-SIDED LOW BACK PAIN WITH RIGHT-SIDED SCIATICA: Primary | ICD-10-CM

## 2023-10-07 DIAGNOSIS — M54.41 CHRONIC RIGHT-SIDED LOW BACK PAIN WITH RIGHT-SIDED SCIATICA: Primary | ICD-10-CM

## 2023-10-07 PROCEDURE — 99283 EMERGENCY DEPT VISIT LOW MDM: CPT

## 2023-10-07 PROCEDURE — 25000003 PHARM REV CODE 250: Performed by: NURSE PRACTITIONER

## 2023-10-07 RX ORDER — GABAPENTIN 100 MG/1
100 CAPSULE ORAL NIGHTLY
Qty: 15 CAPSULE | Refills: 0 | OUTPATIENT
Start: 2023-10-07 | End: 2023-11-25

## 2023-10-07 RX ORDER — HYDROCODONE BITARTRATE AND ACETAMINOPHEN 5; 325 MG/1; MG/1
1 TABLET ORAL
Status: COMPLETED | OUTPATIENT
Start: 2023-10-07 | End: 2023-10-07

## 2023-10-07 RX ADMIN — HYDROCODONE BITARTRATE AND ACETAMINOPHEN 1 TABLET: 5; 325 TABLET ORAL at 11:10

## 2023-10-08 VITALS
SYSTOLIC BLOOD PRESSURE: 143 MMHG | HEIGHT: 66 IN | RESPIRATION RATE: 17 BRPM | DIASTOLIC BLOOD PRESSURE: 72 MMHG | WEIGHT: 273 LBS | OXYGEN SATURATION: 100 % | BODY MASS INDEX: 43.87 KG/M2 | TEMPERATURE: 99 F | HEART RATE: 65 BPM

## 2023-10-08 NOTE — ED PROVIDER NOTES
Encounter Date: 10/7/2023       History     Chief Complaint   Patient presents with    Back Pain    Leg Pain     Pt states bilateral leg and back pain for months. Denies trauma. Ambulates with assistance of a walker at this time. Vss. nadn     The patient presents with low back pain.  The onset was chronic.  The course/duration of symptoms is constant.  Type of injury: none and none recent, denies falls.  Location: Right lumbar. Radiating pain: right lower extremity. The character of symptoms is dull.  The degree at onset was moderate.  The degree at present is moderate.  There are exacerbating factors including movement and changing position.  The relieving factor is rest.  Risk factors consist of age.  Prior episodes: chronic.  Therapy today: none.  Associated symptoms: none, denies bowel dysfunction, denies bladder dysfunction, denies altered sensation, denies focal weakness, denies saddle numbness, denies abdominal pain and denies dysuria.  She reports chronic sciatica requests pain medication. She is currently prescribed robaxin. She has a history of dvt and is taking xarelto.            Review of patient's allergies indicates:   Allergen Reactions    Penicillins Hives    Aspirin Rash    Keflex [cephalexin] Rash    Tramadol Rash     Past Medical History:   Diagnosis Date    Hyperlipidemia     Hypertension     Personal history of colonic polyps 04/14/2022     Past Surgical History:   Procedure Laterality Date    COLONOSCOPY  04/14/2022     Family History   Problem Relation Age of Onset    Heart attack Mother     Thyroid disease Mother     Diabetes Father     Thyroid disease Father     Heart attack Father      Social History     Tobacco Use    Smoking status: Never     Passive exposure: Current    Smokeless tobacco: Never   Substance Use Topics    Alcohol use: Not Currently    Drug use: Never     Review of Systems   Constitutional:  Negative for fever.   HENT:  Negative for sore throat.    Respiratory:  Negative  for shortness of breath.    Cardiovascular:  Negative for chest pain.   Gastrointestinal:  Negative for nausea.   Genitourinary:  Negative for dysuria.   Musculoskeletal:  Positive for back pain.   Skin:  Negative for rash.   Neurological:  Negative for weakness.   Hematological:  Does not bruise/bleed easily.   All other systems reviewed and are negative.      Physical Exam     Initial Vitals [10/07/23 2243]   BP Pulse Resp Temp SpO2   (!) 159/69 60 18 98 °F (36.7 °C) 98 %      MAP       --         Physical Exam    Nursing note and vitals reviewed.  Constitutional: She appears well-developed and well-nourished.   HENT:   Head: Normocephalic and atraumatic.   Neck: Neck supple.   Normal range of motion.  Cardiovascular:  Normal rate, regular rhythm, normal heart sounds and intact distal pulses.           Pulmonary/Chest: Breath sounds normal.   Abdominal: Abdomen is soft. Bowel sounds are normal.   Musculoskeletal:         General: Normal range of motion.      Cervical back: Normal range of motion and neck supple.      Comments: No costovertebral angle tenderness, , Thoracic: no vertebral point tenderness, Lumbar: Right lateral mild tenderness, midline negative, no vertebral point tenderness, Sacral: no vertebral point tenderness, Testing: Straight leg raising, supine negative.  No C/T/L point tenderness. normal reflexes.     Neurological: She is alert. She has normal strength.   Skin: Skin is warm and dry.   Psychiatric: She has a normal mood and affect.         ED Course   Procedures  Labs Reviewed - No data to display       Imaging Results    None          Medications   HYDROcodone-acetaminophen 5-325 mg per tablet 1 tablet (1 tablet Oral Given 10/7/23 2330)     Medical Decision Making  The patient presents with low back pain.  The onset was chronic.  The course/duration of symptoms is constant.  Type of injury: none and none recent, denies falls.  Location: Right lumbar. Radiating pain: right lower extremity.  The character of symptoms is dull.  The degree at onset was moderate.  The degree at present is moderate.  There are exacerbating factors including movement and changing position.  The relieving factor is rest.  Risk factors consist of age.  Prior episodes: chronic.  Therapy today: none.  Associated symptoms: none, denies bowel dysfunction, denies bladder dysfunction, denies altered sensation, denies focal weakness, denies saddle numbness, denies abdominal pain and denies dysuria.  She reports chronic sciatica requests pain medication. She is currently prescribed robaxin. She has a history of dvt and is taking xarelto.    11:45 PM DISPOSITION: The patient is resting comfortably in no acute distress.  She is hemodynamically stable and is without objective evidence for acute process requiring urgent intervention or hospitalization. I provided counseling to patient with regard to condition, the treatment plan, specific conditions for return, and the importance of follow up. Detailed written and verbal instructions provided to patient and she expressed a verbal understanding of the discharge instructions and overall management plan. Reiterated the importance of medication administration and safety and advised patient to follow up with primary care provider in 3-5 days or sooner if needed.  Answered questions at this time. The patient is stable for discharge.         Risk  Prescription drug management.      Additional MDM:   Differential Diagnosis:   Lumbar fracture, spinal stenosis, epidural abscess, spine osteomyelitis, MS, cauda equina, among others                              Clinical Impression:   Final diagnoses:  [M54.41, G89.29] Chronic right-sided low back pain with right-sided sciatica (Primary)        ED Disposition Condition    Discharge Stable          ED Prescriptions       Medication Sig Dispense Start Date End Date Auth. Provider    gabapentin (NEURONTIN) 100 MG capsule Take 1 capsule (100 mg total) by  mouth every evening. for 15 days 15 capsule 10/7/2023 10/22/2023 Kameron Moore ACNP          Follow-up Information       Follow up With Specialties Details Why Contact Info    Tiffany Harris, [facility] In 3 days  [address] [facility] [address]  [phone]      [facility] Emergency Medicine  If symptoms worsen [address] [facility] [id number] [phone]             Kameron Moore ACNP  10/07/23 0267

## 2023-10-13 ENCOUNTER — TELEPHONE (OUTPATIENT)
Dept: INTERNAL MEDICINE | Facility: CLINIC | Age: 68
End: 2023-10-13
Payer: MEDICARE

## 2023-10-13 NOTE — TELEPHONE ENCOUNTER
The daughter is request can order for the walker be sent somewhere,because [facility] is out network with the pt insurance     PLEASE ADVISE

## 2023-10-13 NOTE — TELEPHONE ENCOUNTER
Company reach out to the clinic to received added informed on the pt. With consent of the pt's daughter on the phone as well to confirmed it is ok to give the information out so the pt can receive a  wheel chair

## 2023-10-20 ENCOUNTER — OFFICE VISIT (OUTPATIENT)
Dept: OTOLARYNGOLOGY | Facility: CLINIC | Age: 68
End: 2023-10-20
Payer: MEDICARE

## 2023-10-20 DIAGNOSIS — E04.1 THYROID NODULE: ICD-10-CM

## 2023-10-20 DIAGNOSIS — R79.89 ABNORMAL TSH: ICD-10-CM

## 2023-10-20 PROCEDURE — 3066F NEPHROPATHY DOC TX: CPT | Mod: CPTII,95,, | Performed by: NURSE PRACTITIONER

## 2023-10-20 PROCEDURE — 3288F PR FALLS RISK ASSESSMENT DOCUMENTED: ICD-10-PCS | Mod: CPTII,95,, | Performed by: NURSE PRACTITIONER

## 2023-10-20 PROCEDURE — 1159F PR MEDICATION LIST DOCUMENTED IN MEDICAL RECORD: ICD-10-PCS | Mod: CPTII,95,, | Performed by: NURSE PRACTITIONER

## 2023-10-20 PROCEDURE — 3288F FALL RISK ASSESSMENT DOCD: CPT | Mod: CPTII,95,, | Performed by: NURSE PRACTITIONER

## 2023-10-20 PROCEDURE — 99442 PR PHYSICIAN TELEPHONE EVALUATION 11-20 MIN: CPT | Mod: 95,,, | Performed by: NURSE PRACTITIONER

## 2023-10-20 PROCEDURE — 3066F PR DOCUMENTATION OF TREATMENT FOR NEPHROPATHY: ICD-10-PCS | Mod: CPTII,95,, | Performed by: NURSE PRACTITIONER

## 2023-10-20 PROCEDURE — 99442 PR PHYSICIAN TELEPHONE EVALUATION 11-20 MIN: ICD-10-PCS | Mod: 95,,, | Performed by: NURSE PRACTITIONER

## 2023-10-20 PROCEDURE — 3060F PR POS MICROALBUMINURIA RESULT DOCUMENTED/REVIEW: ICD-10-PCS | Mod: CPTII,95,, | Performed by: NURSE PRACTITIONER

## 2023-10-20 PROCEDURE — 1159F MED LIST DOCD IN RCRD: CPT | Mod: CPTII,95,, | Performed by: NURSE PRACTITIONER

## 2023-10-20 PROCEDURE — 1101F PR PT FALLS ASSESS DOC 0-1 FALLS W/OUT INJ PAST YR: ICD-10-PCS | Mod: CPTII,95,, | Performed by: NURSE PRACTITIONER

## 2023-10-20 PROCEDURE — 3060F POS MICROALBUMINURIA REV: CPT | Mod: CPTII,95,, | Performed by: NURSE PRACTITIONER

## 2023-10-20 PROCEDURE — 1101F PT FALLS ASSESS-DOCD LE1/YR: CPT | Mod: CPTII,95,, | Performed by: NURSE PRACTITIONER

## 2023-10-20 NOTE — PROGRESS NOTES
Audio Only Telehealth Visit     The patient location is: state [facility]  The chief complaint leading to consultation is: thyroid nodules  Visit type: Virtual visit with audio only (telephone)  Total time spent on visit: 12 min     The reason for the audio only service rather than synchronous audio and video virtual visit was related to technical difficulties or patient preference/necessity.     Each patient to whom I provide medical services by telemedicine is:  (1) informed of the relationship between the physician and patient and the respective role of any other health care provider with respect to management of the patient; and (2) notified that they may decline to receive medical services by telemedicine and may withdraw from such care at any time. Patient verbally consented to receive this service via voice-only telephone call.       HPI: 68yoF referred for thyroid nodules. She denies dysphonia or dysphagia. States she occasionally has a little SOB if she lies flat. Reports that her mother had thyroid problems but not cancer, she is unsure what exactly. Denies radiation exposure. Denies anxiety, wt loss, hair loss, abnormal heart rate/rhythm.     Labs:       Imaging:        Assessment and plan: 68yoF referred for thyroid nodules, none meeting FNA or surveillance criteria. Pt is asymptomatic for hyperthyroid symptoms but, if those develop, would recommend referral to endocrinology.  - RTC prn    Gisela Brown NP        This service was not originating from a related E/M service provided within the previous 7 days nor will  to an E/M service or procedure within the next 24 hours or my soonest available appointment.  Prevailing standard of care was able to be met in this audio-only visit.

## 2023-10-22 ENCOUNTER — HOSPITAL ENCOUNTER (EMERGENCY)
Facility: HOSPITAL | Age: 68
Discharge: HOME | End: 2023-10-23
Attending: STUDENT IN AN ORGANIZED HEALTH CARE EDUCATION/TRAINING PROGRAM
Payer: MEDICARE

## 2023-10-22 VITALS
WEIGHT: 273 LBS | TEMPERATURE: 98 F | RESPIRATION RATE: 18 BRPM | DIASTOLIC BLOOD PRESSURE: 68 MMHG | HEART RATE: 78 BPM | BODY MASS INDEX: 43.87 KG/M2 | HEIGHT: 66 IN | OXYGEN SATURATION: 98 % | SYSTOLIC BLOOD PRESSURE: 151 MMHG

## 2023-10-22 DIAGNOSIS — G89.29 CHRONIC BILATERAL LOW BACK PAIN WITH BILATERAL SCIATICA: Primary | ICD-10-CM

## 2023-10-22 DIAGNOSIS — M54.42 CHRONIC BILATERAL LOW BACK PAIN WITH BILATERAL SCIATICA: Primary | ICD-10-CM

## 2023-10-22 DIAGNOSIS — M54.41 CHRONIC BILATERAL LOW BACK PAIN WITH BILATERAL SCIATICA: Primary | ICD-10-CM

## 2023-10-22 PROCEDURE — 99285 EMERGENCY DEPT VISIT HI MDM: CPT | Mod: 25

## 2023-10-23 PROCEDURE — 63600175 PHARM REV CODE 636 W HCPCS: Performed by: STUDENT IN AN ORGANIZED HEALTH CARE EDUCATION/TRAINING PROGRAM

## 2023-10-23 PROCEDURE — 25000003 PHARM REV CODE 250: Performed by: STUDENT IN AN ORGANIZED HEALTH CARE EDUCATION/TRAINING PROGRAM

## 2023-10-23 PROCEDURE — 96372 THER/PROPH/DIAG INJ SC/IM: CPT | Performed by: STUDENT IN AN ORGANIZED HEALTH CARE EDUCATION/TRAINING PROGRAM

## 2023-10-23 RX ORDER — METHOCARBAMOL 500 MG/1
1000 TABLET, FILM COATED ORAL 3 TIMES DAILY PRN
Qty: 30 TABLET | Refills: 0 | Status: SHIPPED | OUTPATIENT
Start: 2023-10-23 | End: 2023-10-28

## 2023-10-23 RX ORDER — IBUPROFEN 600 MG/1
600 TABLET ORAL EVERY 6 HOURS PRN
Qty: 20 TABLET | Refills: 0 | Status: SHIPPED | OUTPATIENT
Start: 2023-10-23 | End: 2024-01-30

## 2023-10-23 RX ORDER — LIDOCAINE 50 MG/G
1 PATCH TOPICAL DAILY
Qty: 7 PATCH | Refills: 0 | Status: SHIPPED | OUTPATIENT
Start: 2023-10-23 | End: 2023-10-30

## 2023-10-23 RX ORDER — HYDROCODONE BITARTRATE AND ACETAMINOPHEN 5; 325 MG/1; MG/1
1 TABLET ORAL EVERY 6 HOURS PRN
Qty: 12 TABLET | Refills: 0 | OUTPATIENT
Start: 2023-10-23 | End: 2023-11-05

## 2023-10-23 RX ORDER — ORPHENADRINE CITRATE 30 MG/ML
60 INJECTION INTRAMUSCULAR; INTRAVENOUS
Status: COMPLETED | OUTPATIENT
Start: 2023-10-23 | End: 2023-10-23

## 2023-10-23 RX ORDER — METHOCARBAMOL 500 MG/1
500 TABLET, FILM COATED ORAL
Status: COMPLETED | OUTPATIENT
Start: 2023-10-23 | End: 2023-10-23

## 2023-10-23 RX ORDER — KETOROLAC TROMETHAMINE 30 MG/ML
60 INJECTION, SOLUTION INTRAMUSCULAR; INTRAVENOUS
Status: COMPLETED | OUTPATIENT
Start: 2023-10-23 | End: 2023-10-23

## 2023-10-23 RX ADMIN — METHOCARBAMOL 500 MG: 500 TABLET ORAL at 01:10

## 2023-10-23 RX ADMIN — ORPHENADRINE CITRATE 60 MG: 60 INJECTION INTRAMUSCULAR; INTRAVENOUS at 02:10

## 2023-10-23 RX ADMIN — KETOROLAC TROMETHAMINE 60 MG: 60 INJECTION, SOLUTION INTRAMUSCULAR at 01:10

## 2023-10-23 NOTE — DISCHARGE INSTRUCTIONS
Thanks for letting use take care of you today! It is our goal to give you courteous care and to keep you comfortable and informed. If you have any questions before you leave I will be happy to try and answer them.     Advice after your visit:  Your visit in the emergency department is NOT definitive care - please follow-up with your primary care doctor and/or specialist within 1-2 days. If you do not have a primary care physician call [phone] to schedule an appointment. Please return if you have any worsening in your condition or if you have any other concerns.    Return to the emergency department if any worsening symptoms including fever, chest pain, difficulty breathing, weakness, numbness, tingling, nausea, vomiting, inability to eat, drink or take your medication, or any other new symptoms or concerns arise.      Please signup for MyChart as noted below in your paperwork to review all labwork, imaging results, and any other incidental findings from today's visit.     If you had radiology exams like an XRAY or CT in the emergency Department the interpreation on them may be preliminary - there may be less time sensitive findings on the reports please obtain these reports within 24 hours from the hospital or by using your out on your mobile phone to access records.  Bring these to your primary care doctor and/or specialist for further review of incidental findings.    Please review any LAB WORK from your visit today with your primary care physician.    If you were prescribed OPIATE PAIN MEDICATION - please understand of these medications can be addictive, you may fill less of the prescription was written for, you do not have to take the full prescription.  You may discard what you do not use.  Please seek help if you feel you are having problems with addiction.  Do not drive or operate heavy machinery if you are taking sedating medications.  Do not mix these medications with alcohol.      If you had a SPLINT  placed in the emergency department if you have severe pain numbness tingling or discoloration of year digits please remove the splint and return to the emergency department for further evaluation as this may represent a sign of compromise to the nerves or blood vessels due to swelling.    If you had SUTURES in the emergency department please have them removed in the prescribed time frame typically within 7-14 days.  You may shower but please do not bathe or swim.  Keep the wounds clean and dry and covered with a clean dressing.  Please return if he have any signs of infection like redness or drainage or pain at the suture site.    Please take the full course of  any ANTIBIOTICS you were prescribed - incomplete courses of antibiotics can cause resistance to antibiotics in the future which will make it difficult to treat any infections you may have.

## 2023-10-23 NOTE — ED PROVIDER NOTES
Encounter Date: 10/22/2023    SCRIBE #1 NOTE: I, Beto Rinaldi, am scribing for, and in the presence of,  Devante Beltre IV, MD. I have scribed the following portions of the note - Other sections scribed: HPI, ROS, PE.       History     Chief Complaint   Patient presents with    Back Pain     Pt reporting back pain that radiates to bilateral legs x6 months, reporting tingling to both legs, denied trouble urinating or BM or trauma to area. Has been seen multiple times and had an appointment with neurologist that had to be rescheduled.     68 year old female with pmhx of HTN and HLD presents to ED c/o back pain onset 2 months ago. Pt states that back pain feels like stabbing and radiates to bilateral legs down to her toes. Pt denies weakness to her legs, numbness, bowel and bladder incontinence, and injury/trauma. Pt states that she ran out of norco about 1 week ago, prescribed for sciatic, and that back pain has been worsening since.    Re-eval of pt at 0230: pain is completely resolved, eating potato chips in no pain, and is amenable of plan to discharge    The history is provided by the patient. No  was used.     Review of patient's allergies indicates:   Allergen Reactions    Penicillins Hives    Aspirin Rash    Keflex [cephalexin] Rash    Tramadol Rash     Past Medical History:   Diagnosis Date    Hyperlipidemia     Hypertension     Personal history of colonic polyps 04/14/2022     Past Surgical History:   Procedure Laterality Date    COLONOSCOPY  04/14/2022     Family History   Problem Relation Age of Onset    Heart attack Mother     Thyroid disease Mother     Diabetes Father     Thyroid disease Father     Heart attack Father      Social History     Tobacco Use    Smoking status: Never     Passive exposure: Current    Smokeless tobacco: Never   Substance Use Topics    Alcohol use: Not Currently    Drug use: Never     Review of Systems   Constitutional:  Negative for chills and fever.    HENT:  Negative for congestion, rhinorrhea and sore throat.    Eyes:  Negative for visual disturbance.   Respiratory:  Negative for cough and shortness of breath.    Cardiovascular:  Negative for chest pain and leg swelling.   Gastrointestinal:  Negative for abdominal pain, nausea and vomiting.   Genitourinary:  Negative for dysuria, hematuria, vaginal bleeding and vaginal discharge.   Musculoskeletal:  Positive for back pain. Negative for joint swelling.        Bilateral leg pain   Skin:  Negative for rash.   Neurological:  Negative for weakness and numbness.   Psychiatric/Behavioral:  Negative for confusion.        Physical Exam     Initial Vitals [10/22/23 2129]   BP Pulse Resp Temp SpO2   (!) 151/68 78 18 97.5 °F (36.4 °C) 98 %      MAP       --         Physical Exam    Nursing note and vitals reviewed.  Constitutional: She is not diaphoretic. No distress.   tearful   Cardiovascular:  Normal rate and regular rhythm.           No murmur heard.  Pulmonary/Chest: Breath sounds normal. No respiratory distress. She has no wheezes. She has no rales.   Abdominal: Abdomen is soft. She exhibits no distension. There is no abdominal tenderness.   Musculoskeletal:      Comments: No tenderness to palpation to back     Neurological: She is alert.   Psychiatric: She has a normal mood and affect.         ED Course   Procedures  Labs Reviewed - No data to display       Imaging Results              CT Lumbar Spine Without Contrast (Preliminary result)  Result time 10/22/23 22:25:43      Preliminary result by Gaetano Meadows MD (10/22/23 22:25:43)                   Narrative:    START OF REPORT:  Technique: CT of the lumbar spine was performed without intravenous contrast with direct axial as well as sagittal and coronal reconstruction images.    Comparison: Comparison is with prior CT study dated2023-03-06 14:10:27.    Clinical history: Back Pain (Pt reporting back pain that radiates to bilateral legs x6 months, reporting  tingling to both legs, denied trouble urinating or BM or trauma to area.    Findings:  Anatomy: Unremarkable.  Mineralization: Mild osteopenia is seen of the visualized bony structures.  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  Bone alignment: Grade I anterior listhesis of L4 on L5 . This is not significantly changed since the prior examination.  Curvature: The lumbar lordosis is maintained.  Bone and bone marrow: The vertebral body heights are maintained.  Intervertebral disc spaces: There is interval development of vacuum phenomenon at L2-L3, L3-L4 and L4-L5.  Osteophytes: Again noted are prominent anterior marginal osteophytes in the lower thoracic and upper lumbar spine.  Endplate Sclerosis: Mild endplate sclerosis is seen off the opposing endplates at L2-L3.  Facet degenerative changes: Severe facet degenerative changes are seen at L4-L5 and L5-S1.  Spinal canal: Mild stenosis of the spinal canal is seen at the L3-L4 down through L4-L5 intervertebral disc level. The stenosis appears to be related to disc pathology and bony degenerative changes.  Fractures: No acute lumbar spine fracture dislocation or subluxation is seen.      Impression:  1. No acute lumbar spine fracture dislocation or subluxation is seen.  2. Degenerative changes and other findings as above.                                         Medications   ketorolac injection 60 mg (60 mg Intramuscular Given 10/23/23 0154)   methocarbamoL tablet 500 mg (500 mg Oral Given 10/23/23 0153)   orphenadrine injection 60 mg (60 mg Intramuscular Given 10/23/23 0205)     Medical Decision Making  Acute on chronic back pain - ran out of [facility]  No red flags  Resolved with single dose pain medication   Will discharge with pain regimen, outpatient follow up    Differential diagnosis includes but is not limited to:  Sprain, strain, contusion, DDD, spinal stenosis      Problems Addressed:  Chronic bilateral low back pain with bilateral sciatica:  undiagnosed new problem with uncertain prognosis    Amount and/or Complexity of Data Reviewed  External Data Reviewed: labs, radiology and notes.  Radiology: ordered.    Risk  Prescription drug management.            Scribe Attestation:   Scribe #1: I performed the above scribed service and the documentation accurately describes the services I performed. I attest to the accuracy of the note.    Attending Attestation:           Physician Attestation for Scribe:  Physician Attestation Statement for Scribe #1: I, reviewed documentation, as scribed by Beto Rinaldi in my presence, and it is both accurate and complete.             ED Course as of 10/23/23 0302   Mon Oct 23, 2023   0233 Re-eval of pt at 0230: pain is completely resolved, eating potato chips in no pain, and is amenable of plan to discharge [BG]      ED Course User Index  [BG] Beto Rinaldi                    Clinical Impression:   Final diagnoses:  [M54.42, M54.41, G89.29] Chronic bilateral low back pain with bilateral sciatica (Primary)        ED Disposition Condition    Discharge Stable          ED Prescriptions       Medication Sig Dispense Start Date End Date Auth. Provider    HYDROcodone-acetaminophen (NORCO) 5-325 mg per tablet Take 1 tablet by mouth every 6 (six) hours as needed for Pain. 12 tablet 10/23/2023 -- Devante Beltre IV, MD    LIDOcaine (LIDODERM) 5 % Place 1 patch onto the skin once daily. Remove & Discard patch within 12 hours or as directed by MD for 7 days 7 patch 10/23/2023 10/30/2023 Devante Beltre IV, MD    ibuprofen (ADVIL,MOTRIN) 600 MG tablet Take 1 tablet (600 mg total) by mouth every 6 (six) hours as needed for Pain. 20 tablet 10/23/2023 -- Devante Beltre IV, MD    methocarbamoL (ROBAXIN) 500 MG Tab Take 2 tablets (1,000 mg total) by mouth 3 (three) times daily as needed (Pain). 30 tablet 10/23/2023 10/28/2023 Devante Beltre IV, MD          Follow-up Information       Follow up With Specialties Details Why Contact  Info    [facility] - Emergency Dept Emergency Medicine Go to  If symptoms worsen [address] [facility] [id number] [phone]    Tiffany Harris, [facility]   [address] [address]  [phone]      Primary care physician  Schedule an appointment as soon as possible for a visit   Follow up with you primary care physician.   If you do not have a primary care physician call [phone] to schedule an appointment.             Devante Beltre IV, MD  10/23/23 9407

## 2023-11-05 ENCOUNTER — HOSPITAL ENCOUNTER (EMERGENCY)
Facility: HOSPITAL | Age: 68
Discharge: HOME | End: 2023-11-05
Attending: STUDENT IN AN ORGANIZED HEALTH CARE EDUCATION/TRAINING PROGRAM
Payer: MEDICARE

## 2023-11-05 VITALS
HEART RATE: 69 BPM | DIASTOLIC BLOOD PRESSURE: 61 MMHG | HEIGHT: 66 IN | WEIGHT: 265 LBS | BODY MASS INDEX: 42.59 KG/M2 | TEMPERATURE: 98 F | OXYGEN SATURATION: 98 % | SYSTOLIC BLOOD PRESSURE: 143 MMHG | RESPIRATION RATE: 19 BRPM

## 2023-11-05 DIAGNOSIS — M54.12 CERVICAL RADICULOPATHY: Primary | ICD-10-CM

## 2023-11-05 DIAGNOSIS — M25.561 CHRONIC PAIN OF RIGHT KNEE: ICD-10-CM

## 2023-11-05 DIAGNOSIS — G89.29 CHRONIC PAIN OF RIGHT KNEE: ICD-10-CM

## 2023-11-05 PROCEDURE — 99284 EMERGENCY DEPT VISIT MOD MDM: CPT

## 2023-11-05 PROCEDURE — 25000003 PHARM REV CODE 250: Performed by: NURSE PRACTITIONER

## 2023-11-05 RX ORDER — CYCLOBENZAPRINE HCL 10 MG
10 TABLET ORAL 3 TIMES DAILY PRN
Qty: 15 TABLET | Refills: 0 | Status: SHIPPED | OUTPATIENT
Start: 2023-11-05 | End: 2023-11-10

## 2023-11-05 RX ORDER — HYDROCODONE BITARTRATE AND ACETAMINOPHEN 10; 325 MG/1; MG/1
1 TABLET ORAL
Status: COMPLETED | OUTPATIENT
Start: 2023-11-05 | End: 2023-11-05

## 2023-11-05 RX ORDER — HYDROCODONE BITARTRATE AND ACETAMINOPHEN 7.5; 325 MG/1; MG/1
1 TABLET ORAL EVERY 6 HOURS PRN
Qty: 16 TABLET | Refills: 0 | OUTPATIENT
Start: 2023-11-05 | End: 2023-11-25

## 2023-11-05 RX ADMIN — HYDROCODONE BITARTRATE AND ACETAMINOPHEN 1 TABLET: 10; 325 TABLET ORAL at 08:11

## 2023-11-06 NOTE — FIRST PROVIDER EVALUATION
"Medical screening examination initiated.  I have conducted a focused provider triage encounter, findings are as follows:    Brief history of present illness:  68 year old female presents to ER with c/o generalized pain. She reports pain to bilateral shoulders, knees, back, and legs x 5 months. States pain worse today. Taking robaxin and norco with no relief of symptoms.    Vitals:    11/05/23 1945   BP: (!) 155/71   Pulse: 71   Resp: 18   Temp: 97.9 °F (36.6 °C)   TempSrc: Tympanic   SpO2: 99%   Weight: 120.2 kg (265 lb)   Height: 5' 6" (1.676 m)       Pertinent physical exam:  awake and alert, nad    Brief workup plan:  Physical exam     Preliminary workup initiated; this workup will be continued and followed by the physician or advanced practice provider that is assigned to the patient when roomed.  "

## 2023-11-06 NOTE — ED PROVIDER NOTES
Encounter Date: 11/5/2023       History     Chief Complaint   Patient presents with    Joint Pain     C/o shoulder, knee and back pain x5 months that progressively gotten worse. Pt taking norco for pain, denies relief. Has appointment with ortho on 11/9     See MDM    The history is provided by the patient. No  was used.     Review of patient's allergies indicates:   Allergen Reactions    Penicillins Hives    Aspirin Rash    Keflex [cephalexin] Rash    Tramadol Rash     Past Medical History:   Diagnosis Date    Hyperlipidemia     Hypertension     Personal history of colonic polyps 04/14/2022     Past Surgical History:   Procedure Laterality Date    COLONOSCOPY  04/14/2022     Family History   Problem Relation Age of Onset    Heart attack Mother     Thyroid disease Mother     Diabetes Father     Thyroid disease Father     Heart attack Father      Social History     Tobacco Use    Smoking status: Never     Passive exposure: Current    Smokeless tobacco: Never   Substance Use Topics    Alcohol use: Not Currently    Drug use: Never     Review of Systems   Constitutional:  Negative for fever.   Respiratory:  Negative for cough and shortness of breath.    Cardiovascular:  Negative for chest pain.   Gastrointestinal:  Negative for abdominal pain.   Genitourinary:  Negative for difficulty urinating and dysuria.   Musculoskeletal:  Positive for neck pain. Negative for gait problem.   Skin:  Negative for color change.   Neurological:  Negative for dizziness, speech difficulty and headaches.   Psychiatric/Behavioral:  Negative for hallucinations and suicidal ideas.    All other systems reviewed and are negative.      Physical Exam     Initial Vitals [11/05/23 1945]   BP Pulse Resp Temp SpO2   (!) 155/71 71 18 97.9 °F (36.6 °C) 99 %      MAP       --         Physical Exam    Nursing note and vitals reviewed.  Constitutional: She appears well-developed and well-nourished.   HENT:   Head: Normocephalic.    Eyes: EOM are normal.   Neck:   Normal range of motion.  Cardiovascular:  Normal rate, regular rhythm, normal heart sounds and intact distal pulses.           Pulmonary/Chest: Breath sounds normal. No respiratory distress.   Abdominal: Abdomen is soft. Bowel sounds are normal. There is no abdominal tenderness.   Musculoskeletal:         General: Normal range of motion.      Cervical back: Normal range of motion.      Comments: Tenderness to the right trapezius.  No swelling noted to right knee.  No signs of septic joint.     Neurological: She is alert and oriented to person, place, and time. She has normal strength.   Skin: Skin is warm and dry.   Psychiatric: She has a normal mood and affect. Her behavior is normal. Judgment and thought content normal.         ED Course   Procedures  Labs Reviewed - No data to display         Imaging Results    None          Medications   HYDROcodone-acetaminophen  mg per tablet 1 tablet (1 tablet Oral Given 11/5/23 2034)     Medical Decision Making  Historian:  Patient.  Patient is a 68-year-old female  that presents with neck pain radiating down right arm and right lower back pain that radiates to her right knee that has been present 5 month. Associated symptoms nothing. Surrounding information is nothing. Exacerbated by movement. Relieved by nothing. Patient treatment prior to arrival pain medication and evaluation by her primary care and awaiting appointment to Orthopedics 11/09/2023. Risk factors include nothing. Other history pertaining to this complaint nothing.   Assessment:  See physical exam.  DD:  Cervical radiculopathy, trapezius strain, sciatica, knee pain      Amount and/or Complexity of Data Reviewed  Discussion of management or test interpretation with external provider(s): History was obtained.  For the performed.  Patient has been seen for both of these problems on an outpatient basis she has orthopedic follow-up for her knee.  I instructed her to follow  up with her PCP for MRI of her neck.  This appears to be cervical radiculopathy.  No medical or surgical consult indicated in ER.  No social determinants that affect healthcare were noted.    Risk  Prescription drug management.                               Clinical Impression:   Final diagnoses:  [M54.12] Cervical radiculopathy (Primary)  [M25.561, G89.29] Chronic pain of right knee        ED Disposition Condition    Discharge Stable          ED Prescriptions       Medication Sig Dispense Start Date End Date Auth. Provider    HYDROcodone-acetaminophen (NORCO) 7.5-325 mg per tablet Take 1 tablet by mouth every 6 (six) hours as needed for Pain. 16 tablet 11/5/2023 -- Vivek Manuel FNP    cyclobenzaprine (FLEXERIL) 10 MG tablet Take 1 tablet (10 mg total) by mouth 3 (three) times daily as needed. 15 tablet 11/5/2023 11/10/2023 Vivek Manuel FNP          Follow-up Information       Follow up With Specialties Details Why Contact Info    Your Primary Care Provider  Call in 3 days ed follow up              Vivek Manuel FNP  11/05/23 2037

## 2023-11-09 ENCOUNTER — HOSPITAL ENCOUNTER (OUTPATIENT)
Dept: RADIOLOGY | Facility: CLINIC | Age: 68
Discharge: HOME | End: 2023-11-09
Payer: MEDICARE

## 2023-11-09 ENCOUNTER — OFFICE VISIT (OUTPATIENT)
Dept: ORTHOPEDICS | Facility: CLINIC | Age: 68
End: 2023-11-09
Payer: MEDICARE

## 2023-11-09 VITALS
HEIGHT: 66 IN | HEART RATE: 73 BPM | DIASTOLIC BLOOD PRESSURE: 65 MMHG | BODY MASS INDEX: 42.59 KG/M2 | WEIGHT: 265 LBS | SYSTOLIC BLOOD PRESSURE: 144 MMHG

## 2023-11-09 DIAGNOSIS — G89.29 CHRONIC RIGHT SHOULDER PAIN: ICD-10-CM

## 2023-11-09 DIAGNOSIS — M25.511 RIGHT SHOULDER PAIN, UNSPECIFIED CHRONICITY: ICD-10-CM

## 2023-11-09 DIAGNOSIS — M25.511 RIGHT SHOULDER PAIN, UNSPECIFIED CHRONICITY: Primary | ICD-10-CM

## 2023-11-09 DIAGNOSIS — M54.16 LUMBAR RADICULOPATHY: ICD-10-CM

## 2023-11-09 DIAGNOSIS — M17.0 OSTEOARTHRITIS OF BOTH KNEES, UNSPECIFIED OSTEOARTHRITIS TYPE: ICD-10-CM

## 2023-11-09 DIAGNOSIS — M75.81 RIGHT ROTATOR CUFF TENDINITIS: ICD-10-CM

## 2023-11-09 DIAGNOSIS — M54.12 CERVICAL RADICULOPATHY: ICD-10-CM

## 2023-11-09 DIAGNOSIS — M25.511 CHRONIC RIGHT SHOULDER PAIN: ICD-10-CM

## 2023-11-09 DIAGNOSIS — M25.561 RIGHT KNEE PAIN, UNSPECIFIED CHRONICITY: ICD-10-CM

## 2023-11-09 PROCEDURE — 73030 XR SHOULDER COMPLETE 2 OR MORE VIEWS RIGHT: ICD-10-PCS | Mod: RT,,,

## 2023-11-09 PROCEDURE — 1159F PR MEDICATION LIST DOCUMENTED IN MEDICAL RECORD: ICD-10-PCS | Mod: CPTII,,,

## 2023-11-09 PROCEDURE — 99204 PR OFFICE/OUTPT VISIT, NEW, LEVL IV, 45-59 MIN: ICD-10-PCS | Mod: ,,,

## 2023-11-09 PROCEDURE — 3078F PR MOST RECENT DIASTOLIC BLOOD PRESSURE < 80 MM HG: ICD-10-PCS | Mod: CPTII,,,

## 2023-11-09 PROCEDURE — 3066F NEPHROPATHY DOC TX: CPT | Mod: CPTII,,,

## 2023-11-09 PROCEDURE — 3077F SYST BP >= 140 MM HG: CPT | Mod: CPTII,,,

## 2023-11-09 PROCEDURE — 3066F PR DOCUMENTATION OF TREATMENT FOR NEPHROPATHY: ICD-10-PCS | Mod: CPTII,,,

## 2023-11-09 PROCEDURE — 3288F FALL RISK ASSESSMENT DOCD: CPT | Mod: CPTII,,,

## 2023-11-09 PROCEDURE — 3008F BODY MASS INDEX DOCD: CPT | Mod: CPTII,,,

## 2023-11-09 PROCEDURE — 1160F PR REVIEW ALL MEDS BY PRESCRIBER/CLIN PHARMACIST DOCUMENTED: ICD-10-PCS | Mod: CPTII,,,

## 2023-11-09 PROCEDURE — 99204 OFFICE O/P NEW MOD 45 MIN: CPT | Mod: ,,,

## 2023-11-09 PROCEDURE — 1100F PTFALLS ASSESS-DOCD GE2>/YR: CPT | Mod: CPTII,,,

## 2023-11-09 PROCEDURE — 3060F PR POS MICROALBUMINURIA RESULT DOCUMENTED/REVIEW: ICD-10-PCS | Mod: CPTII,,,

## 2023-11-09 PROCEDURE — 73560 XR KNEE 1 OR 2 VIEW RIGHT: ICD-10-PCS | Mod: RT,,,

## 2023-11-09 PROCEDURE — 3288F PR FALLS RISK ASSESSMENT DOCUMENTED: ICD-10-PCS | Mod: CPTII,,,

## 2023-11-09 PROCEDURE — 3078F DIAST BP <80 MM HG: CPT | Mod: CPTII,,,

## 2023-11-09 PROCEDURE — 3077F PR MOST RECENT SYSTOLIC BLOOD PRESSURE >= 140 MM HG: ICD-10-PCS | Mod: CPTII,,,

## 2023-11-09 PROCEDURE — 3060F POS MICROALBUMINURIA REV: CPT | Mod: CPTII,,,

## 2023-11-09 PROCEDURE — 1160F RVW MEDS BY RX/DR IN RCRD: CPT | Mod: CPTII,,,

## 2023-11-09 PROCEDURE — 1100F PR PT FALLS ASSESS DOC 2+ FALLS/FALL W/INJURY/YR: ICD-10-PCS | Mod: CPTII,,,

## 2023-11-09 PROCEDURE — 3008F PR BODY MASS INDEX (BMI) DOCUMENTED: ICD-10-PCS | Mod: CPTII,,,

## 2023-11-09 PROCEDURE — 73030 X-RAY EXAM OF SHOULDER: CPT | Mod: RT,,,

## 2023-11-09 PROCEDURE — 73560 X-RAY EXAM OF KNEE 1 OR 2: CPT | Mod: RT,,,

## 2023-11-09 PROCEDURE — 1159F MED LIST DOCD IN RCRD: CPT | Mod: CPTII,,,

## 2023-11-09 RX ORDER — METHYLPREDNISOLONE 4 MG/1
TABLET ORAL
Qty: 21 EACH | Refills: 0 | OUTPATIENT
Start: 2023-11-09 | End: 2023-11-25

## 2023-11-09 NOTE — PROGRESS NOTES
Subjective:    CC: Pain of the Left Knee, Pain of the Right Knee, and Pain of the Right Shoulder (Bilateral knee pain - pt states that her left knee hurts more than the right - she states that her knee pain as been a while. She states that she has a lot pain in her thigh. R shoulder pain - pt has been having pain from her shoulder to her fingers. Pt is ambulating with a rolator. Family states that she has had a few falls, but the pains were prior to the falls. )       HPI:  New Patient presents to clinic for evaluation.  She states he is had chronic bilateral knee pain as well as right shoulder pain for well over 5 months.  She is had multiple trips to the ER for this.  She has had previous CTs of the C-spine and lumbar revealing degeneration.  She states that her right hand will go numb and feels that it is spasms on her.  She states she does have neck pain.  She denies any previous neck surgeries.  She is difficulty with overhead reaching.  States the left hand is also numb but is much more manageable.  She states her right knee is her worse knee.  She also complains of bilateral lower extremity numbness and tingling down to the feet.  Denies any back surgeries but admits to back pain.  She does not have a neck or back doctor currently.  She states she did have a fall many months ago but does not believe this is attributed to her pain since it was existing prior to this.  She did have x-rays at the time of these falls that showed no fractures.  Currently taking Norco 7.5 from her most recent ER visit.  Patient is on a blood thinner.  No recent steroid use.  She also states that she is previously had formal physical therapy which has helped her significantly.    ROS: Refer to HPI for pertinent ROS. All other 12 point systems negative.    Objective:    Vitals:    11/09/23 1521   BP: (!) 144/65   Pulse: 73        Physical Exam:  Patient is well-nourished and well-developed, in no apparent distress, pleasant and  cooperative. Examination of the right upper extremity compartments are soft and warm.  Skin is intact. There are no signs or symptoms of DVT or infection.   Patient is tender to palpation along the  anterolateral shoulder as well as posterior shoulder .  Patient is able to forward flex and abduct to 100° actively; 130° passively.  Positive Jeong and Neers, positive empty can, unable to perform drop arm test due to patient hesitancy. Negative sulcus sign. Stable to stressing.  Patient has numbness and tingling about all 5 digits of the hand.  Positive Tinel's and Phalen's at the carpal tunnel.  Negative Tinel's at the cubital tunnel.  Able to flex and extend fingers appropriately the with some stiffness.  She has appropriate range of motion at the elbow and wrist.  No obvious thenar or hypothenar wasting.  Neurovascularly intact distally.    Examination of the right lower extremity compartments are soft and warm. Skin is intact. There are no signs or symptoms of DVT or infection. There is no obvious joint effusion. There is no erythema. Tender to palpation along the anterior joint line and posterior knee ,right knee range of motion is 5-95. The knee is stable to exam with varus and valgus stressing. Negative anterior and posterior drawer. Negative Lachman´s.  Negative Alex's test. Patella grind is positive, Negative for apprehension. Neurovascularly intact distally.    Images:  X-rays:  Three views of the right shoulder demonstrate no obvious fracture or dislocation.  Four views of the right knee demonstrate tricompartmental osteoarthritis.  Kg grade 4.  No obvious fracture dislocation.  Images Reviewed and discussed with patient.    Assessment:  1. Osteoarthritis of both knees, unspecified osteoarthritis type  - Ambulatory referral/consult to Orthopedics  - Ambulatory referral/consult to Physical/Occupational Therapy; Future  - methylPREDNISolone (MEDROL DOSEPACK) 4 mg tablet; use as directed  Dispense: 21  each; Refill: 0    2. Chronic right shoulder pain  - Ambulatory referral/consult to Orthopedics    3. Right shoulder pain, unspecified chronicity  - X-ray Shoulder 2 or More Views Right; Future    4. Right knee pain, unspecified chronicity    5. Right rotator cuff tendinitis  - Ambulatory referral/consult to Physical/Occupational Therapy; Future  - methylPREDNISolone (MEDROL DOSEPACK) 4 mg tablet; use as directed  Dispense: 21 each; Refill: 0    6. Cervical radiculopathy    7. Lumbar radiculopathy       Plan:  Physical exam and imaging findings as well as ER records discussed with patient.  Patient has multiple conditions to address in clinic today.  We will start with a Medrol Dosepak and formal physical therapy.  We will also set up a appointment with our partner Dr. Adams  for evaluation of cervical and lumbar radiculopathy as I believe this is also contributing to her pain.  We will hold off on NSAIDs as she is on a blood thinner.  I would like to see the patient back in 6 weeks to assess her progress.    Follow up: Follow up in about 6 weeks (around 12/21/2023).

## 2023-11-22 NOTE — PROGRESS NOTES
CHIEF COMPLAINT:   Chief Complaint   Patient presents with    Follow-up     1 yr f/u sob w/exertion                                                  HPI:  Leatha Martinez 68 y.o. female Medical history of hypertension, hyperlipidemia, obesity, history of venous thrombosis and embolism presents to Cardiology Clinic today for 1 year follow up and ongoing care.  At last office visit patient complained of being chronically SOB after COVID.  Otherwise she denied any other cardiac complaints at that time.  Her blood pressure was found to be slightly elevated at that time, but she attributed this to walking up stairs and being short of breath.    Today the patient reports ongoing shortness of breath with exertion.   She states that she has shortness of breath with ambulation and while doing household chores.  Stable from last office visit.  She denies any further cardiac complaints such as chest pain, PND, orthopnea, lightheadedness, dizziness, syncope, or claudication symptoms.  She states that she has had several mechanical falls over the last year due to low back pain that is causing weakness and sharp pains radiating into bilateral lower legs and feet.  She states that she is able to complete her ADLs without any ischemic symptoms.  She reports compliance with all her medications.  She denies any tobacco or illicit drug use.  She is still on Xarelto and tolerating well.                                                                                                                                                                                                                                                                                                                                                                                                                                    CARDIAC TESTING:  No results found for this or any previous visit.    No results found for this or any previous visit.     No results found  for this or any previous visit.       Patient Active Problem List   Diagnosis    Hypertension    Gastro-esophageal reflux disease without esophagitis    Other hyperlipidemia    Class 3 severe obesity due to excess calories with serious comorbidity and body mass index (BMI) of 45.0 to 49.9 in adult    Personal history of other venous thrombosis and embolism    Personal history of COVID-19    Abnormal TSH    Leukocytosis    Post-COVID chronic dyspnea     Past Surgical History:   Procedure Laterality Date    COLONOSCOPY  04/14/2022     Social History     Socioeconomic History    Marital status: Single    Number of children: 3   Tobacco Use    Smoking status: Never     Passive exposure: Current    Smokeless tobacco: Never   Substance and Sexual Activity    Alcohol use: Never    Drug use: Never    Sexual activity: Not Currently     Partners: Male     Social Determinants of Health     Financial Resource Strain: Medium Risk (4/4/2023)    Overall Financial Resource Strain (CARDIA)     Difficulty of Paying Living Expenses: Somewhat hard   Food Insecurity: No Food Insecurity (4/4/2023)    Hunger Vital Sign     Worried About Running Out of Food in the Last Year: Never true     Ran Out of Food in the Last Year: Never true   Transportation Needs: Unmet Transportation Needs (4/4/2023)    PRAPARE - Transportation     Lack of Transportation (Medical): Yes     Lack of Transportation (Non-Medical): Yes   Physical Activity: Unknown (4/4/2023)    Exercise Vital Sign     Days of Exercise per Week: 3 days   Stress: Stress Concern Present (4/4/2023)    [employer] - Occupational Stress Questionnaire     Feeling of Stress : To some extent   Social Connections: Unknown (4/4/2023)    Social Connection and Isolation Panel [NHANES]     Frequency of Communication with Friends and Family: Three times a week     Frequency of Social Gatherings with Friends and Family: Three times a week     Attends [employer] Services:  "1 to 4 times per year     Active Member of Clubs or Organizations: No     Attends Club or Organization Meetings: Never   Housing Stability: Low Risk  (4/4/2023)    Housing Stability Vital Sign     Unable to Pay for Housing in the Last Year: No     Number of Places Lived in the Last Year: 1     Unstable Housing in the Last Year: No        Family History   Problem Relation Age of Onset    Heart attack Mother     Thyroid disease Mother     Diabetes Father     Thyroid disease Father     Heart attack Father      Review of patient's allergies indicates:   Allergen Reactions    Penicillins Hives    Aspirin Rash    Keflex [cephalexin] Rash    Tramadol Rash         ROS:  Review of Systems   Constitutional: Negative.    HENT: Negative.     Eyes: Negative.    Respiratory: Negative.  Negative for shortness of breath.    Cardiovascular: Negative.    Gastrointestinal: Negative.    Genitourinary: Negative.    Musculoskeletal: Negative.    Skin: Negative.    Neurological: Negative.    Endo/Heme/Allergies: Negative.    Psychiatric/Behavioral: Negative.                                                                                                                                                                                  Negative except as stated in the history of present illness. See HPI for details.    PHYSICAL EXAM:  Visit Vitals  /71 (BP Location: Right arm, Patient Position: Sitting, BP Method: Medium (Automatic))   Pulse 70   Temp 98.4 °F (36.9 °C)   Resp 18   Ht 5' 5" (1.651 m)   Wt 118 kg (260 lb 2.3 oz)   SpO2 99%   BMI 43.29 kg/m²       Physical Exam  HENT:      Head: Normocephalic.      Nose: Nose normal.   Eyes:      Pupils: Pupils are equal, round, and reactive to light.   Cardiovascular:      Rate and Rhythm: Normal rate and regular rhythm.   Pulmonary:      Effort: Pulmonary effort is normal.   Abdominal:      General: Abdomen is flat. Bowel sounds are normal.      Palpations: Abdomen is soft. "   Musculoskeletal:         General: Normal range of motion.      Cervical back: Normal range of motion.   Skin:     General: Skin is warm.   Neurological:      General: No focal deficit present.      Mental Status: She is alert.   Psychiatric:         Mood and Affect: Mood normal.         Current Outpatient Medications   Medication Instructions    albuterol (VENTOLIN HFA) 90 mcg/actuation inhaler 2 puffs, Inhalation, Every 6 hours PRN, Rescue    amLODIPine (NORVASC) 10 mg, Oral, Daily    ergocalciferol (ERGOCALCIFEROL) 50,000 Units, Oral, Every 7 days    furosemide (LASIX) 20 mg, Oral, Daily    gabapentin (NEURONTIN) 300 mg, Oral, 3 times daily    HYDROcodone-acetaminophen (NORCO) 7.5-325 mg per tablet 1 tablet, Oral, Every 8 hours PRN    ibuprofen (ADVIL,MOTRIN) 600 mg, Oral, Every 6 hours PRN    methocarbamoL (ROBAXIN) 750 mg, Oral, 2 times daily PRN    methylPREDNISolone (MEDROL DOSEPACK) 4 mg tablet use as directed    nitrofurantoin (MACRODANTIN) 50 mg, Oral, Daily    pantoprazole (PROTONIX) 40 mg, Oral, Daily    rivaroxaban (XARELTO) 20 mg, Oral, With dinner    simvastatin (ZOCOR) 20 mg, Oral, Nightly    triamcinolone acetonide 0.1% (KENALOG) 0.1 % cream Topical (Top), 2 times daily        All medications, laboratory studies, cardiac diagnostic imaging reviewed.     Lab Results   Component Value Date    LDL 86.00 03/31/2023    LDL 88.00 10/04/2022    TRIG 93 03/31/2023    TRIG 91 10/04/2022    CREATININE 0.73 09/12/2023    MG 1.90 07/29/2023    K 3.8 09/12/2023        ASSESSMENT/PLAN:  Leatha Martinez is a 68 y.o. female with a PMH unprovoked subsegmental PE (2013), severe COVID, HTN, HLD, venous insufficiency, obesity here as a followup. PE reportedly occurred after a flight from [place]. She also states she has a family history of blood clots and lupus. She apparently had testing done for hypercoagulability previously.      - Endorses ongoing SOB with exertion. Denies any further cardiac complaints  - Will have  patient complete Echo to rule out any underlying heart failure, structural, or valvular disease that may be contributing to her symptoms  - Continue Xarelto 20mg daily for prior PE.  Denies any adverse bleeding. Would evaluate causes of microcytic anemia.   - Continue Amlodipine 10mg daily and Lasix 20mg daily  - LDL 86 per labs March 2023.   - Continue Simvastatin 20mg daily  - Counseled on the importance of following a low-sodium, low-cholesterol, low-fat diet and exercise as tolerated  - Encouraged weight loss  - Refills completed per PCP  - EKG today with NSR - unchanged from prior       EKG   Complete echocardiogram   Follow up in cardiology clinic in 1 year or sooner if needed  Follow up with PCP as directed

## 2023-11-25 ENCOUNTER — HOSPITAL ENCOUNTER (EMERGENCY)
Facility: HOSPITAL | Age: 68
Discharge: HOME | End: 2023-11-25
Attending: EMERGENCY MEDICINE
Payer: MEDICARE

## 2023-11-25 VITALS
OXYGEN SATURATION: 100 % | WEIGHT: 265 LBS | SYSTOLIC BLOOD PRESSURE: 126 MMHG | TEMPERATURE: 98 F | DIASTOLIC BLOOD PRESSURE: 68 MMHG | HEART RATE: 70 BPM | HEIGHT: 66 IN | RESPIRATION RATE: 18 BRPM | BODY MASS INDEX: 42.59 KG/M2

## 2023-11-25 DIAGNOSIS — G89.29 CHRONIC BILATERAL LOW BACK PAIN WITH BILATERAL SCIATICA: Primary | ICD-10-CM

## 2023-11-25 DIAGNOSIS — M54.42 CHRONIC BILATERAL LOW BACK PAIN WITH BILATERAL SCIATICA: Primary | ICD-10-CM

## 2023-11-25 DIAGNOSIS — M54.41 CHRONIC BILATERAL LOW BACK PAIN WITH BILATERAL SCIATICA: Primary | ICD-10-CM

## 2023-11-25 PROCEDURE — 25000003 PHARM REV CODE 250: Performed by: NURSE PRACTITIONER

## 2023-11-25 PROCEDURE — 99284 EMERGENCY DEPT VISIT MOD MDM: CPT

## 2023-11-25 RX ORDER — GABAPENTIN 300 MG/1
300 CAPSULE ORAL 3 TIMES DAILY
Qty: 90 CAPSULE | Refills: 0 | Status: SHIPPED | OUTPATIENT
Start: 2023-11-25 | End: 2024-01-30 | Stop reason: SDUPTHER

## 2023-11-25 RX ORDER — METHOCARBAMOL 750 MG/1
750 TABLET, FILM COATED ORAL 2 TIMES DAILY PRN
Qty: 20 TABLET | Refills: 0 | Status: SHIPPED | OUTPATIENT
Start: 2023-11-25 | End: 2023-12-05

## 2023-11-25 RX ORDER — HYDROCODONE BITARTRATE AND ACETAMINOPHEN 7.5; 325 MG/1; MG/1
1 TABLET ORAL EVERY 8 HOURS PRN
Qty: 12 TABLET | Refills: 0 | OUTPATIENT
Start: 2023-11-25 | End: 2024-02-03

## 2023-11-25 RX ORDER — HYDROCODONE BITARTRATE AND ACETAMINOPHEN 7.5; 325 MG/1; MG/1
1 TABLET ORAL
Status: COMPLETED | OUTPATIENT
Start: 2023-11-25 | End: 2023-11-25

## 2023-11-25 RX ORDER — METHYLPREDNISOLONE 4 MG/1
TABLET ORAL
Qty: 21 EACH | Refills: 0 | Status: SHIPPED | OUTPATIENT
Start: 2023-11-25 | End: 2023-12-16

## 2023-11-25 RX ORDER — CYCLOBENZAPRINE HCL 10 MG
10 TABLET ORAL
Status: COMPLETED | OUTPATIENT
Start: 2023-11-25 | End: 2023-11-25

## 2023-11-25 RX ADMIN — HYDROCODONE BITARTRATE AND ACETAMINOPHEN 1 TABLET: 7.5; 325 TABLET ORAL at 08:11

## 2023-11-25 RX ADMIN — CYCLOBENZAPRINE 10 MG: 10 TABLET, FILM COATED ORAL at 08:11

## 2023-11-26 NOTE — FIRST PROVIDER EVALUATION
"Medical screening examination initiated.  I have conducted a focused provider triage encounter, findings are as follows:    Brief history of present illness:  Patient states lower back pain that goes to her bilateral lower extremities x "months."    There were no vitals filed for this visit.    Pertinent physical exam:  Awake, alert, ambulatory      Brief workup plan:  Imaging    Preliminary workup initiated; this workup will be continued and followed by the physician or advanced practice provider that is assigned to the patient when roomed.  "

## 2023-11-26 NOTE — DISCHARGE INSTRUCTIONS
Take Norco as needed for pain. OK to rotate with over-the-counter anti-inflammatories as tolerated. Take gabapentin three times daily for relief of numbness/ tingling.    Also recommend taking Medrol dose pack as instructed by pharmacy. You may take Robaxin as needed for muscle pain.    You need to see primary care provider for continued management of these medications.    Return to ER with worsening symptoms.

## 2023-11-26 NOTE — ED PROVIDER NOTES
Encounter Date: 11/25/2023       History     Chief Complaint   Patient presents with    Back Pain     Reports bilat lower back pain shooting down both legs to feet for 7 months intermitently that has become worse today, no meds pta, denies loss of bowel or bladder function     See MDM for details     The history is provided by the patient.     Review of patient's allergies indicates:   Allergen Reactions    Penicillins Hives    Aspirin Rash    Keflex [cephalexin] Rash    Tramadol Rash     Past Medical History:   Diagnosis Date    Hyperlipidemia     Hypertension     Personal history of colonic polyps 04/14/2022     Past Surgical History:   Procedure Laterality Date    COLONOSCOPY  04/14/2022     Family History   Problem Relation Age of Onset    Heart attack Mother     Thyroid disease Mother     Diabetes Father     Thyroid disease Father     Heart attack Father      Social History     Tobacco Use    Smoking status: Never     Passive exposure: Current    Smokeless tobacco: Never   Substance Use Topics    Alcohol use: Never    Drug use: Never     Review of Systems   Constitutional:  Negative for chills and fever.   Respiratory:  Negative for shortness of breath.    Cardiovascular:  Negative for chest pain.   Musculoskeletal:  Positive for back pain and myalgias. Negative for gait problem.   Skin:  Negative for wound.   Neurological:  Positive for numbness. Negative for weakness.   All other systems reviewed and are negative.      Physical Exam     Initial Vitals [11/25/23 2013]   BP Pulse Resp Temp SpO2   126/68 70 20 97.6 °F (36.4 °C) 100 %      MAP       --         Physical Exam    Nursing note and vitals reviewed.  Constitutional: She is not diaphoretic. No distress.   Obese.  Sitting up in chair.  Resting comfortably.   HENT:   Head: Normocephalic and atraumatic.   Eyes: Conjunctivae and EOM are normal.   Neck:   Normal range of motion.  Cardiovascular:  Normal rate, regular rhythm and normal heart sounds.            Pulmonary/Chest: Breath sounds normal. No respiratory distress.   Musculoskeletal:         General: Normal range of motion.      Cervical back: Normal range of motion.      Comments: Thoracic spine:  No bony tenderness.  No paraspinal muscle tenderness.  Lumbar spine:  No bony tenderness. No step-offs. Positive bilateral paraspinal muscle tenderness with active muscle spasms.  Positive straight leg raise bilaterally.  4-5 motor strength in bilateral lower extremities.     Neurological: She is alert and oriented to person, place, and time. GCS score is 15. GCS eye subscore is 4. GCS verbal subscore is 5. GCS motor subscore is 6.   Motor and sensation intact in bilateral lower extremities, patient reports worsening paresthesias in right lower extremity and foot.   Skin: Skin is warm and dry.   Psychiatric: Thought content normal.         ED Course   Procedures  Labs Reviewed - No data to display       Imaging Results    None          Medications   HYDROcodone-acetaminophen 7.5-325 mg per tablet 1 tablet (1 tablet Oral Given 11/25/23 2020)   cyclobenzaprine tablet 10 mg (10 mg Oral Given 11/25/23 2020)     Medical Decision Making  68-year-old female with past medical history of chronic pain presents to the ER for bilateral lower back pain x7 months.  Worsening over the last week.  Patient reports that her pain is localized to the mid lower back and radiates into bilateral buttock and lower extremities into her feet.  She reports that the right lower extremity pain is worse in the left.  She describes the pain as constant and varying in character in intensity.  She denies any recent injury or trauma.  She reports that she has a follow-up with a pain specialist this upcoming week.  She reports that in the past she is taken Norco and muscle relaxers with relief of symptoms.  Patient also reports associated numbness and tingling in bilateral lower extremities and feet with her pain.  She reports she is not taking  "gabapentin recently, however, she has taken in the past.  She reports that she is continued to be ambulatory with a rolling walker.  Although prolonged walking or sitting in the same position does worsen her pain.  Denies bladder or bowel incontinence.    Patient has a history of chronic lower back pain in his frequently in the ER for chronic pain.  Norco 7.5 and Flexeril given in ED with relief of symptoms.  Patient reports that she "finally feels like she is able to rest."We will discharge home with Medrol Dosepak, Norco, Robaxin, and gabapentin.  Recommended that she does see the specialist this upcoming week.  Recommend that she follows up with her PCP for continued medication management and/or additional specialist referrals as needed.  At this time, I do not believe that any additional workup or imaging is warranted as the patient does have a history of this pain and has no new trauma or injury.  Discussed treatment plan with the patient she was in agreement.  We will discharge home.    Risk  Prescription drug management.      Additional MDM:   Differential Diagnosis:   Other: The following diagnoses were also considered and will be evaluated: Sciatica, Trauma and HNP.                                Clinical Impression:  Final diagnoses:  [M54.42, M54.41, G89.29] Chronic bilateral low back pain with bilateral sciatica (Primary)          ED Disposition Condition    Discharge Stable          ED Prescriptions       Medication Sig Dispense Start Date End Date Auth. Provider    HYDROcodone-acetaminophen (NORCO) 7.5-325 mg per tablet Take 1 tablet by mouth every 8 (eight) hours as needed for Pain. 12 tablet 11/25/2023 -- Bushra Jackson PA    methylPREDNISolone (MEDROL DOSEPACK) 4 mg tablet use as directed 21 each 11/25/2023 12/16/2023 Bushra Jackson PA    methocarbamoL (ROBAXIN) 750 MG Tab Take 1 tablet (750 mg total) by mouth 2 (two) times daily as needed (muscle pain). 20 tablet 11/25/2023 12/5/2023 Bushra Jackson" PA    gabapentin (NEURONTIN) 300 MG capsule Take 1 capsule (300 mg total) by mouth 3 (three) times daily. 90 capsule 11/25/2023 12/25/2023 Bushra Jackson PA          Follow-up Information       Follow up With Specialties Details Why Contact Info    Tiffany Harris, [facility] Call in 1 day for continued management and specialist referral [id number] [facility] [id number]  [phone]      Primary Care  Call in 1 day to get set up with primary care provider Call [phone] to set up primary care appointment if you do not have a primary care provider             Bushra Jackson PA  11/25/23 2110

## 2023-11-27 ENCOUNTER — OFFICE VISIT (OUTPATIENT)
Dept: UROLOGY | Facility: CLINIC | Age: 68
End: 2023-11-27
Payer: MEDICARE

## 2023-11-27 ENCOUNTER — TELEPHONE (OUTPATIENT)
Dept: ORTHOPEDICS | Facility: CLINIC | Age: 68
End: 2023-11-27
Payer: MEDICARE

## 2023-11-27 VITALS
SYSTOLIC BLOOD PRESSURE: 123 MMHG | HEART RATE: 78 BPM | WEIGHT: 266.63 LBS | OXYGEN SATURATION: 99 % | HEIGHT: 66 IN | DIASTOLIC BLOOD PRESSURE: 67 MMHG | BODY MASS INDEX: 42.85 KG/M2 | TEMPERATURE: 98 F

## 2023-11-27 DIAGNOSIS — R82.90 ABNORMAL URINALYSIS: ICD-10-CM

## 2023-11-27 DIAGNOSIS — N30.90 RECURRENT CYSTITIS: Primary | ICD-10-CM

## 2023-11-27 DIAGNOSIS — R29.898 WEAKNESS OF BOTH LOWER EXTREMITIES: ICD-10-CM

## 2023-11-27 LAB
BILIRUB SERPL-MCNC: NEGATIVE MG/DL
BLOOD URINE, POC: NORMAL
COLOR, POC UA: NORMAL
GLUCOSE UR QL STRIP: NEGATIVE
KETONES UR QL STRIP: NEGATIVE
LEUKOCYTE ESTERASE URINE, POC: NORMAL
NITRITE, POC UA: POSITIVE
PH, POC UA: 6
PROTEIN, POC: NEGATIVE
SPECIFIC GRAVITY, POC UA: 1.02
UROBILINOGEN, POC UA: 0.2

## 2023-11-27 PROCEDURE — 1125F PR PAIN SEVERITY QUANTIFIED, PAIN PRESENT: ICD-10-PCS | Mod: CPTII,,, | Performed by: UROLOGY

## 2023-11-27 PROCEDURE — 3288F FALL RISK ASSESSMENT DOCD: CPT | Mod: CPTII,,, | Performed by: UROLOGY

## 2023-11-27 PROCEDURE — 81001 URINALYSIS AUTO W/SCOPE: CPT | Mod: PBBFAC | Performed by: UROLOGY

## 2023-11-27 PROCEDURE — 1159F MED LIST DOCD IN RCRD: CPT | Mod: CPTII,,, | Performed by: UROLOGY

## 2023-11-27 PROCEDURE — 3066F NEPHROPATHY DOC TX: CPT | Mod: CPTII,,, | Performed by: UROLOGY

## 2023-11-27 PROCEDURE — 3060F POS MICROALBUMINURIA REV: CPT | Mod: CPTII,,, | Performed by: UROLOGY

## 2023-11-27 PROCEDURE — 87186 SC STD MICRODIL/AGAR DIL: CPT | Performed by: UROLOGY

## 2023-11-27 PROCEDURE — 3008F PR BODY MASS INDEX (BMI) DOCUMENTED: ICD-10-PCS | Mod: CPTII,,, | Performed by: UROLOGY

## 2023-11-27 PROCEDURE — 1100F PR PT FALLS ASSESS DOC 2+ FALLS/FALL W/INJURY/YR: ICD-10-PCS | Mod: CPTII,,, | Performed by: UROLOGY

## 2023-11-27 PROCEDURE — 3288F PR FALLS RISK ASSESSMENT DOCUMENTED: ICD-10-PCS | Mod: CPTII,,, | Performed by: UROLOGY

## 2023-11-27 PROCEDURE — 1125F AMNT PAIN NOTED PAIN PRSNT: CPT | Mod: CPTII,,, | Performed by: UROLOGY

## 2023-11-27 PROCEDURE — 3074F PR MOST RECENT SYSTOLIC BLOOD PRESSURE < 130 MM HG: ICD-10-PCS | Mod: CPTII,,, | Performed by: UROLOGY

## 2023-11-27 PROCEDURE — 3060F PR POS MICROALBUMINURIA RESULT DOCUMENTED/REVIEW: ICD-10-PCS | Mod: CPTII,,, | Performed by: UROLOGY

## 2023-11-27 PROCEDURE — 3066F PR DOCUMENTATION OF TREATMENT FOR NEPHROPATHY: ICD-10-PCS | Mod: CPTII,,, | Performed by: UROLOGY

## 2023-11-27 PROCEDURE — 1160F PR REVIEW ALL MEDS BY PRESCRIBER/CLIN PHARMACIST DOCUMENTED: ICD-10-PCS | Mod: CPTII,,, | Performed by: UROLOGY

## 2023-11-27 PROCEDURE — 3074F SYST BP LT 130 MM HG: CPT | Mod: CPTII,,, | Performed by: UROLOGY

## 2023-11-27 PROCEDURE — 87086 URINE CULTURE/COLONY COUNT: CPT | Performed by: UROLOGY

## 2023-11-27 PROCEDURE — 3008F BODY MASS INDEX DOCD: CPT | Mod: CPTII,,, | Performed by: UROLOGY

## 2023-11-27 PROCEDURE — 99214 OFFICE O/P EST MOD 30 MIN: CPT | Mod: S$PBB,,, | Performed by: UROLOGY

## 2023-11-27 PROCEDURE — 99214 PR OFFICE/OUTPT VISIT, EST, LEVL IV, 30-39 MIN: ICD-10-PCS | Mod: S$PBB,,, | Performed by: UROLOGY

## 2023-11-27 PROCEDURE — 3078F PR MOST RECENT DIASTOLIC BLOOD PRESSURE < 80 MM HG: ICD-10-PCS | Mod: CPTII,,, | Performed by: UROLOGY

## 2023-11-27 PROCEDURE — 1160F RVW MEDS BY RX/DR IN RCRD: CPT | Mod: CPTII,,, | Performed by: UROLOGY

## 2023-11-27 PROCEDURE — 99214 OFFICE O/P EST MOD 30 MIN: CPT | Mod: PBBFAC | Performed by: UROLOGY

## 2023-11-27 PROCEDURE — 3078F DIAST BP <80 MM HG: CPT | Mod: CPTII,,, | Performed by: UROLOGY

## 2023-11-27 PROCEDURE — 1159F PR MEDICATION LIST DOCUMENTED IN MEDICAL RECORD: ICD-10-PCS | Mod: CPTII,,, | Performed by: UROLOGY

## 2023-11-27 PROCEDURE — 1100F PTFALLS ASSESS-DOCD GE2>/YR: CPT | Mod: CPTII,,, | Performed by: UROLOGY

## 2023-11-27 NOTE — TELEPHONE ENCOUNTER
Patients daughtermee called about physical therapy. Advised that there are a few locations close to her home. She will call different locations to see where to bring her for therapy.     Daughter verbalized understanding and will call with any questions or concerns.

## 2023-11-27 NOTE — PROGRESS NOTES
CC:  Recurrent urinary tract infections    HPI:  Leatha Martinez is a 68 y.o. female here for follow-up of recurrent urinary tract infections.  She has a history of recurring urinary tract infections.  She is been on Macrodantin prophylaxis for the last six months.  She states that while she is been on this she is not had any symptoms of dysuria or thinking that she may have an infection.  She had a CT scan which was negative in November of 2022 and also a cystoscopy three months ago which showed some cystitis cystica but no other concerning lesions.  The urethra was noted to be retracted into the vagina.   She is complaining of leg weakness and has fallen three times.  She isn't sure what the cause of this is.        Urinalysis:    Results for orders placed or performed in visit on 11/27/23   POCT URINE DIPSTICK WITH MICROSCOPE, AUTOMATED   Result Value Ref Range    Color, UA Light Yellow     Spec Grav UA 1.020     pH, UA 6.0     WBC, UA Small     Nitrite, UA Positive     Protein, POC Negative     Glucose, UA Negative     Ketones, UA Negative     Urobilinogen, UA 0.2     Bilirubin, POC Negative     Blood, UA Trace-intact      Microscopic: 6-7 WBC, 10-15 bacteria, 3-4 squamous epithelial cells per HPF    ROS:  All systems reviewed and are negative except as documented in HPI and/or Assessment and Plan.     Patient Active Problem List:     Patient Active Problem List   Diagnosis    Hypertension    Gastro-esophageal reflux disease without esophagitis    Other hyperlipidemia    Class 3 severe obesity due to excess calories with serious comorbidity and body mass index (BMI) of 45.0 to 49.9 in adult    Personal history of other venous thrombosis and embolism    Personal history of COVID-19    Abnormal TSH    Leukocytosis    Post-COVID chronic dyspnea        Past Medical History:  Past Medical History:   Diagnosis Date    Hyperlipidemia     Hypertension     Personal history of colonic polyps 04/14/2022        Past Surgical  History:  Past Surgical History:   Procedure Laterality Date    COLONOSCOPY  04/14/2022        Family History:  Family History   Problem Relation Age of Onset    Heart attack Mother     Thyroid disease Mother     Diabetes Father     Thyroid disease Father     Heart attack Father         Social History:  Social History     Socioeconomic History    Marital status: Single    Number of children: 3   Tobacco Use    Smoking status: Never     Passive exposure: Current    Smokeless tobacco: Never   Substance and Sexual Activity    Alcohol use: Never    Drug use: Never    Sexual activity: Not Currently     Partners: Male     Social Determinants of Health     Financial Resource Strain: Medium Risk (4/4/2023)    Overall Financial Resource Strain (CARDIA)     Difficulty of Paying Living Expenses: Somewhat hard   Food Insecurity: No Food Insecurity (4/4/2023)    Hunger Vital Sign     Worried About Running Out of Food in the Last Year: Never true     Ran Out of Food in the Last Year: Never true   Transportation Needs: Unmet Transportation Needs (4/4/2023)    PRAPARE - Transportation     Lack of Transportation (Medical): Yes     Lack of Transportation (Non-Medical): Yes   Physical Activity: Unknown (4/4/2023)    Exercise Vital Sign     Days of Exercise per Week: 3 days   Stress: Stress Concern Present (4/4/2023)    [employer] - Occupational Stress Questionnaire     Feeling of Stress : To some extent   Social Connections: Unknown (4/4/2023)    Social Connection and Isolation Panel [NHANES]     Frequency of Communication with Friends and Family: Three times a week     Frequency of Social Gatherings with Friends and Family: Three times a week     Attends [employer] Services: 1 to 4 times per year     Active Member of Clubs or Organizations: No     Attends Club or Organization Meetings: Never   Housing Stability: Low Risk  (4/4/2023)    Housing Stability Vital Sign     Unable to Pay for Housing in the Last  Year: No     Number of Places Lived in the Last Year: 1     Unstable Housing in the Last Year: No        Allergies:  Review of patient's allergies indicates:   Allergen Reactions    Penicillins Hives    Aspirin Rash    Keflex [cephalexin] Rash    Tramadol Rash        Objective:  Vitals:    11/27/23 0851   BP: 123/67   Pulse: 78   Temp: 98.3 °F (36.8 °C)     General:  Well developed, well nourished adult female in no acute distress  Abdomen: Soft, nontender, no masses  Extremities:  No clubbing, cyanosis, or edema  Neurologic:  Grossly intact  Musculoskeletal:  Normal tone      Assessment:  1. Recurrent cystitis  - POCT URINE DIPSTICK WITH MICROSCOPE, AUTOMATED    2. Abnormal urinalysis  - Urine culture    3. Weakness of both lower extremities       Plan:  Continue with the nitrofurantoin suppression.  Depending on the results of the culture the antibiotic maybe changed.  Urine culture was sent today.  We will call and treat if that is positive.  I encouraged her to see her primary care especially if the urine culture is negative or this does not improve after treatment of an infection if it is present.    Follow-up:    Three months.

## 2023-11-27 NOTE — PROGRESS NOTES
Patient seen by Dr. JALEN Greene. Will return in 3 months. Written and verbal discharge instructions given

## 2023-11-28 ENCOUNTER — OFFICE VISIT (OUTPATIENT)
Dept: CARDIOLOGY | Facility: CLINIC | Age: 68
End: 2023-11-28
Payer: MEDICARE

## 2023-11-28 VITALS
TEMPERATURE: 98 F | WEIGHT: 260.13 LBS | BODY MASS INDEX: 43.34 KG/M2 | RESPIRATION RATE: 18 BRPM | OXYGEN SATURATION: 99 % | HEIGHT: 65 IN | SYSTOLIC BLOOD PRESSURE: 118 MMHG | HEART RATE: 70 BPM | DIASTOLIC BLOOD PRESSURE: 71 MMHG

## 2023-11-28 DIAGNOSIS — E78.49 OTHER HYPERLIPIDEMIA: Chronic | ICD-10-CM

## 2023-11-28 DIAGNOSIS — I10 PRIMARY HYPERTENSION: Primary | ICD-10-CM

## 2023-11-28 DIAGNOSIS — U09.9 POST-COVID CHRONIC DYSPNEA: ICD-10-CM

## 2023-11-28 DIAGNOSIS — R06.09 POST-COVID CHRONIC DYSPNEA: ICD-10-CM

## 2023-11-28 DIAGNOSIS — R06.09 DYSPNEA ON EXERTION: ICD-10-CM

## 2023-11-28 PROCEDURE — 3008F PR BODY MASS INDEX (BMI) DOCUMENTED: ICD-10-PCS | Mod: CPTII,,,

## 2023-11-28 PROCEDURE — 1126F AMNT PAIN NOTED NONE PRSNT: CPT | Mod: CPTII,,,

## 2023-11-28 PROCEDURE — 1159F MED LIST DOCD IN RCRD: CPT | Mod: CPTII,,,

## 2023-11-28 PROCEDURE — 99215 OFFICE O/P EST HI 40 MIN: CPT | Mod: PBBFAC

## 2023-11-28 PROCEDURE — 3060F PR POS MICROALBUMINURIA RESULT DOCUMENTED/REVIEW: ICD-10-PCS | Mod: CPTII,,,

## 2023-11-28 PROCEDURE — 3008F BODY MASS INDEX DOCD: CPT | Mod: CPTII,,,

## 2023-11-28 PROCEDURE — 3066F PR DOCUMENTATION OF TREATMENT FOR NEPHROPATHY: ICD-10-PCS | Mod: CPTII,,,

## 2023-11-28 PROCEDURE — 1101F PT FALLS ASSESS-DOCD LE1/YR: CPT | Mod: CPTII,,,

## 2023-11-28 PROCEDURE — 3074F PR MOST RECENT SYSTOLIC BLOOD PRESSURE < 130 MM HG: ICD-10-PCS | Mod: CPTII,,,

## 2023-11-28 PROCEDURE — 1160F PR REVIEW ALL MEDS BY PRESCRIBER/CLIN PHARMACIST DOCUMENTED: ICD-10-PCS | Mod: CPTII,,,

## 2023-11-28 PROCEDURE — 3066F NEPHROPATHY DOC TX: CPT | Mod: CPTII,,,

## 2023-11-28 PROCEDURE — 1101F PR PT FALLS ASSESS DOC 0-1 FALLS W/OUT INJ PAST YR: ICD-10-PCS | Mod: CPTII,,,

## 2023-11-28 PROCEDURE — 3288F PR FALLS RISK ASSESSMENT DOCUMENTED: ICD-10-PCS | Mod: CPTII,,,

## 2023-11-28 PROCEDURE — 3078F PR MOST RECENT DIASTOLIC BLOOD PRESSURE < 80 MM HG: ICD-10-PCS | Mod: CPTII,,,

## 2023-11-28 PROCEDURE — 3288F FALL RISK ASSESSMENT DOCD: CPT | Mod: CPTII,,,

## 2023-11-28 PROCEDURE — 99214 OFFICE O/P EST MOD 30 MIN: CPT | Mod: S$PBB,,,

## 2023-11-28 PROCEDURE — 3074F SYST BP LT 130 MM HG: CPT | Mod: CPTII,,,

## 2023-11-28 PROCEDURE — 1126F PR PAIN SEVERITY QUANTIFIED, NO PAIN PRESENT: ICD-10-PCS | Mod: CPTII,,,

## 2023-11-28 PROCEDURE — 1160F RVW MEDS BY RX/DR IN RCRD: CPT | Mod: CPTII,,,

## 2023-11-28 PROCEDURE — 93005 ELECTROCARDIOGRAM TRACING: CPT

## 2023-11-28 PROCEDURE — 3078F DIAST BP <80 MM HG: CPT | Mod: CPTII,,,

## 2023-11-28 PROCEDURE — 3060F POS MICROALBUMINURIA REV: CPT | Mod: CPTII,,,

## 2023-11-28 PROCEDURE — 1159F PR MEDICATION LIST DOCUMENTED IN MEDICAL RECORD: ICD-10-PCS | Mod: CPTII,,,

## 2023-11-28 PROCEDURE — 99214 PR OFFICE/OUTPT VISIT, EST, LEVL IV, 30-39 MIN: ICD-10-PCS | Mod: S$PBB,,,

## 2023-11-28 NOTE — PATIENT INSTRUCTIONS
EKG   Complete echocardiogram   Follow up in cardiology clinic in 1 year or sooner if needed  Follow up with PCP as directed

## 2023-11-29 ENCOUNTER — TELEPHONE (OUTPATIENT)
Dept: UROLOGY | Facility: CLINIC | Age: 68
End: 2023-11-29
Payer: MEDICARE

## 2023-11-29 LAB — BACTERIA UR CULT: ABNORMAL

## 2023-11-29 RX ORDER — LEVOFLOXACIN 500 MG/1
500 TABLET, FILM COATED ORAL DAILY
Qty: 7 TABLET | Refills: 0 | Status: SHIPPED | OUTPATIENT
Start: 2023-11-29

## 2023-11-29 NOTE — TELEPHONE ENCOUNTER
Urine culture grew out E coli.  I have sent Levaquin to her pharmacy.  She is on a preventative antibiotic.  Have her stop that while she is taking the Levaquin and go back on it after finishing.

## 2023-12-10 ENCOUNTER — HOSPITAL ENCOUNTER (EMERGENCY)
Facility: HOSPITAL | Age: 68
Discharge: HOME | End: 2023-12-10
Attending: EMERGENCY MEDICINE
Payer: MEDICARE

## 2023-12-10 VITALS
HEIGHT: 66 IN | BODY MASS INDEX: 42.75 KG/M2 | WEIGHT: 266 LBS | DIASTOLIC BLOOD PRESSURE: 65 MMHG | RESPIRATION RATE: 18 BRPM | SYSTOLIC BLOOD PRESSURE: 137 MMHG | TEMPERATURE: 98 F | HEART RATE: 73 BPM | OXYGEN SATURATION: 100 %

## 2023-12-10 DIAGNOSIS — M54.9 BACK PAIN, UNSPECIFIED BACK LOCATION, UNSPECIFIED BACK PAIN LATERALITY, UNSPECIFIED CHRONICITY: Primary | ICD-10-CM

## 2023-12-10 PROCEDURE — 99284 EMERGENCY DEPT VISIT MOD MDM: CPT

## 2023-12-10 RX ORDER — METHOCARBAMOL 500 MG/1
1000 TABLET, FILM COATED ORAL 3 TIMES DAILY
Qty: 30 TABLET | Refills: 0 | Status: SHIPPED | OUTPATIENT
Start: 2023-12-10 | End: 2023-12-15

## 2023-12-10 RX ORDER — LIDOCAINE 50 MG/G
1 PATCH TOPICAL DAILY
Qty: 10 PATCH | Refills: 0 | Status: SHIPPED | OUTPATIENT
Start: 2023-12-10

## 2023-12-10 NOTE — ED PROVIDER NOTES
Encounter Date: 12/10/2023       History     Chief Complaint   Patient presents with    Back Pain     C/o back bilateral leg and arm pain x 3 year states that this pain comes and goes and that tonight is feels worse.      Is a 68-year-old female with chronic lower back pain who presents to the emergency department today complaining of her chronic lower back pain.  Patient says that she is pretty previously described Norco but she is out of it.  She has no new neurologic symptoms just her chronic back pain.  The patient had CT scans performed in March and in October of this year both showed degenerative changes no acute fractures.  Patient is seen ambulating with a walker in the emergency department which is her baseline.  Patient has no new bowel or bladder incontinence no fever.  Did tell the patient that we are not able to prescribe anymore [place] for her but we would prescribe perhaps a lidocaine patch and some muscle relaxers she does get followed up at [facility] she was encouraged to follow up with her primary care doctor this week.      Review of patient's allergies indicates:   Allergen Reactions    Penicillins Hives    Aspirin Rash    Keflex [cephalexin] Rash    Tramadol Rash     Past Medical History:   Diagnosis Date    Hyperlipidemia     Hypertension     Personal history of colonic polyps 04/14/2022     Past Surgical History:   Procedure Laterality Date    COLONOSCOPY  04/14/2022     Family History   Problem Relation Age of Onset    Heart attack Mother     Thyroid disease Mother     Diabetes Father     Thyroid disease Father     Heart attack Father      Social History     Tobacco Use    Smoking status: Never     Passive exposure: Current    Smokeless tobacco: Never   Substance Use Topics    Alcohol use: Never    Drug use: Never     Review of Systems   Constitutional:  Negative for fever.   Musculoskeletal:  Positive for back pain.       Physical Exam     Initial Vitals [12/10/23 0216]   BP Pulse Resp Temp SpO2    137/65 73 18 97.6 °F (36.4 °C) 100 %      MAP       --         Physical Exam    HENT:   Head: Normocephalic.   Eyes: EOM are normal. Left eye exhibits no discharge. No scleral icterus.   Cardiovascular:  Regular rhythm.           Pulmonary/Chest: No stridor. No respiratory distress.   Abdominal: She exhibits no distension.   Musculoskeletal:      Comments: Paraspinus upper lumbar tenderness, no ml tenderness     Neurological: She is alert and oriented to person, place, and time. She has normal strength.   Skin: Skin is dry. No rash noted. No erythema. No pallor.   Psychiatric: She has a normal mood and affect. Her behavior is normal. Judgment and thought content normal.         ED Course   Procedures  Labs Reviewed - No data to display       Imaging Results    None          Medications - No data to display  Medical Decision Making  Amount and/or Complexity of Data Reviewed  External Data Reviewed: radiology.     Details: Old ct's reviewed, no fxr or lytic changes    Risk  Prescription drug management.                                      Clinical Impression:  Final diagnoses:  [M54.9] Back pain, unspecified back location, unspecified back pain laterality, unspecified chronicity (Primary)          ED Disposition Condition    Discharge Stable          ED Prescriptions       Medication Sig Dispense Start Date End Date Auth. Provider    LIDOcaine (LIDODERM) 5 % Place 1 patch onto the skin once daily. Remove & Discard patch within 12 hours or as directed by MD 10 patch 12/10/2023 -- Sebastian Collins MD    methocarbamoL (ROBAXIN) 500 MG Tab Take 2 tablets (1,000 mg total) by mouth 3 (three) times daily. for 5 days 30 tablet 12/10/2023 12/15/2023 Sebastian Collins MD          Follow-up Information    None          Sebastian Collins MD  12/10/23 [id number]

## 2023-12-21 ENCOUNTER — HOSPITAL ENCOUNTER (OUTPATIENT)
Dept: CARDIOLOGY | Facility: HOSPITAL | Age: 68
Discharge: HOME | End: 2023-12-21
Payer: MEDICARE

## 2023-12-21 VITALS
WEIGHT: 266 LBS | SYSTOLIC BLOOD PRESSURE: 157 MMHG | DIASTOLIC BLOOD PRESSURE: 67 MMHG | BODY MASS INDEX: 42.75 KG/M2 | HEIGHT: 66 IN

## 2023-12-21 DIAGNOSIS — R06.09 DYSPNEA ON EXERTION: ICD-10-CM

## 2023-12-21 LAB
AV INDEX (PROSTH): 0.89
AV MEAN GRADIENT: 7 MMHG
AV PEAK GRADIENT: 13 MMHG
AV VALVE AREA BY VELOCITY RATIO: 2.03 CM²
AV VALVE AREA: 2.7 CM²
AV VELOCITY RATIO: 0.67
BSA FOR ECHO PROCEDURE: 2.37 M2
CV ECHO LV RWT: 0.57 CM
DOP CALC AO PEAK VEL: 1.83 M/S
DOP CALC AO VTI: 29.8 CM
DOP CALC LVOT AREA: 3 CM2
DOP CALC LVOT DIAMETER: 1.97 CM
DOP CALC LVOT PEAK VEL: 1.22 M/S
DOP CALC LVOT STROKE VOLUME: 80.43 CM3
DOP CALC MV VTI: 16.3 CM
DOP CALCLVOT PEAK VEL VTI: 26.4 CM
E WAVE DECELERATION TIME: 400.41 MSEC
E/A RATIO: 0.46
ECHO LV POSTERIOR WALL: 1.42 CM (ref 0.6–1.1)
FRACTIONAL SHORTENING: 43 % (ref 28–44)
HR MV ECHO: 71 BPM
INTERVENTRICULAR SEPTUM: 1.25 CM (ref 0.6–1.1)
LEFT ATRIUM SIZE: 3.97 CM
LEFT ATRIUM VOLUME INDEX MOD: 13.7 ML/M2
LEFT ATRIUM VOLUME MOD: 31.02 CM3
LEFT INTERNAL DIMENSION IN SYSTOLE: 2.8 CM (ref 2.1–4)
LEFT VENTRICLE DIASTOLIC VOLUME INDEX: 50.78 ML/M2
LEFT VENTRICLE DIASTOLIC VOLUME: 114.76 ML
LEFT VENTRICLE MASS INDEX: 118 G/M2
LEFT VENTRICLE SYSTOLIC VOLUME INDEX: 13.1 ML/M2
LEFT VENTRICLE SYSTOLIC VOLUME: 29.58 ML
LEFT VENTRICULAR INTERNAL DIMENSION IN DIASTOLE: 4.94 CM (ref 3.5–6)
LEFT VENTRICULAR MASS: 266.97 G
LV LATERAL E/E' RATIO: 4.5 M/S
LVOT MG: 2.63 MMHG
LVOT MV: 0.75 CM/S
MV MEAN GRADIENT: 1 MMHG
MV PEAK A VEL: 0.79 M/S
MV PEAK E VEL: 0.36 M/S
MV PEAK GRADIENT: 3 MMHG
MV STENOSIS PRESSURE HALF TIME: 116.12 MS
MV VALVE AREA BY CONTINUITY EQUATION: 4.93 CM2
MV VALVE AREA P 1/2 METHOD: 1.89 CM2
OHS LV EJECTION FRACTION SIMPSONS BIPLANE MOD: 72 %
PISA MRMAX VEL: 4.73 M/S
PISA TR MAX VEL: 3.17 M/S
RA MAJOR: 4.4 CM
TDI LATERAL: 0.08 M/S
TR MAX PG: 40 MMHG
TRICUSPID ANNULAR PLANE SYSTOLIC EXCURSION: 2.23 CM
Z-SCORE OF LEFT VENTRICULAR DIMENSION IN END DIASTOLE: -5.18
Z-SCORE OF LEFT VENTRICULAR DIMENSION IN END SYSTOLE: -4.61

## 2023-12-21 PROCEDURE — 93306 TTE W/DOPPLER COMPLETE: CPT

## 2023-12-30 ENCOUNTER — HOSPITAL ENCOUNTER (EMERGENCY)
Facility: HOSPITAL | Age: 68
Discharge: HOME | End: 2023-12-31
Attending: STUDENT IN AN ORGANIZED HEALTH CARE EDUCATION/TRAINING PROGRAM
Payer: MEDICARE

## 2023-12-30 DIAGNOSIS — M54.41 CHRONIC BILATERAL LOW BACK PAIN WITH BILATERAL SCIATICA: Primary | ICD-10-CM

## 2023-12-30 DIAGNOSIS — G89.29 CHRONIC BILATERAL LOW BACK PAIN WITH BILATERAL SCIATICA: Primary | ICD-10-CM

## 2023-12-30 DIAGNOSIS — M54.42 CHRONIC BILATERAL LOW BACK PAIN WITH BILATERAL SCIATICA: Primary | ICD-10-CM

## 2023-12-30 LAB
FLUAV AG UPPER RESP QL IA.RAPID: NOT DETECTED
FLUBV AG UPPER RESP QL IA.RAPID: NOT DETECTED
RSV A 5' UTR RNA NPH QL NAA+PROBE: NOT DETECTED
SARS-COV-2 RNA RESP QL NAA+PROBE: NOT DETECTED

## 2023-12-30 PROCEDURE — 84484 ASSAY OF TROPONIN QUANT: CPT | Performed by: EMERGENCY MEDICINE

## 2023-12-30 PROCEDURE — 83880 ASSAY OF NATRIURETIC PEPTIDE: CPT | Performed by: EMERGENCY MEDICINE

## 2023-12-30 PROCEDURE — 93005 ELECTROCARDIOGRAM TRACING: CPT | Performed by: INTERNAL MEDICINE

## 2023-12-30 PROCEDURE — 85610 PROTHROMBIN TIME: CPT | Performed by: EMERGENCY MEDICINE

## 2023-12-30 PROCEDURE — 99285 EMERGENCY DEPT VISIT HI MDM: CPT | Mod: 25

## 2023-12-30 PROCEDURE — 0241U COVID/RSV/FLU A&B PCR: CPT | Performed by: EMERGENCY MEDICINE

## 2023-12-30 PROCEDURE — 93005 ELECTROCARDIOGRAM TRACING: CPT

## 2023-12-30 PROCEDURE — 85025 COMPLETE CBC W/AUTO DIFF WBC: CPT | Performed by: EMERGENCY MEDICINE

## 2023-12-30 PROCEDURE — 80053 COMPREHEN METABOLIC PANEL: CPT | Performed by: EMERGENCY MEDICINE

## 2023-12-31 VITALS
HEART RATE: 70 BPM | BODY MASS INDEX: 42.43 KG/M2 | WEIGHT: 264 LBS | SYSTOLIC BLOOD PRESSURE: 157 MMHG | RESPIRATION RATE: 19 BRPM | HEIGHT: 66 IN | TEMPERATURE: 98 F | DIASTOLIC BLOOD PRESSURE: 78 MMHG | OXYGEN SATURATION: 100 %

## 2023-12-31 LAB
ALBUMIN SERPL-MCNC: 3.3 G/DL (ref 3.4–4.8)
ALBUMIN/GLOB SERPL: 0.8 RATIO (ref 1.1–2)
ALP SERPL-CCNC: 132 UNIT/L (ref 40–150)
ALT SERPL-CCNC: 14 UNIT/L (ref 0–55)
AST SERPL-CCNC: 15 UNIT/L (ref 5–34)
BASOPHILS # BLD AUTO: 0.02 X10(3)/MCL
BASOPHILS NFR BLD AUTO: 0.2 %
BILIRUB SERPL-MCNC: 0.2 MG/DL
BNP BLD-MCNC: 67.5 PG/ML
BUN SERPL-MCNC: 12.7 MG/DL (ref 9.8–20.1)
CALCIUM SERPL-MCNC: 8.4 MG/DL (ref 8.4–10.2)
CHLORIDE SERPL-SCNC: 105 MMOL/L (ref 98–107)
CO2 SERPL-SCNC: 27 MMOL/L (ref 23–31)
CREAT SERPL-MCNC: 0.69 MG/DL (ref 0.55–1.02)
EOSINOPHIL # BLD AUTO: 0.19 X10(3)/MCL (ref 0–0.9)
EOSINOPHIL NFR BLD AUTO: 2 %
ERYTHROCYTE [DISTWIDTH] IN BLOOD BY AUTOMATED COUNT: 16.7 % (ref 11.5–17)
GFR SERPLBLD CREATININE-BSD FMLA CKD-EPI: >60 MLS/MIN/1.73/M2
GLOBULIN SER-MCNC: 4 GM/DL (ref 2.4–3.5)
GLUCOSE SERPL-MCNC: 92 MG/DL (ref 82–115)
HCT VFR BLD AUTO: 32.4 % (ref 37–47)
HGB BLD-MCNC: 9.9 G/DL (ref 12–16)
IMM GRANULOCYTES # BLD AUTO: 0.04 X10(3)/MCL (ref 0–0.04)
IMM GRANULOCYTES NFR BLD AUTO: 0.4 %
INR PPP: 1
LYMPHOCYTES # BLD AUTO: 2.31 X10(3)/MCL (ref 0.6–4.6)
LYMPHOCYTES NFR BLD AUTO: 24.9 %
MCH RBC QN AUTO: 23 PG (ref 27–31)
MCHC RBC AUTO-ENTMCNC: 30.6 G/DL (ref 33–36)
MCV RBC AUTO: 75.2 FL (ref 80–94)
MONOCYTES # BLD AUTO: 0.6 X10(3)/MCL (ref 0.1–1.3)
MONOCYTES NFR BLD AUTO: 6.5 %
NEUTROPHILS # BLD AUTO: 6.11 X10(3)/MCL (ref 2.1–9.2)
NEUTROPHILS NFR BLD AUTO: 66 %
NRBC BLD AUTO-RTO: 0 %
PLATELET # BLD AUTO: 249 X10(3)/MCL (ref 130–400)
PMV BLD AUTO: 10.6 FL (ref 7.4–10.4)
POTASSIUM SERPL-SCNC: 4.6 MMOL/L (ref 3.5–5.1)
PROT SERPL-MCNC: 7.3 GM/DL (ref 5.8–7.6)
PROTHROMBIN TIME: 13.3 SECONDS (ref 12.5–14.5)
RBC # BLD AUTO: 4.31 X10(6)/MCL (ref 4.2–5.4)
SODIUM SERPL-SCNC: 142 MMOL/L (ref 136–145)
TROPONIN I SERPL-MCNC: <0.01 NG/ML (ref 0–0.04)
WBC # SPEC AUTO: 9.27 X10(3)/MCL (ref 4.5–11.5)

## 2023-12-31 PROCEDURE — 25000003 PHARM REV CODE 250: Performed by: STUDENT IN AN ORGANIZED HEALTH CARE EDUCATION/TRAINING PROGRAM

## 2023-12-31 RX ORDER — METHOCARBAMOL 500 MG/1
1000 TABLET, FILM COATED ORAL
Status: COMPLETED | OUTPATIENT
Start: 2023-12-31 | End: 2023-12-31

## 2023-12-31 RX ORDER — ACETAMINOPHEN 500 MG
1000 TABLET ORAL
Status: COMPLETED | OUTPATIENT
Start: 2023-12-31 | End: 2023-12-31

## 2023-12-31 RX ORDER — HYDROCODONE BITARTRATE AND ACETAMINOPHEN 5; 325 MG/1; MG/1
1 TABLET ORAL EVERY 8 HOURS PRN
Qty: 6 TABLET | Refills: 0 | Status: SHIPPED | OUTPATIENT
Start: 2023-12-31 | End: 2024-01-02

## 2023-12-31 RX ADMIN — METHOCARBAMOL 1000 MG: 500 TABLET ORAL at 12:12

## 2023-12-31 RX ADMIN — ACETAMINOPHEN 1000 MG: 500 TABLET ORAL at 12:12

## 2023-12-31 NOTE — ED PROVIDER NOTES
Encounter Date: 12/30/2023       History     Chief Complaint   Patient presents with    Generalized Body Aches     C/o body aches x 2 days worse in her shoulders to hands and hips to her knees. with SOB she no chest pain no n/v/d      68-year-old female with a history of hyperlipidemia, hypertension and chronic lower back pain presents to the ED lower back pain radiating to bilateral legs as well as bilateral shoulder pain.  Patient states that when the pain is exacerbated, she feels short of breath however denies this at baseline.  Denies chest pain, nausea, vomiting, fever, chills, injury, fall or trauma.  Patient states that this has happened in the past and she was given pain medication as well as muscle relaxers however she was out of these medications.  Denies bowel or bladder incontinence, saddle paresthesia, numbness, tingling weakness.    The history is provided by the patient. No  was used.     Review of patient's allergies indicates:   Allergen Reactions    Penicillins Hives    Aspirin Rash    Keflex [cephalexin] Rash    Tramadol Rash     Past Medical History:   Diagnosis Date    Hyperlipidemia     Hypertension     Other pulmonary embolism without acute cor pulmonale     Personal history of colonic polyps 04/14/2022     Past Surgical History:   Procedure Laterality Date    COLONOSCOPY  04/14/2022     Family History   Problem Relation Age of Onset    Heart attack Mother     Thyroid disease Mother     Diabetes Father     Thyroid disease Father     Heart attack Father      Social History     Tobacco Use    Smoking status: Never     Passive exposure: Current    Smokeless tobacco: Never   Substance Use Topics    Alcohol use: Never    Drug use: Never     Review of Systems   Constitutional:  Negative for chills and fever.   Eyes:  Negative for visual disturbance.   Respiratory:  Positive for shortness of breath. Negative for cough.    Cardiovascular:  Negative for chest pain.    Gastrointestinal:  Negative for abdominal pain, nausea and vomiting.   Genitourinary:  Negative for dysuria.   Musculoskeletal:  Positive for arthralgias and back pain.   Skin:  Negative for color change and rash.   Neurological:  Negative for dizziness and headaches.   Psychiatric/Behavioral:  Negative for behavioral problems.    All other systems reviewed and are negative.      Physical Exam     Initial Vitals [12/30/23 2230]   BP Pulse Resp Temp SpO2   (!) 161/79 75 (!) 24 98.5 °F (36.9 °C) 100 %      MAP       --         Physical Exam    Nursing note and vitals reviewed.  Constitutional: She appears well-developed and well-nourished.   Patient sitting in rollator walker    HENT:   Head: Normocephalic and atraumatic.   Eyes: EOM are normal. Pupils are equal, round, and reactive to light.   Neck: Trachea normal and phonation normal. Neck supple.   Normal range of motion.   Full passive range of motion without pain.     Cardiovascular:  Normal rate, regular rhythm, normal heart sounds and intact distal pulses.           Pulmonary/Chest: Breath sounds normal. No respiratory distress. She has no wheezes. She has no rhonchi. She has no rales. She exhibits no tenderness.   Abdominal: Abdomen is soft. Bowel sounds are normal. She exhibits no distension. There is no abdominal tenderness. There is no rebound.   Musculoskeletal:         General: Normal range of motion.      Right shoulder: Normal. Normal range of motion.      Left shoulder: Normal. Normal range of motion.      Cervical back: Normal, full passive range of motion without pain, normal range of motion and neck supple.      Thoracic back: Normal.      Lumbar back: Tenderness (bilateral (R>L)) present. No spasms or bony tenderness. Normal range of motion. Negative right straight leg raise test and negative left straight leg raise test.     Neurological: She is alert and oriented to person, place, and time. She has normal strength. GCS score is 15. GCS eye  subscore is 4. GCS verbal subscore is 5. GCS motor subscore is 6.   Skin: Skin is warm and dry. Capillary refill takes less than 2 seconds.   Psychiatric: She has a normal mood and affect.         ED Course   Procedures  Labs Reviewed   COMPREHENSIVE METABOLIC PANEL - Abnormal; Notable for the following components:       Result Value    Albumin Level 3.3 (*)     Globulin 4.0 (*)     Albumin/Globulin Ratio 0.8 (*)     All other components within normal limits   CBC WITH DIFFERENTIAL - Abnormal; Notable for the following components:    Hgb 9.9 (*)     Hct 32.4 (*)     MCV 75.2 (*)     MCH 23.0 (*)     MCHC 30.6 (*)     MPV 10.6 (*)     All other components within normal limits   B-TYPE NATRIURETIC PEPTIDE - Normal   TROPONIN I - Normal   PROTIME-INR - Normal   COVID/RSV/FLU A&B PCR - Normal    Narrative:     The Xpert Xpress SARS-CoV-2/FLU/RSV plus is a rapid, multiplexed real-time PCR test intended for the simultaneous qualitative detection and differentiation of SARS-CoV-2, Influenza A, Influenza B, and respiratory syncytial virus (RSV) viral RNA in either nasopharyngeal swab or nasal swab specimens.         CBC W/ AUTO DIFFERENTIAL    Narrative:     The following orders were created for panel order CBC auto differential.  Procedure                               Abnormality         Status                     ---------                               -----------         ------                     CBC with Differential[[id number]]       Abnormal            Final result                 Please view results for these tests on the individual orders.     EKG Readings: (Independently Interpreted)   Initial Reading: No STEMI. Rhythm: Normal Sinus Rhythm. Heart Rate: 64. Ectopy: No Ectopy. Conduction: Normal. ST Segments: Normal ST Segments. T Waves: Normal. Axis: Normal.       Imaging Results              X-Ray Chest PA And Lateral (Final result)  Result time 12/31/23 09:11:43      Final result by Ottoniel Whitmore MD  (12/31/23 09:11:43)                   Impression:      No acute cardiopulmonary process.      Electronically signed by: Ottoniel Whitmore  Date:    12/31/2023  Time:    09:11               Narrative:    EXAMINATION:  XR CHEST PA AND LATERAL    CLINICAL HISTORY:  Chest Pain;    TECHNIQUE:  Two views of the chest    COMPARISON:  03/06/2023    FINDINGS:  No focal opacification, pleural effusion, or pneumothorax.    The cardiomediastinal silhouette is within normal limits.    No acute osseous abnormality.                                       Medications   methocarbamoL tablet 1,000 mg (1,000 mg Oral Given 12/31/23 1201)   acetaminophen tablet 1,000 mg (1,000 mg Oral Given 12/31/23 1202)     Medical Decision Making  Differential diagnosis:     68-year-old female with a history of hyperlipidemia, hypertension and chronic lower back pain presents to the ED lower back pain radiating to bilateral legs as well as bilateral shoulder pain.  Patient states that when the pain is exacerbated, she feels short of breath however denies this at baseline.  Denies chest pain, nausea, vomiting, fever, chills, injury, fall or trauma.  Patient states that this has happened in the past and she was given pain medication as well as muscle relaxers however she was out of these medications.  Denies bowel or bladder incontinence, saddle paresthesia, numbness, tingling weakness.      Amount and/or Complexity of Data Reviewed  Labs: ordered.  Radiology: ordered.  ECG/medicine tests: ordered.               ED Course as of 12/31/23 1222   Sun Dec 31, 2023   1147 Per nursing staff, patient was called at 0730 this morning however was not available; told registration that she was going to the cafeteria  [MA]   1216 Hemoglobin(!): 9.9 [MA]   1216 Hematocrit(!): 32.4  Near baseline when compared to patient's previous lab work [MA]   1217 Troponin I: <0.010 [MA]   1217 BNP: 67.5  Chest x-ray clear with no acute cardiopulmonary processes [MA]      ED Course  "User Index  [MA] Jewel Zaidi PA-C          BP (!) 157/78 (BP Location: Left arm, Patient Position: Sitting)   Pulse 70   Temp 98 °F (36.7 °C)   Resp 19   Ht 5' 6" (1.676 m)   Wt 119.7 kg (264 lb)   SpO2 100%   BMI 42.61 kg/m²                    Clinical Impression:  Final diagnoses:  [M54.42, M54.41, G89.29] Chronic bilateral low back pain with bilateral sciatica (Primary)          ED Disposition Condition    Discharge Stable          ED Prescriptions       Medication Sig Dispense Start Date End Date Auth. Provider    HYDROcodone-acetaminophen (NORCO) 5-325 mg per tablet Take 1 tablet by mouth every 8 (eight) hours as needed for Pain. 6 tablet 12/31/2023 1/2/2024 Jewel Zaidi PA-C          Follow-up Information       Follow up With Specialties Details Why Contact Info    Tiffany Harris, P Family Medicine   [facility] [facility] [address]  [phone]      Pain management clinic   Refer to handout sheet regarding pain management clinic to contact and get established with              Jewel Zaidi PA-C  12/31/23 1223    "

## 2024-01-26 ENCOUNTER — PATIENT MESSAGE (OUTPATIENT)
Dept: INTERNAL MEDICINE | Facility: CLINIC | Age: 69
End: 2024-01-26
Payer: MEDICARE

## 2024-01-30 ENCOUNTER — OFFICE VISIT (OUTPATIENT)
Dept: INTERNAL MEDICINE | Facility: CLINIC | Age: 69
End: 2024-01-30
Payer: MEDICARE

## 2024-01-30 VITALS
TEMPERATURE: 98 F | RESPIRATION RATE: 20 BRPM | BODY MASS INDEX: 44.16 KG/M2 | DIASTOLIC BLOOD PRESSURE: 74 MMHG | HEART RATE: 86 BPM | SYSTOLIC BLOOD PRESSURE: 133 MMHG | WEIGHT: 274.81 LBS | HEIGHT: 66 IN | OXYGEN SATURATION: 98 %

## 2024-01-30 DIAGNOSIS — M25.512 CHRONIC PAIN OF BOTH SHOULDERS: ICD-10-CM

## 2024-01-30 DIAGNOSIS — M25.562 CHRONIC PAIN OF BOTH KNEES: Primary | ICD-10-CM

## 2024-01-30 DIAGNOSIS — R26.2 DIFFICULTY IN WALKING: ICD-10-CM

## 2024-01-30 DIAGNOSIS — I10 PRIMARY HYPERTENSION: Chronic | ICD-10-CM

## 2024-01-30 DIAGNOSIS — E78.49 OTHER HYPERLIPIDEMIA: Chronic | ICD-10-CM

## 2024-01-30 DIAGNOSIS — Z99.89 USES ROLLER WALKER: ICD-10-CM

## 2024-01-30 DIAGNOSIS — D64.9 ANEMIA, UNSPECIFIED TYPE: ICD-10-CM

## 2024-01-30 DIAGNOSIS — M25.511 CHRONIC PAIN OF BOTH SHOULDERS: ICD-10-CM

## 2024-01-30 DIAGNOSIS — G89.29 CHRONIC PAIN OF BOTH SHOULDERS: ICD-10-CM

## 2024-01-30 DIAGNOSIS — R54 FRAILTY: Chronic | ICD-10-CM

## 2024-01-30 DIAGNOSIS — G89.29 CHRONIC PAIN OF BOTH KNEES: Primary | ICD-10-CM

## 2024-01-30 DIAGNOSIS — R79.89 ABNORMAL TSH: ICD-10-CM

## 2024-01-30 DIAGNOSIS — M25.561 CHRONIC PAIN OF BOTH KNEES: Primary | ICD-10-CM

## 2024-01-30 PROCEDURE — 3008F BODY MASS INDEX DOCD: CPT | Mod: CPTII,,, | Performed by: NURSE PRACTITIONER

## 2024-01-30 PROCEDURE — 1160F RVW MEDS BY RX/DR IN RCRD: CPT | Mod: CPTII,,, | Performed by: NURSE PRACTITIONER

## 2024-01-30 PROCEDURE — 99215 OFFICE O/P EST HI 40 MIN: CPT | Mod: PBBFAC | Performed by: NURSE PRACTITIONER

## 2024-01-30 PROCEDURE — 3075F SYST BP GE 130 - 139MM HG: CPT | Mod: CPTII,,, | Performed by: NURSE PRACTITIONER

## 2024-01-30 PROCEDURE — 99215 OFFICE O/P EST HI 40 MIN: CPT | Mod: S$PBB,,, | Performed by: NURSE PRACTITIONER

## 2024-01-30 PROCEDURE — 3288F FALL RISK ASSESSMENT DOCD: CPT | Mod: CPTII,,, | Performed by: NURSE PRACTITIONER

## 2024-01-30 PROCEDURE — 1125F AMNT PAIN NOTED PAIN PRSNT: CPT | Mod: CPTII,,, | Performed by: NURSE PRACTITIONER

## 2024-01-30 PROCEDURE — 1159F MED LIST DOCD IN RCRD: CPT | Mod: CPTII,,, | Performed by: NURSE PRACTITIONER

## 2024-01-30 PROCEDURE — 3078F DIAST BP <80 MM HG: CPT | Mod: CPTII,,, | Performed by: NURSE PRACTITIONER

## 2024-01-30 PROCEDURE — 1100F PTFALLS ASSESS-DOCD GE2>/YR: CPT | Mod: CPTII,,, | Performed by: NURSE PRACTITIONER

## 2024-01-30 RX ORDER — PANTOPRAZOLE SODIUM 40 MG/1
40 TABLET, DELAYED RELEASE ORAL DAILY
Qty: 90 TABLET | Refills: 2 | Status: SHIPPED | OUTPATIENT
Start: 2024-01-30

## 2024-01-30 RX ORDER — ERGOCALCIFEROL 1.25 MG/1
50000 CAPSULE ORAL
Qty: 12 CAPSULE | Refills: 2 | Status: SHIPPED | OUTPATIENT
Start: 2024-01-30

## 2024-01-30 RX ORDER — GABAPENTIN 400 MG/1
400 CAPSULE ORAL 3 TIMES DAILY
Qty: 90 CAPSULE | Refills: 3 | Status: SHIPPED | OUTPATIENT
Start: 2024-01-30

## 2024-01-30 NOTE — LETTER
I certify that (Name) Leatha Martinez meets the requirements as outlined in #  (shown on reverse side) and qualifies for a mobility impaired license plate/hang-tag. I further understand that willful and false certification shall subject me to fines/imprisonment as outlined in R.S. 47:463.4 (G) (4). The applicant's information is as follows:    YOB: 1955            Race:Black or         Gender:Female    Address:  [address]  [address]    City:[place]                               State:[place]     Zip Code:[address]     []Permanently Impaired - Applicant has a total or lifelong condition of mobility impairment from which little or no improvement or recovery can reasonably be expected. A medical examiners certification is required on initial application only.      [] Temporarily Impaired - Applicant has a temporary condition of mobility impairment of which improvement or recovery can reasonably be expected. Applicant is entitled to a hangtag, which will be valid for one (1) year. A medical examiners certification is required for the renewal of the hangtag      [] Unable to appear in person at the Office of Motor Vehicles - Applicant must bring facial photo        Medical Examiner's Signature________________________________________ Date:1/30/24_________________________    Printed Name:___________________________________________________    State License #_____________________________    Address: [address] [place] [address] [address].___                                     Phone Number: [phone]                                                                                                                                                                                                                  City: [place]__________________________________ State: [place] __Zip Code: [address] _______________    TO BE COMPLETED BY MOTOR VEHICLE ANALYST ONLY    ERUM   Lic. Plate #      Hangtag Control #    Yahaira ID #      Date Issued:    #:   Office #:

## 2024-01-30 NOTE — PROGRESS NOTES
Patient ID: [id number]     Chief Complaint: Knee Pain (Want referral to Physical therapy for knee. Left knee is worst.) and Medication Refill    HPI:     Leatha Martinez is a 68 y.o. female with diagnosis of HTN, HLD, Hx of DVT, Hx of COVID-19, Obesity, Frequent UTIs. Patient seen in clinic today for follow up. Patient last seen in clinic on 09/26/2023.   Patient missed follow up appointment.   Patient seen in ED for low back pain, SOB. CXR negative. EKG normal. Patient prescribed [place] and discharged home.   Patient had appointment with Orthopedic, Dr. Bhandari, scheduled for 12/21/2023 but was a No Show. Patient states still having bilateral knee pain, left worse than right; and bilateral shoulder pain, left worse than right. Patient currently prescribed Gabapentin 300 mg tid with no relief. Patient unable to take NSAIDs due to currently prescribed Xarelto. Patient requesting rollator, states had 2 falls recently due to knee pain. Patient currently using a borrowed walker for ambulation.   Patient had appointment with Pain Management on 11/16/2023 but was a No Show.   Thyroid US completed on 04/18/2023 due to abnormal TSH level; indicated Multinodular goiter, hypoechoic nodule at the right lobe measures 3 mm (TR 4), hyperechoic nodule at the isthmus measures 1.1 cm (TR 3). Patient seen by ENT (see below), no FNA recommended at this time. TSH decreased, T3 and T4 WNL. Patient denies anxiety, SOB, palpitations, insomnia.   Patient seen by Urology (see below) on 01/04/2023, prescribed Keflex for recurrent UTIs, started with a rash about a week after, patient stopped taking antibiotic and rash improved. Patient allergic to PCN. Patient treated with keflex previously without complications.   Patient states SOB started after diagnosis of COVID. Currently prescribed Albuterol nebulizer, doing well.   Patient denies any other acute complaints.    Patient followed by Cardiology Clinic. Last appointment on 11/28/2023. Leatha  Michelle is a 68 y.o. female with a PMH unprovoked subsegmental PE (2013), severe COVID, HTN, HLD, venous insufficiency, obesity here as a followup. PE reportedly occurred after a flight from [place]. She also states she has a family history of blood clots and lupus. She apparently had testing done for hypercoagulability previously. Endorses ongoing SOB with exertion. Denies any further cardiac complaints. Will have patient complete Echo to rule out any underlying heart failure, structural, or valvular disease that may be contributing to her symptoms. Continue Xarelto 20mg daily for prior PE. Denies any adverse bleeding. Would evaluate causes of microcytic anemia. Continue Amlodipine 10mg daily and Lasix 20mg daily. LDL 86 per labs March 2023. Continue Simvastatin 20mg daily. Counseled on the importance of following a low-sodium, low-cholesterol, low-fat diet and exercise as tolerated. Encouraged weight loss. Refills completed per PCP. EKG today with NSR - unchanged from prior. Patient has follow up appointment scheduled for 12/03/2024.     Patient followed by Urology Clinic. Last appointment on 11/27/2023. Recurrent cystitis: Continue with the nitrofurantoin suppression.  Depending on the results of the culture the antibiotic maybe changed. Urine culture was sent today. We will call and treat if that is positive. I encouraged her to see her primary care especially if the urine culture is negative or this does not improve after treatment of an infection if it is present. Patient has follow up appointment scheduled for 02/29/2024.      Patient followed by Orthopedic Clinic. Last appointment on 11/09/2023. Osteoarthritis of both knees, unspecified osteoarthritis type. Chronic right shoulder pain. Right shoulder pain, unspecified chronicity. Right knee pain, unspecified chronicity. Right rotator cuff tendinitis. Cervical radiculopathy. Lumbar radiculopathy. Plan: Physical exam and imaging findings as well as ER records  discussed with patient. Patient has multiple conditions to address in clinic today. We will start with a Medrol Dosepak and formal physical therapy. We will also set up a appointment with our partner Dr. Adams for evaluation of cervical and lumbar radiculopathy as I believe this is also contributing to her pain. We will hold off on NSAIDs as she is on a blood thinner. I would like to see the patient back in 6 weeks to assess her progress. Patient had follow up appointment scheduled for 2023 but was a NO Show.     Patient followed by ENT Clinic. Last appointment on 10/20/2023. 67 yo F referred for thyroid nodules, none meeting FNA or surveillance criteria. Pt is asymptomatic for hyperthyroid symptoms but, if those develop, would recommend referral to endocrinology. RTC prn.     I spent a total of 40 minutes on the day of the visit.  This includes face to face time and non-face to face time preparing to see the patient (eg, review of tests), obtaining and/or reviewing separately obtained history, documenting clinical information in the electronic or other health record, independently interpreting results and communicating results to the patient/family/caregiver, or care coordinator.    Review of patient's allergies indicates:   Allergen Reactions    Penicillins Hives    Aspirin Rash    Keflex [cephalexin] Rash    Tramadol Rash     Breast Cancer Screening: MMG negative on 2022, reordered  Cervical Cancer Screening: deferred due to age  Colorectal Cancer Screening:  Colonoscopy on 2022, recommend repeat in 3 years  Diabetic Eye Exam: N/A  Diabetic Foot Exam: N/A  Lung Cancer Screening: N/A  Prostate Cancer Screening: N/A  AAA Screening: deferred due to age  Osteoporosis Screenin2020, osteopenia  Medicare Wellness: ordered  Immunizations:   Immunization History   Administered Date(s) Administered    Influenza (FLUAD) - Quadrivalent - Adjuvanted - PF *Preferred* (65+) 10/04/2022, 2023     Tdap 05/22/2017     Past Surgical History:   Procedure Laterality Date    COLONOSCOPY  04/14/2022     family history includes Diabetes in her father; Heart attack in her father and mother; Thyroid disease in her father and mother.    Social History     Socioeconomic History    Marital status: Single    Number of children: 3   Tobacco Use    Smoking status: Never     Passive exposure: Current    Smokeless tobacco: Never   Substance and Sexual Activity    Alcohol use: Never    Drug use: Never    Sexual activity: Not Currently     Partners: Male     Social Determinants of Health     Financial Resource Strain: Medium Risk (4/4/2023)    Overall Financial Resource Strain (CARDIA)     Difficulty of Paying Living Expenses: Somewhat hard   Food Insecurity: No Food Insecurity (4/4/2023)    Hunger Vital Sign     Worried About Running Out of Food in the Last Year: Never true     Ran Out of Food in the Last Year: Never true   Transportation Needs: Unmet Transportation Needs (4/4/2023)    PRAPARE - Transportation     Lack of Transportation (Medical): Yes     Lack of Transportation (Non-Medical): Yes   Physical Activity: Unknown (4/4/2023)    Exercise Vital Sign     Days of Exercise per Week: 3 days   Stress: Stress Concern Present (4/4/2023)    [place] citizen [facility] - Occupational Stress Questionnaire     Feeling of Stress : To some extent   Social Connections: Unknown (4/4/2023)    Social Connection and Isolation Panel [NHANES]     Frequency of Communication with Friends and Family: Three times a week     Frequency of Social Gatherings with Friends and Family: Three times a week     Attends [employer] Services: 1 to 4 times per year     Active Member of Clubs or Organizations: No     Attends Club or Organization Meetings: Never   Housing Stability: Low Risk  (4/4/2023)    Housing Stability Vital Sign     Unable to Pay for Housing in the Last Year: No     Number of Places Lived in the Last Year: 1      "Unstable Housing in the Last Year: No     Current Outpatient Medications   Medication Instructions    albuterol (VENTOLIN HFA) 90 mcg/actuation inhaler 2 puffs, Inhalation, Every 6 hours PRN, Rescue    amLODIPine (NORVASC) 10 mg, Oral, Daily    ergocalciferol (ERGOCALCIFEROL) 50,000 Units, Oral, Every 7 days    furosemide (LASIX) 20 mg, Oral, Daily    gabapentin (NEURONTIN) 400 mg, Oral, 3 times daily    HYDROcodone-acetaminophen (NORCO) 7.5-325 mg per tablet 1 tablet, Oral, Every 8 hours PRN    levoFLOXacin (LEVAQUIN) 500 mg, Oral, Daily    LIDOcaine (LIDODERM) 5 % 1 patch, Transdermal, Daily, Remove & Discard patch within 12 hours or as directed by MD    nitrofurantoin (MACRODANTIN) 50 mg, Oral, Daily    pantoprazole (PROTONIX) 40 mg, Oral, Daily    rivaroxaban (XARELTO) 20 mg, Oral, With dinner    simvastatin (ZOCOR) 20 mg, Oral, Nightly    triamcinolone acetonide 0.1% (KENALOG) 0.1 % cream Topical (Top), 2 times daily       Subjective:     Review of Systems   Constitutional: Negative.    HENT: Negative.     Eyes: Negative.    Respiratory: Negative.     Cardiovascular: Negative.    Gastrointestinal: Negative.    Endocrine: Negative.    Genitourinary: Negative.    Musculoskeletal:  Positive for arthralgias.   Skin: Negative.    Allergic/Immunologic: Negative.    Neurological: Negative.    Hematological: Negative.    Psychiatric/Behavioral: Negative.         Objective:     Visit Vitals  /74 (BP Location: Left arm, Patient Position: Sitting, BP Method: Large (Automatic))   Pulse 86   Temp 98.4 °F (36.9 °C) (Oral)   Resp 20   Ht 5' 5.98" (1.676 m)   Wt 124.6 kg (274 lb 12.8 oz)   SpO2 98%   BMI 44.38 kg/m²       Physical Exam  Vitals reviewed.   Constitutional:       Appearance: Normal appearance. She is obese.   HENT:      Head: Normocephalic and atraumatic.      Mouth/Throat:      Mouth: Mucous membranes are moist.      Pharynx: Oropharynx is clear.   Eyes:      Extraocular Movements: Extraocular movements " intact.      Conjunctiva/sclera: Conjunctivae normal.      Pupils: Pupils are equal, round, and reactive to light.   Cardiovascular:      Rate and Rhythm: Normal rate and regular rhythm.      Heart sounds: Normal heart sounds.   Pulmonary:      Effort: Pulmonary effort is normal.      Breath sounds: Normal breath sounds.   Abdominal:      General: Bowel sounds are normal.   Musculoskeletal:      Right shoulder: No swelling, deformity or tenderness. Decreased range of motion.      Left shoulder: No swelling, deformity or tenderness. Decreased range of motion.      Cervical back: Normal range of motion.      Right knee: No swelling or deformity. Normal range of motion. Tenderness present.      Left knee: No swelling or deformity. Normal range of motion. Tenderness present.   Skin:     General: Skin is warm and dry.   Neurological:      Mental Status: She is alert and oriented to person, place, and time.      Gait: Gait abnormal.      Comments: Ambulates with limp, ambulating with rolator   Psychiatric:         Mood and Affect: Mood normal.         Behavior: Behavior normal.       Labs Reviewed:     Hematology:  Lab Results   Component Value Date    WBC 9.27 12/30/2023    HGB 9.9 (L) 12/30/2023    HCT 32.4 (L) 12/30/2023     12/30/2023     Chemistry:  Lab Results   Component Value Date     12/30/2023    K 4.6 12/30/2023    CHLORIDE 105 12/30/2023    BUN 12.7 12/30/2023    CREATININE 0.69 12/30/2023    EGFRNORACEVR >60 12/30/2023    GLUCOSE 92 12/30/2023    CALCIUM 8.4 12/30/2023    ALKPHOS 132 12/30/2023    LABPROT 7.3 12/30/2023    ALBUMIN 3.3 (L) 12/30/2023    BILIDIR 0.2 03/15/2022    IBILI 0.10 03/15/2022    AST 15 12/30/2023    ALT 14 12/30/2023    MG 1.90 07/29/2023    PHOS 3.5 08/22/2021    RYCRHIKT00SH 12.1 (L) 10/04/2022     Lab Results   Component Value Date    HGBA1C 6.3 10/04/2022     Lipid Panel:  Lab Results   Component Value Date    CHOL 137 03/31/2023    HDL 32 (L) 03/31/2023    LDL 86.00  03/31/2023    TRIG 93 03/31/2023    TOTALCHOLEST 4 03/31/2023     Thyroid:  Lab Results   Component Value Date    TSH 0.085 (L) 09/26/2023    T3FREE 3.28 03/31/2023     Urine:  Lab Results   Component Value Date    COLORUA Light-Yellow 03/31/2023    APPEARANCEUA Clear 03/31/2023    SGUA 1.012 03/31/2023    PHUA 5.5 03/31/2023    PROTEINUA Negative 03/31/2023    GLUCOSEUA Normal 03/31/2023    KETONESUA Negative 03/31/2023    BLOODUA Negative 03/31/2023    NITRITESUA Negative 03/31/2023    LEUKOCYTESUR 250 (A) 03/31/2023    RBCUA 0-5 03/31/2023    WBCUA 11-20 (A) 03/31/2023    BACTERIA Occ (A) 03/31/2023    SQEPUA Few (A) 03/31/2023    HYALINECASTS 0-2 (A) 03/31/2023    CREATRANDUR 87.5 03/31/2023        Assessment:       ICD-10-CM ICD-9-CM   1. Chronic pain of both knees  M25.561 719.46    M25.562 338.29    G89.29    2. Chronic pain of both shoulders  M25.511 719.41    G89.29 338.29    M25.512    3. Abnormal TSH  R79.89 790.6   4. Primary hypertension  I10 401.9   5. Other hyperlipidemia  E78.49 272.4   6. Frailty  R54 797   7. Difficulty in walking  R26.2 719.7   8. Uses roller walker  Z99.89 V46.8   9. Anemia, unspecified type  D64.9 285.9       Plan:     1. Chronic pain of both knees  Increase Gabapentin to 400 mg tid  - Ambulatory referral/consult to Orthopedics; Future  - Ambulatory referral/consult to Physical/Occupational Therapy; Future  - Ambulatory referral/consult to Pain Clinic; Future    2. Chronic pain of both shoulders  Increase Gabapentin to 400 mg tid  - Ambulatory referral/consult to Physical/Occupational Therapy; Future  - Ambulatory referral/consult to Pain Clinic; Future    3. Abnormal TSH  TSH level: 0.085  - Ambulatory referral/consult to Endocrinology; Future    4. Primary hypertension  Vitals:    01/30/24 1329   BP: 133/74   Pulse: 86   Resp: 20   Temp: 98.4 °F (36.9 °C)      Results for orders placed or performed in visit on 03/31/23   Microalbumin/Creatinine Ratio, Urine   Result Value Ref  Range    Urine Microalbumin 38.5 (H) <=30.0 ug/ml    Urine Creatinine 87.5 47.0 - 110.0 mg/dL    Microalbumin Creatinine Ratio 44.0 (H) 0.0 - 30.0 mg/gm Cr     Results for orders placed or performed during the hospital encounter of 12/30/23   EKG 12-lead    Collection Time: 12/30/23 10:30 PM    Narrative    Test Reason : R07.9,    Vent. Rate : 068 BPM     Atrial Rate : 068 BPM     P-R Int : 136 ms          QRS Dur : 076 ms      QT Int : 388 ms       P-R-T Axes : 031 006 009 degrees     QTc Int : 412 ms    Normal sinus rhythm  Normal ECG  When compared with ECG of 30-DEC-2023 22:29,  No significant change was found  Confirmed by Ottoniel Gross MD ([id number]) on 12/31/2023 11:21:10 AM    Referred By:             Confirmed By:Ottoniel Gross MD   Continue Amlodipine 10 mg daily, Lasix 20 mg daily  DASH diet  Encouraged home blood pressure monitoring    5. Other hyperlipidemia  Continue Simvastatin 20 mg daily  Weight loss encouraged  Low fat/high fiber diet  Increase physical activity  Cholesterol Total   Date Value Ref Range Status   03/31/2023 137 <=200 mg/dL Final     HDL Cholesterol   Date Value Ref Range Status   03/31/2023 32 (L) 35 - 60 mg/dL Final     Triglyceride   Date Value Ref Range Status   03/31/2023 93 37 - 140 mg/dL Final     LDL Cholesterol   Date Value Ref Range Status   03/31/2023 86.00 50.00 - 140.00 mg/dL Final     6. Frailty  Recent falls, difficulty ambulating due to bilateral knee pain, walker ordered    7. Difficulty in walking  Due to bilateral knee pain  Walker ordered    8. Uses roller walker  Walker ordered previously, patient did not receive walker. Currently using a borrowed walker from a friend. Prescription for walker printed and given to patient.     9. Anemia, unspecified type  RBC   Date Value Ref Range Status   12/30/2023 4.31 4.20 - 5.40 x10(6)/mcL Final     Hgb   Date Value Ref Range Status   12/30/2023 9.9 (L) 12.0 - 16.0 g/dL Final     Hct   Date Value Ref Range Status   12/30/2023  32.4 (L) 37.0 - 47.0 % Final     MCV   Date Value Ref Range Status   12/30/2023 75.2 (L) 80.0 - 94.0 fL Final     Ferritin Level   Date Value Ref Range Status   08/16/2021 153.07 4.63 - 204.00 ng/mL Final   Repeat labs ordered      Follow up in about 6 weeks (around 3/12/2024) for Labs, anemia. In addition to their scheduled follow up, the patient has also been instructed to follow up on as needed basis.     Tiffany Harris, KUSHALP

## 2024-02-03 ENCOUNTER — HOSPITAL ENCOUNTER (EMERGENCY)
Facility: HOSPITAL | Age: 69
Discharge: HOME | End: 2024-02-03
Attending: FAMILY MEDICINE
Payer: MEDICARE

## 2024-02-03 VITALS
HEIGHT: 66 IN | TEMPERATURE: 98 F | RESPIRATION RATE: 18 BRPM | SYSTOLIC BLOOD PRESSURE: 176 MMHG | WEIGHT: 274.38 LBS | DIASTOLIC BLOOD PRESSURE: 73 MMHG | OXYGEN SATURATION: 100 % | BODY MASS INDEX: 44.1 KG/M2 | HEART RATE: 79 BPM

## 2024-02-03 DIAGNOSIS — M54.2 CHRONIC NECK PAIN: Primary | ICD-10-CM

## 2024-02-03 DIAGNOSIS — G89.29 CHRONIC BILATERAL LOW BACK PAIN, UNSPECIFIED WHETHER SCIATICA PRESENT: ICD-10-CM

## 2024-02-03 DIAGNOSIS — M25.552 CHRONIC LEFT HIP PAIN: ICD-10-CM

## 2024-02-03 DIAGNOSIS — G89.29 CHRONIC LEFT HIP PAIN: ICD-10-CM

## 2024-02-03 DIAGNOSIS — G89.29 CHRONIC NECK PAIN: Primary | ICD-10-CM

## 2024-02-03 DIAGNOSIS — M54.50 CHRONIC BILATERAL LOW BACK PAIN, UNSPECIFIED WHETHER SCIATICA PRESENT: ICD-10-CM

## 2024-02-03 PROCEDURE — 99284 EMERGENCY DEPT VISIT MOD MDM: CPT

## 2024-02-03 PROCEDURE — 25000003 PHARM REV CODE 250: Performed by: PHYSICIAN ASSISTANT

## 2024-02-03 RX ORDER — HYDROCODONE BITARTRATE AND ACETAMINOPHEN 7.5; 325 MG/1; MG/1
1 TABLET ORAL ONCE
Status: COMPLETED | OUTPATIENT
Start: 2024-02-03 | End: 2024-02-03

## 2024-02-03 RX ORDER — HYDROCODONE BITARTRATE AND ACETAMINOPHEN 7.5; 325 MG/1; MG/1
1 TABLET ORAL EVERY 6 HOURS PRN
Qty: 10 TABLET | Refills: 0 | Status: SHIPPED | OUTPATIENT
Start: 2024-02-03 | End: 2024-02-08

## 2024-02-03 RX ORDER — METHOCARBAMOL 750 MG/1
1500 TABLET, FILM COATED ORAL 3 TIMES DAILY PRN
Qty: 30 TABLET | Refills: 0 | Status: SHIPPED | OUTPATIENT
Start: 2024-02-03 | End: 2024-02-13

## 2024-02-03 RX ADMIN — HYDROCODONE BITARTRATE AND ACETAMINOPHEN 1 TABLET: 7.5; 325 TABLET ORAL at 08:02

## 2024-02-04 NOTE — ED PROVIDER NOTES
Encounter Date: 2/3/2024       History     Chief Complaint   Patient presents with    Back Pain    Neck Pain     States chronic neck, back and left hip pain.  States missed Ortho appointment.  Walks with personal rolling walker.       68-year-old female with past medical history significant for chronic pain, arthritis, morbid obesity, hypertension, hyperlipidemia and VTE presents to ED complaining of chronic pain to neck lower back and left hip.  Patient reports she was referred to Orthopedics Clinic, but missed her appointment.  Denies any recent change in the nature or severity of her pain.  Denies any recent falls.  Denies fever, chills, chest pain, shortness of breath, nausea, vomiting, diarrhea, cough, congestion.  Vital signs stable on arrival, patient in no acute distress.      Review of patient's allergies indicates:   Allergen Reactions    Penicillins Hives    Aspirin Rash    Keflex [cephalexin] Rash    Tramadol Rash     Past Medical History:   Diagnosis Date    Hyperlipidemia     Hypertension     Other pulmonary embolism without acute cor pulmonale     Personal history of colonic polyps 04/14/2022     Past Surgical History:   Procedure Laterality Date    COLONOSCOPY  04/14/2022     Family History   Problem Relation Age of Onset    Heart attack Mother     Thyroid disease Mother     Diabetes Father     Thyroid disease Father     Heart attack Father      Social History     Tobacco Use    Smoking status: Never     Passive exposure: Current    Smokeless tobacco: Never   Substance Use Topics    Alcohol use: Never    Drug use: Never     Review of Systems   All other systems reviewed and are negative.      Physical Exam     Initial Vitals [02/03/24 1919]   BP Pulse Resp Temp SpO2   (!) 176/73 79 18 98.1 °F (36.7 °C) 100 %      MAP       --         Physical Exam    Nursing note and vitals reviewed.  Constitutional: She appears well-developed and well-nourished. She is not diaphoretic. No distress.   HENT:   Head:  Normocephalic and atraumatic.   Nose: Nose normal.   Mouth/Throat: Oropharynx is clear and moist. No oropharyngeal exudate.   Eyes: Conjunctivae and EOM are normal. Pupils are equal, round, and reactive to light.   Neck: Neck supple.   Normal range of motion.  Cardiovascular:  Normal rate, regular rhythm, normal heart sounds and intact distal pulses.     Exam reveals no gallop and no friction rub.       No murmur heard.  Pulmonary/Chest: Breath sounds normal. No respiratory distress. She has no wheezes. She has no rhonchi. She has no rales.   Abdominal: Abdomen is soft. Bowel sounds are normal. She exhibits no distension. There is no abdominal tenderness. There is no rebound and no guarding.   Musculoskeletal:         General: Tenderness (generalized muscle tenderness to palpation. No swelling, deformity or other signs of injury.) present. No edema. Normal range of motion.      Cervical back: Normal range of motion and neck supple.     Neurological: She is alert and oriented to person, place, and time. She has normal strength and normal reflexes. No sensory deficit. GCS score is 15. GCS eye subscore is 4. GCS verbal subscore is 5. GCS motor subscore is 6.   Skin: Skin is warm and dry. Capillary refill takes less than 2 seconds. No rash noted. No erythema. No pallor.   Psychiatric: She has a normal mood and affect. Thought content normal.         ED Course   Procedures  Labs Reviewed - No data to display       Imaging Results    None          Medications   HYDROcodone-acetaminophen 7.5-325 mg per tablet 1 tablet (1 tablet Oral Given 2/3/24 2004)     Medical Decision Making  Differential diagnosis: Includes but not limited to arthritis, chronic pain, viral illness     ED management:  Patient given dose of Norco prior to discharge.      ED course:  Patient is nontoxic appearing with unremarkable vitals.  There is no evidence of injury on physical exam and patient denies any recent falls.  Patient also denies any change  in the nature or severity of her symptoms recently.  Patient is stable for discharge.  I will send patient home with short course medication for symptomatic relief of pain, but explained to her the limitations of treating chronic pain in the ED.  I informed patient she needed to contact her PCP on Monday and also needed to reschedule her Orthopedics appointment.  Strict ED precautions given for new or worsening symptoms and patient verbalized understanding.  All questions answered.                                      Clinical Impression:  Final diagnoses:  [M54.2, G89.29] Chronic neck pain (Primary)  [M54.50, G89.29] Chronic bilateral low back pain, unspecified whether sciatica present  [M25.552, G89.29] Chronic left hip pain          ED Disposition Condition    Discharge Good          ED Prescriptions       Medication Sig Dispense Start Date End Date Auth. Provider    HYDROcodone-acetaminophen (NORCO) 7.5-325 mg per tablet Take 1 tablet by mouth every 6 (six) hours as needed for Pain. 10 tablet 2/3/2024 2/8/2024 Karlo Garcia PA    methocarbamoL (ROBAXIN-750) 750 MG Tab Take 2 tablets (1,500 mg total) by mouth 3 (three) times daily as needed (pain). 30 tablet 2/3/2024 2/13/2024 Karlo Garcia PA          Follow-up Information       Follow up With Specialties Details Why Contact Info    Tiffany Harris, [facility] Family [facility] Call on 2/5/2024  [address] [facility] [address]  [phone]      [facility] - Emergency Dept Emergency Medicine  As needed, If symptoms worsen [address] [facility] [id number] [phone]             Karlo Garcia PA  02/03/24 2007

## 2024-02-04 NOTE — DISCHARGE INSTRUCTIONS
Report to Emergency Department if symptoms return or worsen; [facility] - Medicine Clinic Within 1 to 2 days, It is important that you follow up with your primary care provider or specialist if indicated for further evaluation, workup, and treatment as necessary. The exam and treatment you received in Emergency Department was for an urgent problem and NOT INTENDED AS COMPLETE CARE. It is important that you FOLLOW UP with a doctor for ongoing care. If your symptoms become WORSE or you DO NOT IMPROVE and you are unable to reach your health care provider, you should RETURN to the Emergency Department. The Emergency Department provider has provided a PRELIMINARY INTERPRETATION of all your studies. A final interpretation may be done after you are discharged. If a change in your diagnosis or treatment is needed WE WILL CONTACT YOU. It is critical that we have a CURRENT PHONE NUMBER FOR YOU.

## 2024-02-08 DIAGNOSIS — R79.89 ABNORMAL TSH: Primary | ICD-10-CM

## 2024-03-12 ENCOUNTER — OFFICE VISIT (OUTPATIENT)
Dept: INTERNAL MEDICINE | Facility: CLINIC | Age: 69
End: 2024-03-12
Payer: MEDICARE

## 2024-03-12 ENCOUNTER — HOSPITAL ENCOUNTER (OUTPATIENT)
Dept: RADIOLOGY | Facility: HOSPITAL | Age: 69
Discharge: HOME | End: 2024-03-12
Attending: NURSE PRACTITIONER
Payer: MEDICARE

## 2024-03-12 ENCOUNTER — PATIENT MESSAGE (OUTPATIENT)
Dept: INTERNAL MEDICINE | Facility: CLINIC | Age: 69
End: 2024-03-12

## 2024-03-12 ENCOUNTER — TELEPHONE (OUTPATIENT)
Dept: INTERNAL MEDICINE | Facility: CLINIC | Age: 69
End: 2024-03-12

## 2024-03-12 VITALS
DIASTOLIC BLOOD PRESSURE: 71 MMHG | HEART RATE: 75 BPM | RESPIRATION RATE: 18 BRPM | SYSTOLIC BLOOD PRESSURE: 134 MMHG | TEMPERATURE: 99 F | BODY MASS INDEX: 43.01 KG/M2 | WEIGHT: 267.63 LBS | HEIGHT: 66 IN

## 2024-03-12 DIAGNOSIS — R06.02 SOB (SHORTNESS OF BREATH): Primary | ICD-10-CM

## 2024-03-12 DIAGNOSIS — E66.01 CLASS 3 SEVERE OBESITY DUE TO EXCESS CALORIES WITH SERIOUS COMORBIDITY AND BODY MASS INDEX (BMI) OF 40.0 TO 44.9 IN ADULT: Chronic | ICD-10-CM

## 2024-03-12 DIAGNOSIS — D64.9 ANEMIA, UNSPECIFIED TYPE: ICD-10-CM

## 2024-03-12 DIAGNOSIS — I10 PRIMARY HYPERTENSION: Chronic | ICD-10-CM

## 2024-03-12 DIAGNOSIS — E78.49 OTHER HYPERLIPIDEMIA: Chronic | ICD-10-CM

## 2024-03-12 DIAGNOSIS — R06.02 SOB (SHORTNESS OF BREATH): ICD-10-CM

## 2024-03-12 DIAGNOSIS — Z12.31 ENCOUNTER FOR SCREENING MAMMOGRAM FOR MALIGNANT NEOPLASM OF BREAST: ICD-10-CM

## 2024-03-12 PROCEDURE — 3075F SYST BP GE 130 - 139MM HG: CPT | Mod: CPTII,,, | Performed by: NURSE PRACTITIONER

## 2024-03-12 PROCEDURE — 99215 OFFICE O/P EST HI 40 MIN: CPT | Mod: S$PBB,,, | Performed by: NURSE PRACTITIONER

## 2024-03-12 PROCEDURE — 1159F MED LIST DOCD IN RCRD: CPT | Mod: CPTII,,, | Performed by: NURSE PRACTITIONER

## 2024-03-12 PROCEDURE — 1160F RVW MEDS BY RX/DR IN RCRD: CPT | Mod: CPTII,,, | Performed by: NURSE PRACTITIONER

## 2024-03-12 PROCEDURE — 3008F BODY MASS INDEX DOCD: CPT | Mod: CPTII,,, | Performed by: NURSE PRACTITIONER

## 2024-03-12 PROCEDURE — 71046 X-RAY EXAM CHEST 2 VIEWS: CPT | Mod: TC

## 2024-03-12 PROCEDURE — 99215 OFFICE O/P EST HI 40 MIN: CPT | Mod: PBBFAC,25 | Performed by: NURSE PRACTITIONER

## 2024-03-12 PROCEDURE — 1100F PTFALLS ASSESS-DOCD GE2>/YR: CPT | Mod: CPTII,,, | Performed by: NURSE PRACTITIONER

## 2024-03-12 PROCEDURE — 3288F FALL RISK ASSESSMENT DOCD: CPT | Mod: CPTII,,, | Performed by: NURSE PRACTITIONER

## 2024-03-12 PROCEDURE — 3078F DIAST BP <80 MM HG: CPT | Mod: CPTII,,, | Performed by: NURSE PRACTITIONER

## 2024-03-12 PROCEDURE — 1125F AMNT PAIN NOTED PAIN PRSNT: CPT | Mod: CPTII,,, | Performed by: NURSE PRACTITIONER

## 2024-03-12 RX ORDER — ALBUTEROL SULFATE 90 UG/1
2 AEROSOL, METERED RESPIRATORY (INHALATION) EVERY 6 HOURS PRN
Qty: 18 G | Refills: 1 | Status: SHIPPED | OUTPATIENT
Start: 2024-03-12 | End: 2024-06-10

## 2024-03-12 RX ORDER — FERROUS GLUCONATE 324(38)MG
324 TABLET ORAL
Qty: 90 TABLET | Refills: 1 | Status: SHIPPED | OUTPATIENT
Start: 2024-03-12

## 2024-03-12 NOTE — TELEPHONE ENCOUNTER
----- Message from RENU Tubbs sent at 3/12/2024 10:41 AM CDT -----  Please follow up with referral sent to Dr. Lang Hodge.

## 2024-03-12 NOTE — PROGRESS NOTES
Patient ID: [id number]     Chief Complaint: Follow-up, Results, and Shortness of Breath (SOB  walking. )    HPI:     Leatha Martinez is a 68 y.o. female with diagnosis of HTN, HLD, Hx of DVT, Hx of COVID-19, Obesity, Frequent UTIs. Patient seen in clinic today for follow up. Patient last seen in clinic on 01/30/2024.   At previous appt, patient referred to Orthopedic, PT, and Pain Management for bilateral knee pain and shoulder pain. Patient Gabapentin increased to 400 mg tid. Patient had appointment with Ortho scheduled for 02/08/2024 but canceled appt. Patient states she has to call to reschedule. Patient has appt scheduled with Pain Management on 03/14/2024.   Patient also referred to Endocrinology for abnormal TSH. Thyroid US completed on 04/18/2023 due to abnormal TSH level; indicated Multinodular goiter, hypoechoic nodule at the right lobe measures 3 mm (TR 4), hyperechoic nodule at the isthmus measures 1.1 cm (TR 3). Patient seen by ENT (see below), no FNA recommended at this time. TSH decreased, T3 and T4 WNL. Patient denies anxiety, SOB, palpitations, insomnia. No appt noted, will follow up with referral.   Anemia noted on labs. Patient denies hematuria, hematochezia. Patient denies weakness, chest pain.   Today, patient states SOB, needs refill on albuterol inhaler. Patient states SOB started after being diagnosed with COVID years ago. States using albuterol inhaler prn.   Patient seen by Urology (see below) on 01/04/2023, prescribed Keflex for recurrent UTIs, started with a rash about a week after, patient stopped taking antibiotic and rash improved. Patient allergic to PCN. Patient treated with keflex previously without complications.   Patient states SOB started after diagnosis of COVID. Currently prescribed Albuterol nebulizer, doing well.   Patient denies any other acute complaints.    Patient followed by Cardiology Clinic. Last appointment on 11/28/2023. Leatha Martinez is a 68 y.o. female with a PMH  unprovoked subsegmental PE (2013), severe COVID, HTN, HLD, venous insufficiency, obesity here as a followup. PE reportedly occurred after a flight from [place]. She also states she has a family history of blood clots and lupus. She apparently had testing done for hypercoagulability previously. Endorses ongoing SOB with exertion. Denies any further cardiac complaints. Will have patient complete Echo to rule out any underlying heart failure, structural, or valvular disease that may be contributing to her symptoms. Continue Xarelto 20mg daily for prior PE. Denies any adverse bleeding. Would evaluate causes of microcytic anemia. Continue Amlodipine 10mg daily and Lasix 20mg daily. LDL 86 per labs March 2023. Continue Simvastatin 20mg daily. Counseled on the importance of following a low-sodium, low-cholesterol, low-fat diet and exercise as tolerated. Encouraged weight loss. Refills completed per PCP. EKG today with NSR - unchanged from prior. Patient has follow up appointment scheduled for 12/03/2024.     Patient followed by Urology Clinic. Last appointment on 11/27/2023. Recurrent cystitis: Continue with the nitrofurantoin suppression.  Depending on the results of the culture the antibiotic maybe changed. Urine culture was sent today. We will call and treat if that is positive. I encouraged her to see her primary care especially if the urine culture is negative or this does not improve after treatment of an infection if it is present. Patient has follow up appointment scheduled for 02/29/2024.      Patient followed by Orthopedic Clinic. Last appointment on 11/09/2023. Osteoarthritis of both knees, unspecified osteoarthritis type. Chronic right shoulder pain. Right shoulder pain, unspecified chronicity. Right knee pain, unspecified chronicity. Right rotator cuff tendinitis. Cervical radiculopathy. Lumbar radiculopathy. Plan: Physical exam and imaging findings as well as ER records discussed with patient. Patient has  multiple conditions to address in clinic today. We will start with a Medrol Dosepak and formal physical therapy. We will also set up a appointment with our partner Dr. Adams for evaluation of cervical and lumbar radiculopathy as I believe this is also contributing to her pain. We will hold off on NSAIDs as she is on a blood thinner. I would like to see the patient back in 6 weeks to assess her progress. Patient had follow up appointment scheduled for 2023 but was a NO Show.     Patient followed by ENT Clinic. Last appointment on 10/20/2023. 67 yo F referred for thyroid nodules, none meeting FNA or surveillance criteria. Pt is asymptomatic for hyperthyroid symptoms but, if those develop, would recommend referral to endocrinology. RTC prn.     I spent a total of 40 minutes on the day of the visit.  This includes face to face time and non-face to face time preparing to see the patient (eg, review of tests), obtaining and/or reviewing separately obtained history, documenting clinical information in the electronic or other health record, independently interpreting results and communicating results to the patient/family/caregiver, or care coordinator.      Review of patient's allergies indicates:   Allergen Reactions    Penicillins Hives    Aspirin Rash    Keflex [cephalexin] Rash    Tramadol Rash     Breast Cancer Screening: MMG negative on 2023  Cervical Cancer Screening: deferred due to age  Colorectal Cancer Screening:  Colonoscopy on 2022, recommend repeat in 3 years  Diabetic Eye Exam: N/A  Diabetic Foot Exam: N/A  Lung Cancer Screening: N/A  Prostate Cancer Screening: N/A  AAA Screening: deferred due to age  Osteoporosis Screenin2020, osteopenia  Medicare Wellness: ordered  Immunizations:   Immunization History   Administered Date(s) Administered    Influenza (FLUAD) - Quadrivalent - Adjuvanted - PF *Preferred* (65+) 10/04/2022, 2023    Tdap 2017     Past Surgical History:    Procedure Laterality Date    COLONOSCOPY  04/14/2022     family history includes Diabetes in her father; Heart attack in her father and mother; Thyroid disease in her father and mother.    Social History     Socioeconomic History    Marital status: Single    Number of children: 3   Tobacco Use    Smoking status: Never     Passive exposure: Current    Smokeless tobacco: Never   Substance and Sexual Activity    Alcohol use: Never    Drug use: Never    Sexual activity: Not Currently     Partners: Male     Social Determinants of Health     Financial Resource Strain: Medium Risk (4/4/2023)    Overall Financial Resource Strain (CARDIA)     Difficulty of Paying Living Expenses: Somewhat hard   Food Insecurity: No Food Insecurity (4/4/2023)    Hunger Vital Sign     Worried About Running Out of Food in the Last Year: Never true     Ran Out of Food in the Last Year: Never true   Transportation Needs: Unmet Transportation Needs (4/4/2023)    PRAPARE - Transportation     Lack of Transportation (Medical): Yes     Lack of Transportation (Non-Medical): Yes   Physical Activity: Unknown (4/4/2023)    Exercise Vital Sign     Days of Exercise per Week: 3 days   Stress: Stress Concern Present (4/4/2023)    [employer] [place] - Occupational Stress Questionnaire     Feeling of Stress : To some extent   Social Connections: Unknown (4/4/2023)    Social Connection and Isolation Panel [NHANES]     Frequency of Communication with Friends and Family: Three times a week     Frequency of Social Gatherings with Friends and Family: Three times a week     Attends Je[employer] witness Services: 1 to 4 times per year     Active Member of Clubs or Organizations: No     Attends Club or Organization Meetings: Never   Housing Stability: Low Risk  (4/4/2023)    Housing Stability Vital Sign     Unable to Pay for Housing in the Last Year: No     Number of Places Lived in the Last Year: 1     Unstable Housing in the Last Year: No     Current  "Outpatient Medications   Medication Instructions    albuterol (VENTOLIN HFA) 90 mcg/actuation inhaler 2 puffs, Inhalation, Every 6 hours PRN, Rescue    amLODIPine (NORVASC) 10 mg, Oral, Daily    ergocalciferol (ERGOCALCIFEROL) 50,000 Units, Oral, Every 7 days    ferrous gluconate (FERGON) 324 mg, Oral, With breakfast    furosemide (LASIX) 20 mg, Oral, Daily    gabapentin (NEURONTIN) 400 mg, Oral, 3 times daily    levoFLOXacin (LEVAQUIN) 500 mg, Oral, Daily    LIDOcaine (LIDODERM) 5 % 1 patch, Transdermal, Daily, Remove & Discard patch within 12 hours or as directed by MD    nitrofurantoin (MACRODANTIN) 50 mg, Oral, Daily    pantoprazole (PROTONIX) 40 mg, Oral, Daily    rivaroxaban (XARELTO) 20 mg, Oral, With dinner    simvastatin (ZOCOR) 20 mg, Oral, Nightly    triamcinolone acetonide 0.1% (KENALOG) 0.1 % cream Topical (Top), 2 times daily       Subjective:     Review of Systems   Constitutional: Negative.    HENT: Negative.     Eyes: Negative.    Respiratory:  Positive for shortness of breath.    Cardiovascular: Negative.    Gastrointestinal: Negative.    Endocrine: Negative.    Genitourinary: Negative.    Musculoskeletal:  Positive for arthralgias.   Skin: Negative.    Allergic/Immunologic: Negative.    Neurological: Negative.    Hematological: Negative.    Psychiatric/Behavioral: Negative.         Objective:     Visit Vitals  /71 (BP Location: Right arm, Patient Position: Sitting, BP Method: Large (Automatic))   Pulse 75   Temp 98.6 °F (37 °C) (Oral)   Resp 18   Ht 5' 5.75" (1.67 m)   Wt 121.4 kg (267 lb 9.6 oz)   BMI 43.52 kg/m²       Physical Exam  Vitals reviewed.   Constitutional:       Appearance: Normal appearance. She is obese.   HENT:      Head: Normocephalic and atraumatic.      Mouth/Throat:      Mouth: Mucous membranes are moist.      Pharynx: Oropharynx is clear.   Eyes:      Extraocular Movements: Extraocular movements intact.      Conjunctiva/sclera: Conjunctivae normal.      Pupils: Pupils " are equal, round, and reactive to light.   Cardiovascular:      Rate and Rhythm: Normal rate and regular rhythm.      Heart sounds: Normal heart sounds.   Pulmonary:      Effort: Pulmonary effort is normal.      Breath sounds: Normal breath sounds.   Abdominal:      General: Bowel sounds are normal.   Musculoskeletal:         General: Normal range of motion.      Cervical back: Normal range of motion.   Skin:     General: Skin is warm and dry.   Neurological:      Mental Status: She is alert and oriented to person, place, and time.      Gait: Gait abnormal.      Comments: Ambulates with rolling walker   Psychiatric:         Mood and Affect: Mood normal.         Behavior: Behavior normal.       Labs Reviewed:     Hematology:  Lab Results   Component Value Date    WBC 7.17 03/12/2024    HGB 8.7 (L) 03/12/2024    HCT 28.4 (L) 03/12/2024     03/12/2024     Chemistry:  Lab Results   Component Value Date     03/12/2024    K 4.3 03/12/2024    CHLORIDE 107 03/12/2024    BUN 20.9 (H) 03/12/2024    CREATININE 0.81 03/12/2024    EGFRNORACEVR >60 03/12/2024    GLUCOSE 102 03/12/2024    CALCIUM 9.2 03/12/2024    ALKPHOS 142 03/12/2024    LABPROT 8.0 (H) 03/12/2024    ALBUMIN 3.4 03/12/2024    BILIDIR 0.2 03/15/2022    IBILI 0.10 03/15/2022    AST 16 03/12/2024    ALT 11 03/12/2024    MG 1.90 07/29/2023    PHOS 3.5 08/22/2021    OMBQAXDQ34KD 12.1 (L) 10/04/2022     Lab Results   Component Value Date    HGBA1C 6.3 10/04/2022     Lipid Panel:  Lab Results   Component Value Date    CHOL 154 03/12/2024    HDL 34 (L) 03/12/2024    .00 03/12/2024    TRIG 84 03/12/2024    TOTALCHOLEST 5 03/12/2024     Thyroid:  Lab Results   Component Value Date    TSH 0.246 (L) 03/12/2024    T3FREE 3.28 03/31/2023     Urine:  Lab Results   Component Value Date    COLORUA Light-Yellow 03/31/2023    APPEARANCEUA Clear 03/31/2023    SGUA 1.012 03/31/2023    PHUA 5.5 03/31/2023    PROTEINUA Negative 03/31/2023    GLUCOSEUA Normal  03/31/2023    KETONESUA Negative 03/31/2023    BLOODUA Negative 03/31/2023    NITRITESUA Negative 03/31/2023    LEUKOCYTESUR 250 (A) 03/31/2023    RBCUA 0-5 03/31/2023    WBCUA 11-20 (A) 03/31/2023    BACTERIA Occ (A) 03/31/2023    SQEPUA Few (A) 03/31/2023    HYALINECASTS 0-2 (A) 03/31/2023    CREATRANDUR 87.5 03/31/2023        Assessment:       ICD-10-CM ICD-9-CM   1. SOB (shortness of breath)  R06.02 786.05   2. Anemia, unspecified type  D64.9 285.9   3. Primary hypertension  I10 401.9   4. Other hyperlipidemia  E78.49 272.4   5. Class 3 severe obesity due to excess calories with serious comorbidity and body mass index (BMI) of 40.0 to 44.9 in adult  E66.01 278.01    Z68.41 V85.41   6. Encounter for screening mammogram for malignant neoplasm of breast  Z12.31 V76.12       Plan:     1. SOB (shortness of breath)  Patient states she needs refill on Albuterol inhaler  Denies chest pain.   - X-Ray Chest PA And Lateral; Future    2. Anemia, unspecified type  RBC   Date Value Ref Range Status   03/12/2024 3.95 (L) 4.20 - 5.40 x10(6)/mcL Final     Hgb   Date Value Ref Range Status   03/12/2024 8.7 (L) 12.0 - 16.0 g/dL Final     Hct   Date Value Ref Range Status   03/12/2024 28.4 (L) 37.0 - 47.0 % Final     MCV   Date Value Ref Range Status   03/12/2024 71.9 (L) 80.0 - 94.0 fL Final     Iron Level   Date Value Ref Range Status   03/12/2024 27 (L) 50 - 170 ug/dL Final     Iron Binding Capacity Total   Date Value Ref Range Status   03/12/2024 366 250 - 450 ug/dL Final     Ferritin Level   Date Value Ref Range Status   03/12/2024 9.52 4.63 - 204.00 ng/mL Final   Start ferrous gluconate daily  Occult stool ordered  Repeat labs ordered  - OCCULT BLOOD FECAL IMMUNOASSAY; Future  - OCCULT BLOOD FECAL IMMUNOASSAY    3. Primary hypertension  Vitals:    03/12/24 0837   BP: 134/71   Pulse: 75   Resp: 18   Temp: 98.6 °F (37 °C)      Results for orders placed or performed in visit on 03/31/23   Microalbumin/Creatinine Ratio, Urine    Result Value Ref Range    Urine Microalbumin 38.5 (H) <=30.0 ug/ml    Urine Creatinine 87.5 47.0 - 110.0 mg/dL    Microalbumin Creatinine Ratio 44.0 (H) 0.0 - 30.0 mg/gm Cr     Results for orders placed or performed during the hospital encounter of 12/30/23   EKG 12-lead    Collection Time: 12/30/23 10:30 PM    Narrative    Test Reason : R07.9,    Vent. Rate : 068 BPM     Atrial Rate : 068 BPM     P-R Int : 136 ms          QRS Dur : 076 ms      QT Int : 388 ms       P-R-T Axes : 031 006 009 degrees     QTc Int : 412 ms    Normal sinus rhythm  Normal ECG  When compared with ECG of 30-DEC-2023 22:29,  No significant change was found  Confirmed by Ottoniel Gross MD ([id number]) on 12/31/2023 11:21:10 AM    Referred By:             Confirmed By:Ottoniel Gross MD   Continue Amlodipine 10 mg daily, Lasix 20 mg daily  DASH diet  Encouraged home blood pressure monitoring    4. Other hyperlipidemia  Continue Simvastatin 20 mg daily  Weight loss encouraged  Low fat/high fiber diet  Increase physical activity  Tobacco cessation encouraged  Cholesterol Total   Date Value Ref Range Status   03/12/2024 154 <=200 mg/dL Final     HDL Cholesterol   Date Value Ref Range Status   03/12/2024 34 (L) 35 - 60 mg/dL Final     Triglyceride   Date Value Ref Range Status   03/12/2024 84 37 - 140 mg/dL Final     LDL Cholesterol   Date Value Ref Range Status   03/12/2024 103.00 50.00 - 140.00 mg/dL Final     5. Class 3 severe obesity due to excess calories with serious comorbidity and body mass index (BMI) of 40.0 to 44.9 in adult  Body mass index is 43.52 kg/m².  TSH   Date Value Ref Range Status   03/12/2024 0.246 (L) 0.350 - 4.940 uIU/mL Final     Vit D 25 OH   Date Value Ref Range Status   10/04/2022 12.1 (L) 30.0 - 80.0 ng/mL Final     Hemoglobin A1c   Date Value Ref Range Status   10/04/2022 6.3 <=7.0 % Final   Sleep Study: none noted  Weight Loss Encouraged  Increase Physical Activity    6. Encounter for screening mammogram for malignant  neoplasm of breast  - Mammo Digital Screening Bilat w/ [facility]; Future      Follow up in about 8 weeks (around 5/7/2024) for Labs. In addition to their scheduled follow up, the patient has also been instructed to follow up on as needed basis.     Tiffany Harris, FNP

## 2024-03-18 ENCOUNTER — LAB VISIT (OUTPATIENT)
Dept: LAB | Facility: HOSPITAL | Age: 69
End: 2024-03-18
Attending: NURSE PRACTITIONER
Payer: MEDICARE

## 2024-03-18 DIAGNOSIS — D64.9 ANEMIA, UNSPECIFIED TYPE: Primary | ICD-10-CM

## 2024-03-18 LAB
OB IA INT NEG CNTRL (OHS): NEGATIVE
OB IA INT POS CNTRL (OHS): POSITIVE
OCCULT BLD IA (OHS): NEGATIVE

## 2024-03-18 PROCEDURE — 82274 ASSAY TEST FOR BLOOD FECAL: CPT

## 2024-03-27 ENCOUNTER — PATIENT MESSAGE (OUTPATIENT)
Dept: INTERNAL MEDICINE | Facility: CLINIC | Age: 69
End: 2024-03-27
Payer: MEDICARE

## 2024-04-14 ENCOUNTER — HOSPITAL ENCOUNTER (EMERGENCY)
Facility: HOSPITAL | Age: 69
Discharge: HOME | End: 2024-04-15
Attending: EMERGENCY MEDICINE
Payer: MEDICARE

## 2024-04-14 DIAGNOSIS — M79.10 MYALGIA: ICD-10-CM

## 2024-04-14 DIAGNOSIS — M54.31 BILATERAL SCIATICA: Primary | ICD-10-CM

## 2024-04-14 DIAGNOSIS — M54.32 BILATERAL SCIATICA: Primary | ICD-10-CM

## 2024-04-14 DIAGNOSIS — G89.4 CHRONIC PAIN SYNDROME: ICD-10-CM

## 2024-04-14 LAB
ALBUMIN SERPL-MCNC: 3.2 G/DL (ref 3.4–4.8)
ALBUMIN/GLOB SERPL: 0.7 RATIO (ref 1.1–2)
ALP SERPL-CCNC: 136 UNIT/L (ref 40–150)
ALT SERPL-CCNC: 11 UNIT/L (ref 0–55)
AST SERPL-CCNC: 15 UNIT/L (ref 5–34)
BASOPHILS # BLD AUTO: 0.03 X10(3)/MCL
BASOPHILS NFR BLD AUTO: 0.3 %
BILIRUB SERPL-MCNC: 0.2 MG/DL
BUN SERPL-MCNC: 17.5 MG/DL (ref 9.8–20.1)
CALCIUM SERPL-MCNC: 8.7 MG/DL (ref 8.4–10.2)
CHLORIDE SERPL-SCNC: 110 MMOL/L (ref 98–107)
CO2 SERPL-SCNC: 23 MMOL/L (ref 23–31)
CREAT SERPL-MCNC: 0.81 MG/DL (ref 0.55–1.02)
EOSINOPHIL # BLD AUTO: 0.23 X10(3)/MCL (ref 0–0.9)
EOSINOPHIL NFR BLD AUTO: 2.5 %
ERYTHROCYTE [DISTWIDTH] IN BLOOD BY AUTOMATED COUNT: 16.4 % (ref 11.5–17)
GFR SERPLBLD CREATININE-BSD FMLA CKD-EPI: >60 MLS/MIN/1.73/M2
GLOBULIN SER-MCNC: 4.3 GM/DL (ref 2.4–3.5)
GLUCOSE SERPL-MCNC: 93 MG/DL (ref 82–115)
HCT VFR BLD AUTO: 27.3 % (ref 37–47)
HGB BLD-MCNC: 8.2 G/DL (ref 12–16)
IMM GRANULOCYTES # BLD AUTO: 0.03 X10(3)/MCL (ref 0–0.04)
IMM GRANULOCYTES NFR BLD AUTO: 0.3 %
LYMPHOCYTES # BLD AUTO: 2.06 X10(3)/MCL (ref 0.6–4.6)
LYMPHOCYTES NFR BLD AUTO: 22 %
MCH RBC QN AUTO: 21.2 PG (ref 27–31)
MCHC RBC AUTO-ENTMCNC: 30 G/DL (ref 33–36)
MCV RBC AUTO: 70.5 FL (ref 80–94)
MONOCYTES # BLD AUTO: 0.69 X10(3)/MCL (ref 0.1–1.3)
MONOCYTES NFR BLD AUTO: 7.4 %
NEUTROPHILS # BLD AUTO: 6.32 X10(3)/MCL (ref 2.1–9.2)
NEUTROPHILS NFR BLD AUTO: 67.5 %
NRBC BLD AUTO-RTO: 0 %
PLATELET # BLD AUTO: 270 X10(3)/MCL (ref 130–400)
PMV BLD AUTO: 10.3 FL (ref 7.4–10.4)
POTASSIUM SERPL-SCNC: 4.3 MMOL/L (ref 3.5–5.1)
PROT SERPL-MCNC: 7.5 GM/DL (ref 5.8–7.6)
RBC # BLD AUTO: 3.87 X10(6)/MCL (ref 4.2–5.4)
SODIUM SERPL-SCNC: 142 MMOL/L (ref 136–145)
WBC # SPEC AUTO: 9.36 X10(3)/MCL (ref 4.5–11.5)

## 2024-04-14 PROCEDURE — 80053 COMPREHEN METABOLIC PANEL: CPT

## 2024-04-14 PROCEDURE — 25000003 PHARM REV CODE 250

## 2024-04-14 PROCEDURE — 85025 COMPLETE CBC W/AUTO DIFF WBC: CPT

## 2024-04-14 PROCEDURE — 82550 ASSAY OF CK (CPK): CPT | Performed by: EMERGENCY MEDICINE

## 2024-04-14 PROCEDURE — 99283 EMERGENCY DEPT VISIT LOW MDM: CPT

## 2024-04-14 RX ORDER — HYDROCODONE BITARTRATE AND ACETAMINOPHEN 7.5; 325 MG/1; MG/1
1 TABLET ORAL
Status: COMPLETED | OUTPATIENT
Start: 2024-04-14 | End: 2024-04-14

## 2024-04-14 RX ADMIN — HYDROCODONE BITARTRATE AND ACETAMINOPHEN 1 TABLET: 7.5; 325 TABLET ORAL at 10:04

## 2024-04-14 NOTE — FIRST PROVIDER EVALUATION
"Medical screening examination initiated.  I have conducted a focused provider triage encounter, findings are as follows:    Brief history of present illness:  69 year old female with history of chronic pain presents to pain from "the neck on down". She states that she was in therapy but can't go anymore    Vitals:    04/14/24 1824   BP: (!) 148/69   Pulse: 77   Resp: 18   Temp: 98.7 °F (37.1 °C)   SpO2: 98%       Pertinent physical exam:  Awake and alert, NAD    Brief workup plan:  Labs    Preliminary workup initiated; this workup will be continued and followed by the physician or advanced practice provider that is assigned to the patient when roomed.  "

## 2024-04-15 VITALS
TEMPERATURE: 99 F | HEART RATE: 79 BPM | DIASTOLIC BLOOD PRESSURE: 72 MMHG | OXYGEN SATURATION: 99 % | SYSTOLIC BLOOD PRESSURE: 127 MMHG | RESPIRATION RATE: 22 BRPM

## 2024-04-15 LAB
APPEARANCE UR: CLEAR
BACTERIA #/AREA URNS AUTO: ABNORMAL /HPF
BILIRUB UR QL STRIP.AUTO: NEGATIVE
CK SERPL-CCNC: 79 U/L (ref 29–168)
COLOR UR AUTO: ABNORMAL
GLUCOSE UR QL STRIP.AUTO: NORMAL
KETONES UR QL STRIP.AUTO: NEGATIVE
LEUKOCYTE ESTERASE UR QL STRIP.AUTO: NEGATIVE
MUCOUS THREADS URNS QL MICRO: ABNORMAL /LPF
NITRITE UR QL STRIP.AUTO: NEGATIVE
PH UR STRIP.AUTO: 5.5 [PH]
PROT UR QL STRIP.AUTO: NEGATIVE
RBC #/AREA URNS AUTO: ABNORMAL /HPF
RBC UR QL AUTO: NEGATIVE
SP GR UR STRIP.AUTO: 1.01 (ref 1–1.03)
SQUAMOUS #/AREA URNS LPF: ABNORMAL /HPF
UROBILINOGEN UR STRIP-ACNC: NORMAL
WBC #/AREA URNS AUTO: ABNORMAL /HPF

## 2024-04-15 PROCEDURE — 81001 URINALYSIS AUTO W/SCOPE: CPT

## 2024-04-15 RX ORDER — HYDROCODONE BITARTRATE AND ACETAMINOPHEN 7.5; 325 MG/1; MG/1
1 TABLET ORAL EVERY 8 HOURS PRN
Qty: 9 TABLET | Refills: 0 | Status: SHIPPED | OUTPATIENT
Start: 2024-04-15 | End: 2024-05-07

## 2024-04-15 NOTE — ED NOTES
Lobby Round. Pt talking on telephone in lobby with third party. Pt denies any changes from original triage complaint. Vitals assessed. Pt does not appear to be in any immediate distress at this time. Pt notified of staff waiting on urine specimen. Has cup but states she don't need to go to bathroom right now.

## 2024-04-15 NOTE — ED PROVIDER NOTES
"Encounter Date: 4/14/2024       History     Chief Complaint   Patient presents with    Generalized Body Aches     Pt. C/o generalized body aches.. reports has started going to therapy and "I can't go no more".. poor historian.. denies dysuria, fever.. awaiting pain managment appointment but missed her appointment      Patient is a 69 year old female with a history of HLD, HTN, and chronic pain that presents to the ED for generalized body pain. She is complaining of numbness and pain to her shoulders and hands, all the way to her feet. Symptoms have been ongoing for years. She reported in triage that she recently started therapy. She tells me that her pain has been so severe over the past several days that she can't sleep at night. She denies any fever or recent traumatic injury.  She is ambulatory with a walker at baseline, no change. No incontinence of bowel or bladder function, no new weakness of extremities. She is taking gabapentin at home but this is no longer helping, especially at night. She states she is awaiting an appointment with pain management - went to the appointment to be told it had been canceled. Still awaiting reschedule. No other acute issues.        Review of patient's allergies indicates:   Allergen Reactions    Penicillins Hives    Aspirin Rash    Keflex [cephalexin] Rash    Tramadol Rash     Past Medical History:   Diagnosis Date    Hyperlipidemia     Hypertension     Other pulmonary embolism without acute cor pulmonale     Personal history of colonic polyps 04/14/2022     Past Surgical History:   Procedure Laterality Date    COLONOSCOPY  04/14/2022     Family History   Problem Relation Name Age of Onset    Heart attack Mother      Thyroid disease Mother      Diabetes Father      Thyroid disease Father      Heart attack Father       Social History     Tobacco Use    Smoking status: Never     Passive exposure: Current    Smokeless tobacco: Never   Substance Use Topics    Alcohol use: Never    " Drug use: Never     Review of Systems   Constitutional:  Negative for fever.   HENT:  Negative for nosebleeds, rhinorrhea and sore throat.    Respiratory:  Negative for cough and shortness of breath.    Cardiovascular:  Negative for chest pain.   Gastrointestinal:  Negative for abdominal pain, blood in stool, constipation, diarrhea, nausea and vomiting.   Genitourinary:  Negative for difficulty urinating, dysuria, frequency and vaginal bleeding.   Musculoskeletal:  Positive for back pain (chronic) and myalgias (chronic). Negative for neck pain.   Skin:  Negative for rash and wound.   Neurological:  Positive for numbness (BUE, BLE, chronic without acute change). Negative for weakness and headaches.       Physical Exam     Initial Vitals [04/14/24 1824]   BP Pulse Resp Temp SpO2   (!) 148/69 77 18 98.7 °F (37.1 °C) 98 %      MAP       --         Physical Exam    Nursing note and vitals reviewed.  Constitutional: She appears well-developed and well-nourished. She is not diaphoretic. No distress.   HENT:   Head: Normocephalic and atraumatic.   Mouth/Throat: Oropharynx is clear and moist.   Eyes: Conjunctivae and EOM are normal.   Neck: Neck supple.   Normal range of motion.  Cardiovascular:  Normal rate, regular rhythm and intact distal pulses.           Pulmonary/Chest: Breath sounds normal. No respiratory distress.   Abdominal: Abdomen is soft. There is no abdominal tenderness.   Musculoskeletal:         General: Tenderness (TTP to bilateral lower back without midline deformity or step-off) present. No edema. Normal range of motion.      Cervical back: Normal range of motion and neck supple.      Comments: +SLR bilaterally     Neurological: She is alert and oriented to person, place, and time. She has normal strength. No sensory deficit. GCS score is 15. GCS eye subscore is 4. GCS verbal subscore is 5. GCS motor subscore is 6.   Skin: Skin is warm and dry. Capillary refill takes less than 2 seconds. No rash noted.    Psychiatric: She has a normal mood and affect.         ED Course   Procedures  Labs Reviewed   COMPREHENSIVE METABOLIC PANEL - Abnormal; Notable for the following components:       Result Value    Chloride 110 (*)     Albumin Level 3.2 (*)     Globulin 4.3 (*)     Albumin/Globulin Ratio 0.7 (*)     All other components within normal limits   URINALYSIS, REFLEX TO URINE CULTURE - Abnormal; Notable for the following components:    Mucous, UA Trace (*)     All other components within normal limits   CBC WITH DIFFERENTIAL - Abnormal; Notable for the following components:    RBC 3.87 (*)     Hgb 8.2 (*)     Hct 27.3 (*)     MCV 70.5 (*)     MCH 21.2 (*)     MCHC 30.0 (*)     All other components within normal limits   CK - Normal   CBC W/ AUTO DIFFERENTIAL    Narrative:     The following orders were created for panel order CBC auto differential.  Procedure                               Abnormality         Status                     ---------                               -----------         ------                     CBC with Differential[[id number]]       Abnormal            Final result                 Please view results for these tests on the individual orders.          Imaging Results    None          Medications   HYDROcodone-acetaminophen 7.5-325 mg per tablet 1 tablet (1 tablet Oral Given 4/14/24 0540)     Medical Decision Making  68 yo F with chronic pain, neuropathy, here for pain control. No recent trauma, no concern for TRIXIE at this time. She is afebrile, nontoxic in appearance. Exam is benign, she is ambulatory. She received Norco prior to my examination with improvement in her symptoms.  She did have an appointment with pain management in March that she is trying to reschedule.  She is advised that we can not prescribe/refill prescription medication for chronic pain in the ED on a regular basis.  I will give her a few doses of Norco today to bridge her until she is able to make her appointment.  She is  also advised to follow up with her primary care provider for re-evaluation and continued pain management until she has her pain management appointment.  Labs were obtained in triage to expedite care.  They are remarkable only for anemia which is near her baseline.  She has had a slow down trend over the past several months.  This is being evaluated as an outpatient.  Stool was sent and negative for occult blood.  She was started on iron by her PCP.  She does not report any active bleeding at this time.  I do not feel her chronic pain symptoms are due to anemia at this time.  I did discuss this with her along with the fact that she should keep and continue her outpatient follow-up as scheduled for the anemia.  She verbalizes understanding.  I do not feel further emergent evaluation is warranted and she is discharged in stable condition.  She ambulated out of the ED without issue per nursing staff.    Amount and/or Complexity of Data Reviewed  External Data Reviewed: labs and notes.  Labs: ordered. Decision-making details documented in ED Course.    Risk  OTC drugs.  Prescription drug management.               ED Course as of 04/15/24 1449   Sun Apr 14, 2024   2348 Pain improved after norco. Prior to discharge, patient's daughter states she fell 2 months ago, landing on her buttocks. I offered xray of her Lspine to assess for occult/remote fracture but patient politely declines come preferring to follow up as an outpatient if need be.  She is capable of making medical decisions and verbalizes understanding that she can return at anytime to resume evaluation.  She will be discharged in stable condition with follow-up as above. [RB]      ED Course User Index  [RB] Elvira Vaughn MD          /72   Pulse 79   Temp 98.7 °F (37.1 °C)   Resp (!) 22   SpO2 99%                    Clinical Impression:  Final diagnoses:  [G89.4] Chronic pain syndrome  [M79.10] Myalgia  [M54.31, M54.32] Bilateral sciatica (Primary)           ED Disposition Condition    Discharge Stable          ED Prescriptions       Medication Sig Dispense Start Date End Date Auth. Provider    HYDROcodone-acetaminophen (NORCO) 7.5-325 mg per tablet Take 1 tablet by mouth every 8 (eight) hours as needed for Pain. 9 tablet 4/15/2024 4/18/2024 Elvira Vaughn MD          Follow-up Information       Follow up With Specialties Details Why Contact Info    Tiffany Harris, FNP Family Medicine Schedule an appointment as soon as possible for a visit in 1 day pain management [address] [facility] [address]  [phone]      [facility]  Emergency Dept Emergency Medicine  As needed, If symptoms worsen [facility] [facility] [id number] [phone]             Elvira Vaughn MD  04/15/24 9020

## 2024-05-06 ENCOUNTER — HOSPITAL ENCOUNTER (OUTPATIENT)
Dept: RADIOLOGY | Facility: HOSPITAL | Age: 69
Discharge: HOME | End: 2024-05-06
Attending: NURSE PRACTITIONER
Payer: MEDICARE

## 2024-05-06 DIAGNOSIS — Z12.31 ENCOUNTER FOR SCREENING MAMMOGRAM FOR MALIGNANT NEOPLASM OF BREAST: ICD-10-CM

## 2024-05-06 PROCEDURE — 77063 BREAST TOMOSYNTHESIS BI: CPT | Mod: TC

## 2024-05-06 PROCEDURE — 77067 SCR MAMMO BI INCL CAD: CPT | Mod: 26,,, | Performed by: RADIOLOGY

## 2024-05-06 PROCEDURE — 77067 SCR MAMMO BI INCL CAD: CPT | Mod: TC

## 2024-05-06 PROCEDURE — 77063 BREAST TOMOSYNTHESIS BI: CPT | Mod: 26,,, | Performed by: RADIOLOGY

## 2024-05-07 ENCOUNTER — LAB VISIT (OUTPATIENT)
Dept: LAB | Facility: HOSPITAL | Age: 69
End: 2024-05-07
Attending: NURSE PRACTITIONER
Payer: MEDICARE

## 2024-05-07 ENCOUNTER — OFFICE VISIT (OUTPATIENT)
Dept: INTERNAL MEDICINE | Facility: CLINIC | Age: 69
End: 2024-05-07
Payer: MEDICARE

## 2024-05-07 VITALS
HEIGHT: 66 IN | BODY MASS INDEX: 42.62 KG/M2 | WEIGHT: 265.19 LBS | DIASTOLIC BLOOD PRESSURE: 65 MMHG | TEMPERATURE: 98 F | HEART RATE: 71 BPM | SYSTOLIC BLOOD PRESSURE: 114 MMHG | RESPIRATION RATE: 18 BRPM

## 2024-05-07 DIAGNOSIS — D50.9 IRON DEFICIENCY ANEMIA, UNSPECIFIED IRON DEFICIENCY ANEMIA TYPE: Primary | ICD-10-CM

## 2024-05-07 DIAGNOSIS — G89.29 CHRONIC MIDLINE LOW BACK PAIN WITH LEFT-SIDED SCIATICA: ICD-10-CM

## 2024-05-07 DIAGNOSIS — I10 PRIMARY HYPERTENSION: Chronic | ICD-10-CM

## 2024-05-07 DIAGNOSIS — E78.49 OTHER HYPERLIPIDEMIA: Chronic | ICD-10-CM

## 2024-05-07 DIAGNOSIS — R29.898 WEAKNESS OF BOTH LEGS: ICD-10-CM

## 2024-05-07 DIAGNOSIS — D64.9 ANEMIA, UNSPECIFIED TYPE: ICD-10-CM

## 2024-05-07 DIAGNOSIS — M54.42 CHRONIC MIDLINE LOW BACK PAIN WITH LEFT-SIDED SCIATICA: ICD-10-CM

## 2024-05-07 DIAGNOSIS — E66.01 CLASS 3 SEVERE OBESITY DUE TO EXCESS CALORIES WITH SERIOUS COMORBIDITY AND BODY MASS INDEX (BMI) OF 40.0 TO 44.9 IN ADULT: Chronic | ICD-10-CM

## 2024-05-07 DIAGNOSIS — Z91.81 AT MODERATE RISK FOR FALL: ICD-10-CM

## 2024-05-07 LAB
BASOPHILS # BLD AUTO: 0.02 X10(3)/MCL
BASOPHILS NFR BLD AUTO: 0.2 %
EOSINOPHIL # BLD AUTO: 0.23 X10(3)/MCL (ref 0–0.9)
EOSINOPHIL NFR BLD AUTO: 2.6 %
ERYTHROCYTE [DISTWIDTH] IN BLOOD BY AUTOMATED COUNT: 17.3 % (ref 11.5–17)
FERRITIN SERPL-MCNC: 11.98 NG/ML (ref 4.63–204)
HCT VFR BLD AUTO: 29.9 % (ref 37–47)
HGB BLD-MCNC: 9 G/DL (ref 12–16)
IMM GRANULOCYTES # BLD AUTO: 0.03 X10(3)/MCL (ref 0–0.04)
IMM GRANULOCYTES NFR BLD AUTO: 0.3 %
IRON SATN MFR SERPL: 6 % (ref 20–50)
IRON SERPL-MCNC: 19 UG/DL (ref 50–170)
LYMPHOCYTES # BLD AUTO: 1.74 X10(3)/MCL (ref 0.6–4.6)
LYMPHOCYTES NFR BLD AUTO: 19.6 %
MCH RBC QN AUTO: 21.1 PG (ref 27–31)
MCHC RBC AUTO-ENTMCNC: 30.1 G/DL (ref 33–36)
MCV RBC AUTO: 70 FL (ref 80–94)
MONOCYTES # BLD AUTO: 0.57 X10(3)/MCL (ref 0.1–1.3)
MONOCYTES NFR BLD AUTO: 6.4 %
NEUTROPHILS # BLD AUTO: 6.31 X10(3)/MCL (ref 2.1–9.2)
NEUTROPHILS NFR BLD AUTO: 70.9 %
NRBC BLD AUTO-RTO: 0 %
PLATELET # BLD AUTO: 304 X10(3)/MCL (ref 130–400)
PMV BLD AUTO: 10.4 FL (ref 7.4–10.4)
RBC # BLD AUTO: 4.27 X10(6)/MCL (ref 4.2–5.4)
TIBC SERPL-MCNC: 326 UG/DL (ref 70–310)
TIBC SERPL-MCNC: 345 UG/DL (ref 250–450)
TRANSFERRIN SERPL-MCNC: 324 MG/DL (ref 173–360)
WBC # SPEC AUTO: 8.9 X10(3)/MCL (ref 4.5–11.5)

## 2024-05-07 PROCEDURE — 1101F PT FALLS ASSESS-DOCD LE1/YR: CPT | Mod: CPTII,,, | Performed by: NURSE PRACTITIONER

## 2024-05-07 PROCEDURE — 99215 OFFICE O/P EST HI 40 MIN: CPT | Mod: S$PBB,,, | Performed by: NURSE PRACTITIONER

## 2024-05-07 PROCEDURE — 36415 COLL VENOUS BLD VENIPUNCTURE: CPT

## 2024-05-07 PROCEDURE — 1160F RVW MEDS BY RX/DR IN RCRD: CPT | Mod: CPTII,,, | Performed by: NURSE PRACTITIONER

## 2024-05-07 PROCEDURE — 3008F BODY MASS INDEX DOCD: CPT | Mod: CPTII,,, | Performed by: NURSE PRACTITIONER

## 2024-05-07 PROCEDURE — 82728 ASSAY OF FERRITIN: CPT

## 2024-05-07 PROCEDURE — 1159F MED LIST DOCD IN RCRD: CPT | Mod: CPTII,,, | Performed by: NURSE PRACTITIONER

## 2024-05-07 PROCEDURE — 99214 OFFICE O/P EST MOD 30 MIN: CPT | Mod: PBBFAC | Performed by: NURSE PRACTITIONER

## 2024-05-07 PROCEDURE — 3288F FALL RISK ASSESSMENT DOCD: CPT | Mod: CPTII,,, | Performed by: NURSE PRACTITIONER

## 2024-05-07 PROCEDURE — 3074F SYST BP LT 130 MM HG: CPT | Mod: CPTII,,, | Performed by: NURSE PRACTITIONER

## 2024-05-07 PROCEDURE — 83540 ASSAY OF IRON: CPT

## 2024-05-07 PROCEDURE — 85025 COMPLETE CBC W/AUTO DIFF WBC: CPT

## 2024-05-07 PROCEDURE — 3078F DIAST BP <80 MM HG: CPT | Mod: CPTII,,, | Performed by: NURSE PRACTITIONER

## 2024-05-07 RX ORDER — HEPARIN 100 UNIT/ML
500 SYRINGE INTRAVENOUS
Status: CANCELLED | OUTPATIENT
Start: 2024-05-08

## 2024-05-07 RX ORDER — SODIUM CHLORIDE 0.9 % (FLUSH) 0.9 %
10 SYRINGE (ML) INJECTION
Status: CANCELLED | OUTPATIENT
Start: 2024-05-08

## 2024-05-07 RX ORDER — DIPHENHYDRAMINE HYDROCHLORIDE 50 MG/ML
50 INJECTION INTRAMUSCULAR; INTRAVENOUS ONCE AS NEEDED
Status: CANCELLED | OUTPATIENT
Start: 2024-05-08

## 2024-05-07 RX ORDER — EPINEPHRINE 0.3 MG/.3ML
0.3 INJECTION SUBCUTANEOUS ONCE AS NEEDED
Status: CANCELLED | OUTPATIENT
Start: 2024-05-08

## 2024-05-07 NOTE — PROGRESS NOTES
Patient ID: [id number]     Chief Complaint: Follow-up and Results    HPI:     Leatha Martinez is a 69 y.o. female with diagnosis of HTN, HLD, Hx of DVT, Hx of COVID-19, Obesity, Frequent UTIs. Patient seen in clinic today for follow up. Patient last seen in clinic on 03/12/2024.   Patient seen in ED for pain. Given [place], pain improved. Patient discharged home. Patient referred to Pain Management, had appt 03/14/2024, was a No Show.   At previous appt, patient started on ferrous gluconate for anemia. No improvement in anemia. FIT negative on 03/18/2024. Patient denies blood in urine/stool.   Patient had appointment with Ortho scheduled for 02/08/2024 but canceled appt; patient did not reschedule appt. Patient had appt scheduled with Pain Management on 03/14/2024 but was a No Show.   Patient also referred to Endocrinology for abnormal TSH. Thyroid US completed on 04/18/2023 due to abnormal TSH level; indicated Multinodular goiter, hypoechoic nodule at the right lobe measures 3 mm (TR 4), hyperechoic nodule at the isthmus measures 1.1 cm (TR 3). Patient seen by ENT (see below), no FNA recommended at this time. TSH decreased, T3 and T4 WNL. Patient denies anxiety, SOB, palpitations, insomnia. No appt noted, called Dr. Lang Teixeira's office and was informed that chart has been made and should be receiving a call soon to schedule appointment.   Patient prescribed Albuterol inhaler for SOB. Patient states SOB started after being diagnosed with COVID years ago. States using albuterol inhaler prn.   Patient seen by Urology (see below) on 01/04/2023, prescribed Keflex for recurrent UTIs, started with a rash about a week after, patient stopped taking antibiotic and rash improved. Patient allergic to PCN. Patient treated with keflex previously without complications.   Patient denies any other acute complaints.    Patient followed by Cardiology Clinic. Last appointment on 11/28/2023. Leatha Martinez is a 68 y.o. female with a  PMH unprovoked subsegmental PE (2013), severe COVID, HTN, HLD, venous insufficiency, obesity here as a followup. PE reportedly occurred after a flight from [place]. She also states she has a family history of blood clots and lupus. She apparently had testing done for hypercoagulability previously. Endorses ongoing SOB with exertion. Denies any further cardiac complaints. Will have patient complete Echo to rule out any underlying heart failure, structural, or valvular disease that may be contributing to her symptoms. Continue Xarelto 20mg daily for prior PE. Denies any adverse bleeding. Would evaluate causes of microcytic anemia. Continue Amlodipine 10mg daily and Lasix 20mg daily. LDL 86 per labs March 2023. Continue Simvastatin 20mg daily. Counseled on the importance of following a low-sodium, low-cholesterol, low-fat diet and exercise as tolerated. Encouraged weight loss. Refills completed per PCP. EKG today with NSR - unchanged from prior. Patient has follow up appointment scheduled for 12/03/2024.     Patient followed by Urology Clinic. Last appointment on 11/27/2023. Recurrent cystitis: Continue with the nitrofurantoin suppression.  Depending on the results of the culture the antibiotic maybe changed. Urine culture was sent today. We will call and treat if that is positive. I encouraged her to see her primary care especially if the urine culture is negative or this does not improve after treatment of an infection if it is present. Patient was scheduled for 02/29/2024 but patient was a No Show.       Patient followed by Orthopedic Clinic. Last appointment on 11/09/2023. Osteoarthritis of both knees, unspecified osteoarthritis type. Chronic right shoulder pain. Right shoulder pain, unspecified chronicity. Right knee pain, unspecified chronicity. Right rotator cuff tendinitis. Cervical radiculopathy. Lumbar radiculopathy. Plan: Physical exam and imaging findings as well as ER records discussed with patient. Patient  has multiple conditions to address in clinic today. We will start with a Medrol Dosepak and formal physical therapy. We will also set up a appointment with our partner Dr. Adams for evaluation of cervical and lumbar radiculopathy as I believe this is also contributing to her pain. We will hold off on NSAIDs as she is on a blood thinner. I would like to see the patient back in 6 weeks to assess her progress. Patient had follow up appointment scheduled for 2023 but was a NO Show.     Patient followed by ENT Clinic. Last appointment on 10/20/2023. 67 yo F referred for thyroid nodules, none meeting FNA or surveillance criteria. Pt is asymptomatic for hyperthyroid symptoms but, if those develop, would recommend referral to endocrinology. RTC prn.     I spent a total of 40 minutes on the day of the visit.  This includes face to face time and non-face to face time preparing to see the patient (eg, review of tests), obtaining and/or reviewing separately obtained history, documenting clinical information in the electronic or other health record, independently interpreting results and communicating results to the patient/family/caregiver, or care coordinator.      Review of patient's allergies indicates:   Allergen Reactions    Penicillins Hives    Aspirin Rash    Keflex [cephalexin] Rash    Tramadol Rash     Breast Cancer Screening: MMG incomplete 2024  Cervical Cancer Screening: deferred due to age  Colorectal Cancer Screening:  Colonoscopy on 2022, recommend repeat in 3 years  Diabetic Eye Exam: N/A  Diabetic Foot Exam: N/A  Lung Cancer Screening: N/A  Prostate Cancer Screening: N/A  AAA Screening: deferred due to age  Osteoporosis Screenin2020, osteopenia  Medicare Wellness: ordered  Immunizations:   Immunization History   Administered Date(s) Administered    Influenza (FLUAD) - Quadrivalent - Adjuvanted - PF *Preferred* (65+) 10/04/2022, 2023    Tdap 2017     Past Surgical  History:   Procedure Laterality Date    COLONOSCOPY  04/14/2022     family history includes Diabetes in her father; Heart attack in her father and mother; Thyroid disease in her father and mother.    Social History     Socioeconomic History    Marital status: Single    Number of children: 3   Tobacco Use    Smoking status: Never     Passive exposure: Current    Smokeless tobacco: Never   Substance and Sexual Activity    Alcohol use: Never    Drug use: Never    Sexual activity: Not Currently     Partners: Male     Social Determinants of Health     Financial Resource Strain: Medium Risk (4/4/2023)    Overall Financial Resource Strain (CARDIA)     Difficulty of Paying Living Expenses: Somewhat hard   Food Insecurity: No Food Insecurity (4/4/2023)    Hunger Vital Sign     Worried About Running Out of Food in the Last Year: Never true     Ran Out of Food in the Last Year: Never true   Transportation Needs: Unmet Transportation Needs (4/4/2023)    PRAPARE - Transportation     Lack of Transportation (Medical): Yes     Lack of Transportation (Non-Medical): Yes   Physical Activity: Unknown (4/4/2023)    Exercise Vital Sign     Days of Exercise per Week: 3 days   Stress: Stress Concern Present (4/4/2023)    [employer] [place] [employer] - Occupational Stress Questionnaire     Feeling of Stress : To some extent   Housing Stability: Low Risk  (4/4/2023)    Housing Stability Vital Sign     Unable to Pay for Housing in the Last Year: No     Number of Places Lived in the Last Year: 1     Unstable Housing in the Last Year: No     Current Outpatient Medications   Medication Instructions    albuterol (VENTOLIN HFA) 90 mcg/actuation inhaler 2 puffs, Inhalation, Every 6 hours PRN, Rescue    amLODIPine (NORVASC) 10 mg, Oral, Daily    ergocalciferol (ERGOCALCIFEROL) 50,000 Units, Oral, Every 7 days    ferrous gluconate (FERGON) 324 mg, Oral, With breakfast    furosemide (LASIX) 20 mg, Oral, Daily    gabapentin (NEURONTIN) 400  "mg, Oral, 3 times daily    pantoprazole (PROTONIX) 40 mg, Oral, Daily    rivaroxaban (XARELTO) 20 mg, Oral, With dinner    simvastatin (ZOCOR) 20 mg, Oral, Nightly       Subjective:     Review of Systems   Constitutional: Negative.    HENT: Negative.     Eyes: Negative.    Respiratory: Negative.     Cardiovascular: Negative.    Gastrointestinal: Negative.    Endocrine: Negative.    Genitourinary: Negative.    Musculoskeletal:  Positive for back pain.   Skin: Negative.    Allergic/Immunologic: Negative.    Neurological: Negative.    Hematological: Negative.    Psychiatric/Behavioral: Negative.         Objective:     Visit Vitals  /65 (BP Location: Left arm, Patient Position: Sitting, BP Method: Large (Automatic))   Pulse 71   Temp 98.2 °F (36.8 °C) (Oral)   Resp 18   Ht 5' 5.75" (1.67 m)   Wt 120.3 kg (265 lb 3.2 oz)   BMI 43.13 kg/m²       Physical Exam  Vitals reviewed.   Constitutional:       Appearance: Normal appearance. She is obese.   HENT:      Head: Normocephalic and atraumatic.      Mouth/Throat:      Mouth: Mucous membranes are moist.      Pharynx: Oropharynx is clear.   Eyes:      Extraocular Movements: Extraocular movements intact.      Conjunctiva/sclera: Conjunctivae normal.      Pupils: Pupils are equal, round, and reactive to light.   Cardiovascular:      Rate and Rhythm: Normal rate and regular rhythm.      Heart sounds: Normal heart sounds.   Pulmonary:      Effort: Pulmonary effort is normal.      Breath sounds: Normal breath sounds.   Abdominal:      General: Bowel sounds are normal.   Musculoskeletal:         General: Normal range of motion.      Cervical back: Normal range of motion.   Skin:     General: Skin is warm and dry.   Neurological:      Mental Status: She is alert and oriented to person, place, and time.      Gait: Gait abnormal.      Comments: Ambulates with rolling walker   Psychiatric:         Mood and Affect: Mood normal.         Behavior: Behavior normal.       Labs " Reviewed:     Hematology:  Lab Results   Component Value Date    WBC 8.90 05/07/2024    HGB 9.0 (L) 05/07/2024    HCT 29.9 (L) 05/07/2024     05/07/2024     Chemistry:  Lab Results   Component Value Date     04/14/2024    K 4.3 04/14/2024    CHLORIDE 110 (H) 04/14/2024    BUN 17.5 04/14/2024    CREATININE 0.81 04/14/2024    EGFRNORACEVR >60 04/14/2024    GLUCOSE 93 04/14/2024    CALCIUM 8.7 04/14/2024    ALKPHOS 136 04/14/2024    LABPROT 7.5 04/14/2024    ALBUMIN 3.2 (L) 04/14/2024    BILIDIR 0.2 03/15/2022    IBILI 0.10 03/15/2022    AST 15 04/14/2024    ALT 11 04/14/2024    MG 1.90 07/29/2023    PHOS 3.5 08/22/2021    SIIATZBM16CC 12.1 (L) 10/04/2022     Lab Results   Component Value Date    HGBA1C 6.3 10/04/2022     Lipid Panel:  Lab Results   Component Value Date    CHOL 154 03/12/2024    HDL 34 (L) 03/12/2024    .00 03/12/2024    TRIG 84 03/12/2024    TOTALCHOLEST 5 03/12/2024     Thyroid:  Lab Results   Component Value Date    TSH 0.246 (L) 03/12/2024    T3FREE 3.28 03/31/2023     Urine:  Lab Results   Component Value Date    COLORUA Light-Yellow 04/15/2024    APPEARANCEUA Clear 04/15/2024    SGUA 1.012 04/15/2024    PHUA 5.5 04/15/2024    PROTEINUA Negative 04/15/2024    GLUCOSEUA Normal 04/15/2024    KETONESUA Negative 04/15/2024    BLOODUA Negative 04/15/2024    NITRITESUA Negative 04/15/2024    LEUKOCYTESUR Negative 04/15/2024    RBCUA 0-5 04/15/2024    WBCUA 0-5 04/15/2024    BACTERIA None Seen 04/15/2024    SQEPUA Trace 04/15/2024    HYALINECASTS 0-2 (A) 03/31/2023    CREATRANDUR 87.5 03/31/2023        Assessment:       ICD-10-CM ICD-9-CM   1. Iron deficiency anemia, unspecified iron deficiency anemia type  D50.9 280.9   2. Primary hypertension  I10 401.9   3. Other hyperlipidemia  E78.49 272.4   4. Class 3 severe obesity due to excess calories with serious comorbidity and body mass index (BMI) of 40.0 to 44.9 in adult  E66.01 278.01    Z68.41 V85.41   5. At moderate risk for fall   Z91.81 V15.88   6. Weakness of both legs  R29.898 729.89   7. Chronic midline low back pain with left-sided sciatica  M54.42 724.2    G89.29 724.3     338.29       Plan:     1. Iron deficiency anemia, unspecified iron deficiency anemia type  RBC   Date Value Ref Range Status   05/07/2024 4.27 4.20 - 5.40 x10(6)/mcL Final     Hgb   Date Value Ref Range Status   05/07/2024 9.0 (L) 12.0 - 16.0 g/dL Final     Hct   Date Value Ref Range Status   05/07/2024 29.9 (L) 37.0 - 47.0 % Final     MCV   Date Value Ref Range Status   05/07/2024 70.0 (L) 80.0 - 94.0 fL Final     Iron Level   Date Value Ref Range Status   05/07/2024 19 (L) 50 - 170 ug/dL Final     Iron Binding Capacity Total   Date Value Ref Range Status   05/07/2024 345 250 - 450 ug/dL Final     Ferritin Level   Date Value Ref Range Status   05/07/2024 11.98 4.63 - 204.00 ng/mL Final   Iron level decreased, ferritin level normal. Patient started on ferrous gluconate daily, states taking medication as prescribed, no improvement in anemia.   FIT negative  MCV <80  Will order hemoglobin electrophorisis  Referral sent to Infusion Clinic for Ferrlecit  - CBC Auto Differential; Future  - Hemoglobin Electrophorsis Evaluation, Blood; Future    2. Primary hypertension  Vitals:    05/07/24 0839   BP: 114/65   Pulse: 71   Resp: 18   Temp: 98.2 °F (36.8 °C)      Results for orders placed or performed in visit on 03/31/23   Microalbumin/Creatinine Ratio, Urine   Result Value Ref Range    Urine Microalbumin 38.5 (H) <=30.0 ug/ml    Urine Creatinine 87.5 47.0 - 110.0 mg/dL    Microalbumin Creatinine Ratio 44.0 (H) 0.0 - 30.0 mg/gm Cr     Results for orders placed or performed during the hospital encounter of 12/30/23   EKG 12-lead    Collection Time: 12/30/23 10:30 PM    Narrative    Test Reason : R07.9,    Vent. Rate : 068 BPM     Atrial Rate : 068 BPM     P-R Int : 136 ms          QRS Dur : 076 ms      QT Int : 388 ms       P-R-T Axes : 031 006 009 degrees     QTc Int : 412  ms    Normal sinus rhythm  Normal ECG  When compared with ECG of 30-DEC-2023 22:29,  No significant change was found  Confirmed by Ottoniel Gross MD [id number]) on 12/31/2023 11:21:10 AM    Referred By:             Confirmed By:Ottoniel Gross MD   Continue Amlodipine 10 mg daily, Lasix 20 mg daily  DASH diet  Encouraged home blood pressure monitoring    3. Other hyperlipidemia  Continue Simvastatin 20 mg daily  Weight loss encouraged  Low fat/high fiber diet  Increase physical activity  Cholesterol Total   Date Value Ref Range Status   03/12/2024 154 <=200 mg/dL Final     HDL Cholesterol   Date Value Ref Range Status   03/12/2024 34 (L) 35 - 60 mg/dL Final     Triglyceride   Date Value Ref Range Status   03/12/2024 84 37 - 140 mg/dL Final     LDL Cholesterol   Date Value Ref Range Status   03/12/2024 103.00 50.00 - 140.00 mg/dL Final     4. Class 3 severe obesity due to excess calories with serious comorbidity and body mass index (BMI) of 40.0 to 44.9 in adult  Body mass index is 43.13 kg/m².  TSH   Date Value Ref Range Status   03/12/2024 0.246 (L) 0.350 - 4.940 uIU/mL Final     Vit D 25 OH   Date Value Ref Range Status   10/04/2022 12.1 (L) 30.0 - 80.0 ng/mL Final     Hemoglobin A1c   Date Value Ref Range Status   10/04/2022 6.3 <=7.0 % Final   Sleep Study: none noted  Weight Loss Encouraged  Increase Physical Activity    5. At moderate risk for fall  - WALKER FOR HOME USE    6. Weakness of both legs  Patient states legs feel like they want to give out at times. Patient currently participating in Physical Therapy  - WALKER FOR HOME USE    7. Chronic midline low back pain with left-sided sciatica  Patient missed appt with Pain Management  Patient given phone number to pain management to call and reschedule appointment.   - WALKER FOR HOME USE      Follow up in about 8 weeks (around 7/2/2024) for Labs. In addition to their scheduled follow up, the patient has also been instructed to follow up on as needed basis.      Tiffany Harris, P

## 2024-05-08 ENCOUNTER — PATIENT MESSAGE (OUTPATIENT)
Dept: INTERNAL MEDICINE | Facility: CLINIC | Age: 69
End: 2024-05-08
Payer: MEDICARE

## 2024-05-16 ENCOUNTER — HOSPITAL ENCOUNTER (OUTPATIENT)
Dept: RADIOLOGY | Facility: HOSPITAL | Age: 69
Discharge: HOME | End: 2024-05-16
Attending: NURSE PRACTITIONER
Payer: MEDICARE

## 2024-05-16 ENCOUNTER — DOCUMENTATION ONLY (OUTPATIENT)
Dept: RADIOLOGY | Facility: HOSPITAL | Age: 69
End: 2024-05-16
Payer: MEDICARE

## 2024-05-16 DIAGNOSIS — R92.8 ABNORMAL MAMMOGRAM: ICD-10-CM

## 2024-05-16 PROCEDURE — 76642 ULTRASOUND BREAST LIMITED: CPT | Mod: TC,LT

## 2024-05-16 PROCEDURE — 77061 BREAST TOMOSYNTHESIS UNI: CPT | Mod: TC,LT

## 2024-05-16 PROCEDURE — 77061 BREAST TOMOSYNTHESIS UNI: CPT | Mod: 26,LT,, | Performed by: RADIOLOGY

## 2024-05-16 PROCEDURE — 76642 ULTRASOUND BREAST LIMITED: CPT | Mod: 26,LT,, | Performed by: RADIOLOGY

## 2024-05-16 PROCEDURE — 77065 DX MAMMO INCL CAD UNI: CPT | Mod: TC,LT

## 2024-05-16 PROCEDURE — 77065 DX MAMMO INCL CAD UNI: CPT | Mod: 26,LT,, | Performed by: RADIOLOGY

## 2024-05-16 NOTE — PROGRESS NOTES
Patient scheduled for ultrasound left breast biopsy on 05/30/2024. Verbal and written pre-procedure instructions provided to patient and patient's daughter. Patient to continue Xaretlo prior to procedure per Dr. Walter. Patient and daughter verbalized understanding and had no questions. Encouraged them to call with questions/concerns.
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
## 2024-05-21 ENCOUNTER — OFFICE VISIT (OUTPATIENT)
Dept: PAIN MEDICINE | Facility: CLINIC | Age: 69
End: 2024-05-21
Payer: MEDICARE

## 2024-05-21 VITALS
HEIGHT: 60 IN | HEART RATE: 98 BPM | WEIGHT: 265 LBS | BODY MASS INDEX: 52.03 KG/M2 | DIASTOLIC BLOOD PRESSURE: 51 MMHG | SYSTOLIC BLOOD PRESSURE: 107 MMHG

## 2024-05-21 DIAGNOSIS — M54.16 LUMBAR RADICULAR PAIN: ICD-10-CM

## 2024-05-21 DIAGNOSIS — M54.9 DORSALGIA, UNSPECIFIED: ICD-10-CM

## 2024-05-21 DIAGNOSIS — M54.12 CERVICAL RADICULOPATHY: Primary | ICD-10-CM

## 2024-05-21 DIAGNOSIS — M54.2 CERVICALGIA: ICD-10-CM

## 2024-05-21 PROCEDURE — 3008F BODY MASS INDEX DOCD: CPT | Mod: CPTII,,, | Performed by: NURSE PRACTITIONER

## 2024-05-21 PROCEDURE — 3074F SYST BP LT 130 MM HG: CPT | Mod: CPTII,,, | Performed by: NURSE PRACTITIONER

## 2024-05-21 PROCEDURE — 1160F RVW MEDS BY RX/DR IN RCRD: CPT | Mod: CPTII,,, | Performed by: NURSE PRACTITIONER

## 2024-05-21 PROCEDURE — 3288F FALL RISK ASSESSMENT DOCD: CPT | Mod: CPTII,,, | Performed by: NURSE PRACTITIONER

## 2024-05-21 PROCEDURE — 1159F MED LIST DOCD IN RCRD: CPT | Mod: CPTII,,, | Performed by: NURSE PRACTITIONER

## 2024-05-21 PROCEDURE — 1125F AMNT PAIN NOTED PAIN PRSNT: CPT | Mod: CPTII,,, | Performed by: NURSE PRACTITIONER

## 2024-05-21 PROCEDURE — 1100F PTFALLS ASSESS-DOCD GE2>/YR: CPT | Mod: CPTII,,, | Performed by: NURSE PRACTITIONER

## 2024-05-21 PROCEDURE — 99215 OFFICE O/P EST HI 40 MIN: CPT | Mod: ,,, | Performed by: NURSE PRACTITIONER

## 2024-05-21 PROCEDURE — 3078F DIAST BP <80 MM HG: CPT | Mod: CPTII,,, | Performed by: NURSE PRACTITIONER

## 2024-05-21 RX ORDER — METHIMAZOLE 5 MG/1
5 TABLET ORAL 2 TIMES DAILY
COMMUNITY
Start: 2024-05-17

## 2024-05-21 RX ORDER — METHYLPREDNISOLONE ACETATE 40 MG/ML
40 INJECTION, SUSPENSION INTRA-ARTICULAR; INTRALESIONAL; INTRAMUSCULAR; SOFT TISSUE
Status: SHIPPED | OUTPATIENT
Start: 2024-05-21

## 2024-05-21 NOTE — LETTER
May 21, 2024       [facility]  [address], [address]  [place] [facility] [address]  Phone: [phone]  Fax: [phone]       Patient: Leatha Martinez   YOB: 1955  Date of Visit: 05/21/2024    To Whom It May Concern:    Ellie Martinez  was at [facility] on 05/21/2024. The patient may return to work/school on 5/21/24. If you have any questions or concerns, or if I can be of further assistance, please do not hesitate to contact me.    Sincerely,    Byron LedesmaNP

## 2024-05-22 ENCOUNTER — INFUSION (OUTPATIENT)
Dept: INFUSION THERAPY | Facility: HOSPITAL | Age: 69
End: 2024-05-22
Attending: INTERNAL MEDICINE
Payer: MEDICARE

## 2024-05-22 VITALS
DIASTOLIC BLOOD PRESSURE: 61 MMHG | WEIGHT: 262.31 LBS | HEIGHT: 66 IN | HEART RATE: 71 BPM | BODY MASS INDEX: 42.16 KG/M2 | SYSTOLIC BLOOD PRESSURE: 120 MMHG | OXYGEN SATURATION: 100 % | RESPIRATION RATE: 20 BRPM | TEMPERATURE: 98 F

## 2024-05-22 DIAGNOSIS — D50.9 IRON DEFICIENCY ANEMIA, UNSPECIFIED IRON DEFICIENCY ANEMIA TYPE: Primary | ICD-10-CM

## 2024-05-22 PROCEDURE — 96365 THER/PROPH/DIAG IV INF INIT: CPT

## 2024-05-22 PROCEDURE — 25000003 PHARM REV CODE 250: Performed by: NURSE PRACTITIONER

## 2024-05-22 PROCEDURE — 63600175 PHARM REV CODE 636 W HCPCS: Performed by: NURSE PRACTITIONER

## 2024-05-22 RX ORDER — SODIUM CHLORIDE 0.9 % (FLUSH) 0.9 %
10 SYRINGE (ML) INJECTION
Status: CANCELLED | OUTPATIENT
Start: 2024-05-29

## 2024-05-22 RX ORDER — HEPARIN 100 UNIT/ML
500 SYRINGE INTRAVENOUS
Status: CANCELLED | OUTPATIENT
Start: 2024-05-29

## 2024-05-22 RX ORDER — EPINEPHRINE 0.3 MG/.3ML
0.3 INJECTION SUBCUTANEOUS ONCE AS NEEDED
Status: CANCELLED | OUTPATIENT
Start: 2024-05-29

## 2024-05-22 RX ORDER — SODIUM CHLORIDE 0.9 % (FLUSH) 0.9 %
10 SYRINGE (ML) INJECTION
Status: DISCONTINUED | OUTPATIENT
Start: 2024-05-22 | End: 2024-05-22 | Stop reason: HOSPADM

## 2024-05-22 RX ORDER — DIPHENHYDRAMINE HYDROCHLORIDE 50 MG/ML
50 INJECTION INTRAMUSCULAR; INTRAVENOUS ONCE AS NEEDED
Status: CANCELLED | OUTPATIENT
Start: 2024-05-29

## 2024-05-22 RX ADMIN — SODIUM CHLORIDE: 9 INJECTION, SOLUTION INTRAVENOUS at 08:05

## 2024-05-22 RX ADMIN — SODIUM CHLORIDE 125 MG: 9 INJECTION, SOLUTION INTRAVENOUS at 08:05

## 2024-05-22 NOTE — NURSING
0742 Patient is here for [facility] #1/8.   0935 Infusion completed w/o incident.

## 2024-05-29 ENCOUNTER — DOCUMENTATION ONLY (OUTPATIENT)
Dept: INFUSION THERAPY | Facility: HOSPITAL | Age: 69
End: 2024-05-29
Payer: MEDICARE

## 2024-05-29 NOTE — PROGRESS NOTES
"  Patient was a no show. Called patient. She stated "I thought it was tomorrow." Gisela Greenwood MA, stated to tell patient just to keep her 6/5/24 appointment for her next ferrlecit(#2/8). Patient verbalizes understanding.   Patient was called on 5/28/24 and voicemail was left to remind patient about today's appointment.   "

## 2024-05-30 ENCOUNTER — HOSPITAL ENCOUNTER (OUTPATIENT)
Dept: RADIOLOGY | Facility: HOSPITAL | Age: 69
Discharge: HOME | End: 2024-05-30
Attending: NURSE PRACTITIONER
Payer: MEDICARE

## 2024-05-30 DIAGNOSIS — R92.8 ABNORMAL MAMMOGRAM: ICD-10-CM

## 2024-05-30 DIAGNOSIS — N63.22 MASS OF UPPER INNER QUADRANT OF LEFT BREAST: Primary | ICD-10-CM

## 2024-05-30 PROCEDURE — 77061 BREAST TOMOSYNTHESIS UNI: CPT | Mod: TC,LT

## 2024-05-30 PROCEDURE — 88305 TISSUE EXAM BY PATHOLOGIST: CPT | Mod: TC | Performed by: RADIOLOGY

## 2024-05-30 PROCEDURE — 77061 BREAST TOMOSYNTHESIS UNI: CPT | Mod: 26,LT,, | Performed by: RADIOLOGY

## 2024-05-30 PROCEDURE — 77065 DX MAMMO INCL CAD UNI: CPT | Mod: 26,LT,, | Performed by: RADIOLOGY

## 2024-05-30 PROCEDURE — 19083 BX BREAST 1ST LESION US IMAG: CPT | Mod: LT

## 2024-05-30 PROCEDURE — 19083 BX BREAST 1ST LESION US IMAG: CPT | Mod: LT,,, | Performed by: RADIOLOGY

## 2024-05-30 PROCEDURE — 77065 DX MAMMO INCL CAD UNI: CPT | Mod: TC,LT

## 2024-05-30 RX ORDER — LIDOCAINE HCL/EPINEPHRINE/PF 2%-1:200K
VIAL (ML) INJECTION
Status: DISCONTINUED
Start: 2024-05-30 | End: 2024-05-31 | Stop reason: HOSPADM

## 2024-05-30 RX ORDER — LIDOCAINE HYDROCHLORIDE 20 MG/ML
INJECTION, SOLUTION EPIDURAL; INFILTRATION; INTRACAUDAL; PERINEURAL
Status: DISCONTINUED
Start: 2024-05-30 | End: 2024-05-31 | Stop reason: HOSPADM

## 2024-05-31 LAB
ESTROGEN SERPL-MCNC: NORMAL PG/ML
INSULIN SERPL-ACNC: NORMAL U[IU]/ML
LAB AP CLINICAL INFORMATION: NORMAL
LAB AP GROSS DESCRIPTION: NORMAL
LAB AP REPORT FOOTNOTES: NORMAL

## 2024-06-03 ENCOUNTER — TELEPHONE (OUTPATIENT)
Dept: RADIOLOGY | Facility: HOSPITAL | Age: 69
End: 2024-06-03
Payer: MEDICARE

## 2024-06-03 NOTE — TELEPHONE ENCOUNTER
----- Message from Jaxon Walter MD sent at 5/31/2024  5:26 PM CDT -----  Regarding: Benign Pathology  Please see below.     Mariposa, please communicate the results of the biopsy and recommendations to the patient.    Thanks.          Pathology is now available for review, and demonstrates:  Ultrasound-guided core biopsy of the left upper inner breast 6 x 5 x 6 mm complex cystic and solid mass at the 11 o'clock location, 4 cm from the nipple.  Hydromark open coil-shaped biopsy clip is in appropriate position.  - Benign breast with sclerosing fibrosis, usual ductal hyperplasia, and dilated ducts.  - No evidence of malignancy.       Please see pathology report for full details. These pathology results are concordant with imaging findings.     Recommend continued annual screening mammography due in 05/2025, according to American College of Radiology guidelines.    Pathology results and recommendations will be communicated by Mariposa Busch [facility] nurse navigator, to the patient/provider and documented in the electronic medical record.

## 2024-06-03 NOTE — NURSING
Breast biopsy pathology results and recommendations discussed with patient's daughter via telephone. She verbalized understanding and had no questions at this time. Encouraged her to call with questions/concerns.     Attempted to reach patient, as well. No answer. LVM with call back number.

## 2024-06-05 ENCOUNTER — INFUSION (OUTPATIENT)
Dept: INFUSION THERAPY | Facility: HOSPITAL | Age: 69
End: 2024-06-05
Attending: INTERNAL MEDICINE
Payer: MEDICARE

## 2024-06-05 VITALS
SYSTOLIC BLOOD PRESSURE: 141 MMHG | BODY MASS INDEX: 42.17 KG/M2 | TEMPERATURE: 99 F | DIASTOLIC BLOOD PRESSURE: 54 MMHG | OXYGEN SATURATION: 98 % | HEIGHT: 66 IN | HEART RATE: 73 BPM | WEIGHT: 262.38 LBS | RESPIRATION RATE: 20 BRPM

## 2024-06-05 DIAGNOSIS — D50.9 IRON DEFICIENCY ANEMIA, UNSPECIFIED IRON DEFICIENCY ANEMIA TYPE: Primary | ICD-10-CM

## 2024-06-05 PROCEDURE — 25000003 PHARM REV CODE 250: Performed by: NURSE PRACTITIONER

## 2024-06-05 PROCEDURE — 96365 THER/PROPH/DIAG IV INF INIT: CPT

## 2024-06-05 PROCEDURE — 63600175 PHARM REV CODE 636 W HCPCS: Performed by: NURSE PRACTITIONER

## 2024-06-05 RX ORDER — DIPHENHYDRAMINE HYDROCHLORIDE 50 MG/ML
50 INJECTION INTRAMUSCULAR; INTRAVENOUS ONCE AS NEEDED
Status: DISCONTINUED | OUTPATIENT
Start: 2024-06-05 | End: 2024-06-05 | Stop reason: HOSPADM

## 2024-06-05 RX ORDER — DIPHENHYDRAMINE HYDROCHLORIDE 50 MG/ML
50 INJECTION INTRAMUSCULAR; INTRAVENOUS ONCE AS NEEDED
Status: CANCELLED | OUTPATIENT
Start: 2024-06-12

## 2024-06-05 RX ORDER — SODIUM CHLORIDE 0.9 % (FLUSH) 0.9 %
10 SYRINGE (ML) INJECTION
Status: CANCELLED | OUTPATIENT
Start: 2024-06-12

## 2024-06-05 RX ORDER — SODIUM CHLORIDE 0.9 % (FLUSH) 0.9 %
10 SYRINGE (ML) INJECTION
Status: DISCONTINUED | OUTPATIENT
Start: 2024-06-05 | End: 2024-06-05 | Stop reason: HOSPADM

## 2024-06-05 RX ORDER — HEPARIN 100 UNIT/ML
500 SYRINGE INTRAVENOUS
Status: CANCELLED | OUTPATIENT
Start: 2024-06-12

## 2024-06-05 RX ORDER — EPINEPHRINE 0.3 MG/.3ML
0.3 INJECTION SUBCUTANEOUS ONCE AS NEEDED
Status: DISCONTINUED | OUTPATIENT
Start: 2024-06-05 | End: 2024-06-05 | Stop reason: HOSPADM

## 2024-06-05 RX ORDER — EPINEPHRINE 0.3 MG/.3ML
0.3 INJECTION SUBCUTANEOUS ONCE AS NEEDED
Status: CANCELLED | OUTPATIENT
Start: 2024-06-12

## 2024-06-05 RX ADMIN — SODIUM CHLORIDE 125 MG: 9 INJECTION, SOLUTION INTRAVENOUS at 12:06

## 2024-06-05 RX ADMIN — SODIUM CHLORIDE: 9 INJECTION, SOLUTION INTRAVENOUS at 12:06

## 2024-06-05 NOTE — LETTER
June 5, 2024      [facility]  [address] [address]  Phone: [phone]  Fax: [phone]       Patient: Leatha Martinez   YOB: 1955  Date of Visit: 06/05/2024    To Whom It May Concern:    Ellie Martinez  was at [facility] on 06/05/2024. The patient may return to work/school on 6/624 with no restrictions. If you have any questions or concerns, or if I can be of further assistance, please do not hesitate to contact me.    Sincerely,    Virginia Howe RN      ----- Message from Sue Hill sent at 2/23/2017 11:32 AM CST -----  Patient is returning Altagracia's call please call [phone] (home)

## 2024-06-05 NOTE — NURSING
Pt ambulated to Infusion Clinic using own walker, chief complaint of being tired; pt received Ferrlecit infusion # 2 of 8 via peripheral IV without incident; pt requested and given a work excuse so she can give it to her daughter who is her transportation; AVS given & f/u appts reviewed with patient who verbalized understanding.

## 2024-06-13 ENCOUNTER — INFUSION (OUTPATIENT)
Dept: INFUSION THERAPY | Facility: HOSPITAL | Age: 69
End: 2024-06-13
Attending: INTERNAL MEDICINE
Payer: MEDICARE

## 2024-06-13 VITALS
BODY MASS INDEX: 42.13 KG/M2 | OXYGEN SATURATION: 98 % | WEIGHT: 262.13 LBS | SYSTOLIC BLOOD PRESSURE: 149 MMHG | RESPIRATION RATE: 20 BRPM | DIASTOLIC BLOOD PRESSURE: 63 MMHG | HEART RATE: 76 BPM | HEIGHT: 66 IN | TEMPERATURE: 98 F

## 2024-06-13 DIAGNOSIS — D50.9 IRON DEFICIENCY ANEMIA, UNSPECIFIED IRON DEFICIENCY ANEMIA TYPE: Primary | ICD-10-CM

## 2024-06-13 PROCEDURE — 25000003 PHARM REV CODE 250: Performed by: NURSE PRACTITIONER

## 2024-06-13 PROCEDURE — 63600175 PHARM REV CODE 636 W HCPCS: Performed by: NURSE PRACTITIONER

## 2024-06-13 PROCEDURE — 96365 THER/PROPH/DIAG IV INF INIT: CPT

## 2024-06-13 RX ORDER — DIPHENHYDRAMINE HYDROCHLORIDE 50 MG/ML
50 INJECTION INTRAMUSCULAR; INTRAVENOUS ONCE AS NEEDED
Status: CANCELLED | OUTPATIENT
Start: 2024-06-19

## 2024-06-13 RX ORDER — EPINEPHRINE 0.3 MG/.3ML
0.3 INJECTION SUBCUTANEOUS ONCE AS NEEDED
Status: CANCELLED | OUTPATIENT
Start: 2024-06-19

## 2024-06-13 RX ORDER — HEPARIN 100 UNIT/ML
500 SYRINGE INTRAVENOUS
Status: CANCELLED | OUTPATIENT
Start: 2024-06-19

## 2024-06-13 RX ORDER — SODIUM CHLORIDE 0.9 % (FLUSH) 0.9 %
10 SYRINGE (ML) INJECTION
Status: DISCONTINUED | OUTPATIENT
Start: 2024-06-13 | End: 2024-06-13 | Stop reason: HOSPADM

## 2024-06-13 RX ORDER — SODIUM CHLORIDE 0.9 % (FLUSH) 0.9 %
10 SYRINGE (ML) INJECTION
Status: CANCELLED | OUTPATIENT
Start: 2024-06-19

## 2024-06-13 RX ADMIN — SODIUM CHLORIDE: 9 INJECTION, SOLUTION INTRAVENOUS at 10:06

## 2024-06-13 RX ADMIN — SODIUM CHLORIDE 125 MG: 9 INJECTION, SOLUTION INTRAVENOUS at 10:06

## 2024-06-19 ENCOUNTER — INFUSION (OUTPATIENT)
Dept: INFUSION THERAPY | Facility: HOSPITAL | Age: 69
End: 2024-06-19
Attending: INTERNAL MEDICINE
Payer: MEDICARE

## 2024-06-19 VITALS
BODY MASS INDEX: 41.61 KG/M2 | DIASTOLIC BLOOD PRESSURE: 60 MMHG | HEART RATE: 67 BPM | TEMPERATURE: 98 F | HEIGHT: 66 IN | RESPIRATION RATE: 18 BRPM | SYSTOLIC BLOOD PRESSURE: 146 MMHG | OXYGEN SATURATION: 99 % | WEIGHT: 258.88 LBS

## 2024-06-19 DIAGNOSIS — D50.9 IRON DEFICIENCY ANEMIA, UNSPECIFIED IRON DEFICIENCY ANEMIA TYPE: Primary | ICD-10-CM

## 2024-06-19 PROCEDURE — 63600175 PHARM REV CODE 636 W HCPCS: Performed by: NURSE PRACTITIONER

## 2024-06-19 PROCEDURE — 96365 THER/PROPH/DIAG IV INF INIT: CPT

## 2024-06-19 PROCEDURE — 25000003 PHARM REV CODE 250: Performed by: NURSE PRACTITIONER

## 2024-06-19 RX ORDER — EPINEPHRINE 0.3 MG/.3ML
0.3 INJECTION SUBCUTANEOUS ONCE AS NEEDED
Status: DISCONTINUED | OUTPATIENT
Start: 2024-06-19 | End: 2024-06-19 | Stop reason: HOSPADM

## 2024-06-19 RX ORDER — SODIUM CHLORIDE 0.9 % (FLUSH) 0.9 %
10 SYRINGE (ML) INJECTION
Status: DISCONTINUED | OUTPATIENT
Start: 2024-06-19 | End: 2024-06-19 | Stop reason: HOSPADM

## 2024-06-19 RX ORDER — DIPHENHYDRAMINE HYDROCHLORIDE 50 MG/ML
50 INJECTION INTRAMUSCULAR; INTRAVENOUS ONCE AS NEEDED
Status: DISCONTINUED | OUTPATIENT
Start: 2024-06-19 | End: 2024-06-19 | Stop reason: HOSPADM

## 2024-06-19 RX ORDER — HEPARIN 100 UNIT/ML
500 SYRINGE INTRAVENOUS
OUTPATIENT
Start: 2024-06-26

## 2024-06-19 RX ORDER — SODIUM CHLORIDE 0.9 % (FLUSH) 0.9 %
10 SYRINGE (ML) INJECTION
OUTPATIENT
Start: 2024-06-26

## 2024-06-19 RX ORDER — EPINEPHRINE 0.3 MG/.3ML
0.3 INJECTION SUBCUTANEOUS ONCE AS NEEDED
OUTPATIENT
Start: 2024-06-26

## 2024-06-19 RX ORDER — DIPHENHYDRAMINE HYDROCHLORIDE 50 MG/ML
50 INJECTION INTRAMUSCULAR; INTRAVENOUS ONCE AS NEEDED
OUTPATIENT
Start: 2024-06-26

## 2024-06-19 RX ADMIN — SODIUM CHLORIDE 125 MG: 9 INJECTION, SOLUTION INTRAVENOUS at 08:06

## 2024-06-19 RX ADMIN — SODIUM CHLORIDE: 9 INJECTION, SOLUTION INTRAVENOUS at 08:06

## 2024-06-19 NOTE — NURSING
Infusion Note:  Pt has an appointment today for: Infusion    Treatment Regimen: #4/8 Ferrlecit every week;      Given via PIV    Nursing note:  Pt reports 8/10 bilateral knee pain. Ambulates independently with walker. Otherwise denies any S/S. Pt tolerated infusion.    Next infusion appointment is for: Infusion

## 2024-06-26 ENCOUNTER — INFUSION (OUTPATIENT)
Dept: INFUSION THERAPY | Facility: HOSPITAL | Age: 69
End: 2024-06-26
Attending: INTERNAL MEDICINE
Payer: MEDICARE

## 2024-06-26 VITALS
WEIGHT: 258.81 LBS | BODY MASS INDEX: 43.12 KG/M2 | HEIGHT: 65 IN | DIASTOLIC BLOOD PRESSURE: 52 MMHG | TEMPERATURE: 99 F | SYSTOLIC BLOOD PRESSURE: 109 MMHG | HEART RATE: 63 BPM | RESPIRATION RATE: 20 BRPM | OXYGEN SATURATION: 99 %

## 2024-06-26 DIAGNOSIS — D50.9 IRON DEFICIENCY ANEMIA, UNSPECIFIED IRON DEFICIENCY ANEMIA TYPE: Primary | ICD-10-CM

## 2024-06-26 PROCEDURE — 25000003 PHARM REV CODE 250: Performed by: NURSE PRACTITIONER

## 2024-06-26 PROCEDURE — 96365 THER/PROPH/DIAG IV INF INIT: CPT

## 2024-06-26 PROCEDURE — 63600175 PHARM REV CODE 636 W HCPCS: Performed by: NURSE PRACTITIONER

## 2024-06-26 RX ORDER — SODIUM CHLORIDE 0.9 % (FLUSH) 0.9 %
10 SYRINGE (ML) INJECTION
OUTPATIENT
Start: 2024-07-03

## 2024-06-26 RX ORDER — DIPHENHYDRAMINE HYDROCHLORIDE 50 MG/ML
50 INJECTION INTRAMUSCULAR; INTRAVENOUS ONCE AS NEEDED
OUTPATIENT
Start: 2024-07-03

## 2024-06-26 RX ORDER — HEPARIN 100 UNIT/ML
500 SYRINGE INTRAVENOUS
Status: DISCONTINUED | OUTPATIENT
Start: 2024-06-26 | End: 2024-06-26 | Stop reason: HOSPADM

## 2024-06-26 RX ORDER — EPINEPHRINE 0.3 MG/.3ML
0.3 INJECTION SUBCUTANEOUS ONCE AS NEEDED
OUTPATIENT
Start: 2024-07-03

## 2024-06-26 RX ORDER — SODIUM CHLORIDE 0.9 % (FLUSH) 0.9 %
10 SYRINGE (ML) INJECTION
Status: DISCONTINUED | OUTPATIENT
Start: 2024-06-26 | End: 2024-06-26 | Stop reason: HOSPADM

## 2024-06-26 RX ORDER — HEPARIN 100 UNIT/ML
500 SYRINGE INTRAVENOUS
OUTPATIENT
Start: 2024-07-03

## 2024-06-26 RX ORDER — DIPHENHYDRAMINE HYDROCHLORIDE 50 MG/ML
50 INJECTION INTRAMUSCULAR; INTRAVENOUS ONCE AS NEEDED
Status: DISCONTINUED | OUTPATIENT
Start: 2024-06-26 | End: 2024-06-26 | Stop reason: HOSPADM

## 2024-06-26 RX ORDER — EPINEPHRINE 0.3 MG/.3ML
0.3 INJECTION SUBCUTANEOUS ONCE AS NEEDED
Status: DISCONTINUED | OUTPATIENT
Start: 2024-06-26 | End: 2024-06-26 | Stop reason: HOSPADM

## 2024-06-26 RX ADMIN — SODIUM CHLORIDE: 9 INJECTION, SOLUTION INTRAVENOUS at 01:06

## 2024-06-26 RX ADMIN — SODIUM CHLORIDE 125 MG: 9 INJECTION, SOLUTION INTRAVENOUS at 01:06

## 2024-06-26 NOTE — NURSING
1130 Arrive for tx 5/8 Ferrlecit infusion c/o of knee pain takes home medications for relief of pain.

## 2024-06-28 ENCOUNTER — HOSPITAL ENCOUNTER (OUTPATIENT)
Dept: RADIOLOGY | Facility: HOSPITAL | Age: 69
Discharge: HOME | End: 2024-06-28
Attending: NURSE PRACTITIONER
Payer: MEDICARE

## 2024-06-28 DIAGNOSIS — M54.12 CERVICAL RADICULOPATHY: ICD-10-CM

## 2024-06-28 DIAGNOSIS — M54.16 LUMBAR RADICULAR PAIN: ICD-10-CM

## 2024-06-28 DIAGNOSIS — M54.9 DORSALGIA, UNSPECIFIED: ICD-10-CM

## 2024-06-28 DIAGNOSIS — M54.2 CERVICALGIA: ICD-10-CM

## 2024-06-28 NOTE — PROGRESS NOTES
MRI CERVICAL AND LUMBAR W/O UNABLE TO BE COMPLETED TODAY DUE TO PATIENT'S BODY HABITUS. PATIENT DOESN'T FIT IN BORE OF SCANNER. IT IS TOO TIGHT ON HER ARMS. PATIENT GIVEN A LIST OF OTHER FACILITIES THAT MAY BE ABLE TO ACCOMMODATE AND THE NUMBER TO RESCHEDULE.

## 2024-07-03 ENCOUNTER — INFUSION (OUTPATIENT)
Dept: INFUSION THERAPY | Facility: HOSPITAL | Age: 69
End: 2024-07-03
Attending: INTERNAL MEDICINE
Payer: MEDICARE

## 2024-07-03 VITALS
WEIGHT: 257 LBS | HEART RATE: 67 BPM | TEMPERATURE: 98 F | SYSTOLIC BLOOD PRESSURE: 141 MMHG | RESPIRATION RATE: 20 BRPM | OXYGEN SATURATION: 99 % | DIASTOLIC BLOOD PRESSURE: 53 MMHG | HEIGHT: 65 IN | BODY MASS INDEX: 42.82 KG/M2

## 2024-07-03 DIAGNOSIS — D50.9 IRON DEFICIENCY ANEMIA, UNSPECIFIED IRON DEFICIENCY ANEMIA TYPE: Primary | ICD-10-CM

## 2024-07-03 PROCEDURE — 25000003 PHARM REV CODE 250: Performed by: NURSE PRACTITIONER

## 2024-07-03 PROCEDURE — 96365 THER/PROPH/DIAG IV INF INIT: CPT

## 2024-07-03 PROCEDURE — 63600175 PHARM REV CODE 636 W HCPCS: Performed by: NURSE PRACTITIONER

## 2024-07-03 RX ORDER — HEPARIN 100 UNIT/ML
500 SYRINGE INTRAVENOUS
OUTPATIENT
Start: 2024-07-10

## 2024-07-03 RX ORDER — SODIUM CHLORIDE 0.9 % (FLUSH) 0.9 %
10 SYRINGE (ML) INJECTION
OUTPATIENT
Start: 2024-07-10

## 2024-07-03 RX ORDER — EPINEPHRINE 0.3 MG/.3ML
0.3 INJECTION SUBCUTANEOUS ONCE AS NEEDED
OUTPATIENT
Start: 2024-07-10

## 2024-07-03 RX ORDER — DIPHENHYDRAMINE HYDROCHLORIDE 50 MG/ML
50 INJECTION INTRAMUSCULAR; INTRAVENOUS ONCE AS NEEDED
OUTPATIENT
Start: 2024-07-10

## 2024-07-03 RX ORDER — SODIUM CHLORIDE 0.9 % (FLUSH) 0.9 %
10 SYRINGE (ML) INJECTION
Status: DISCONTINUED | OUTPATIENT
Start: 2024-07-03 | End: 2024-07-03 | Stop reason: HOSPADM

## 2024-07-03 RX ADMIN — SODIUM CHLORIDE: 9 INJECTION, SOLUTION INTRAVENOUS at 01:07

## 2024-07-03 RX ADMIN — SODIUM CHLORIDE 125 MG: 9 INJECTION, SOLUTION INTRAVENOUS at 01:07

## 2024-07-05 ENCOUNTER — HOSPITAL ENCOUNTER (OUTPATIENT)
Dept: RADIOLOGY | Facility: HOSPITAL | Age: 69
Discharge: HOME | End: 2024-07-05
Attending: NURSE PRACTITIONER
Payer: MEDICARE

## 2024-07-05 PROCEDURE — 72141 MRI NECK SPINE W/O DYE: CPT | Mod: TC

## 2024-07-05 PROCEDURE — 72148 MRI LUMBAR SPINE W/O DYE: CPT | Mod: TC

## 2024-07-08 ENCOUNTER — OFFICE VISIT (OUTPATIENT)
Dept: PAIN MEDICINE | Facility: CLINIC | Age: 69
End: 2024-07-08
Payer: MEDICARE

## 2024-07-08 VITALS
SYSTOLIC BLOOD PRESSURE: 132 MMHG | BODY MASS INDEX: 42.83 KG/M2 | TEMPERATURE: 98 F | WEIGHT: 257.06 LBS | HEART RATE: 65 BPM | DIASTOLIC BLOOD PRESSURE: 76 MMHG | HEIGHT: 65 IN

## 2024-07-08 DIAGNOSIS — M54.16 LUMBAR NERVE ROOT IMPINGEMENT: Primary | ICD-10-CM

## 2024-07-08 DIAGNOSIS — M48.061 LUMBAR FORAMINAL STENOSIS: ICD-10-CM

## 2024-07-08 DIAGNOSIS — M54.12 CERVICAL RADICULOPATHY: ICD-10-CM

## 2024-07-08 DIAGNOSIS — M51.36 DDD (DEGENERATIVE DISC DISEASE), LUMBAR: ICD-10-CM

## 2024-07-08 DIAGNOSIS — M50.30 DDD (DEGENERATIVE DISC DISEASE), CERVICAL: ICD-10-CM

## 2024-07-08 DIAGNOSIS — M48.02 FORAMINAL STENOSIS OF CERVICAL REGION: ICD-10-CM

## 2024-07-08 DIAGNOSIS — M47.816 LUMBAR FACET ARTHROPATHY: ICD-10-CM

## 2024-07-08 PROCEDURE — 99214 OFFICE O/P EST MOD 30 MIN: CPT | Mod: 25,,, | Performed by: NURSE PRACTITIONER

## 2024-07-08 PROCEDURE — 1101F PT FALLS ASSESS-DOCD LE1/YR: CPT | Mod: CPTII,,, | Performed by: NURSE PRACTITIONER

## 2024-07-08 PROCEDURE — 1159F MED LIST DOCD IN RCRD: CPT | Mod: CPTII,,, | Performed by: NURSE PRACTITIONER

## 2024-07-08 PROCEDURE — 3288F FALL RISK ASSESSMENT DOCD: CPT | Mod: CPTII,,, | Performed by: NURSE PRACTITIONER

## 2024-07-08 PROCEDURE — 96372 THER/PROPH/DIAG INJ SC/IM: CPT | Mod: ,,, | Performed by: NURSE PRACTITIONER

## 2024-07-08 PROCEDURE — 3078F DIAST BP <80 MM HG: CPT | Mod: CPTII,,, | Performed by: NURSE PRACTITIONER

## 2024-07-08 PROCEDURE — 3075F SYST BP GE 130 - 139MM HG: CPT | Mod: CPTII,,, | Performed by: NURSE PRACTITIONER

## 2024-07-08 PROCEDURE — 1125F AMNT PAIN NOTED PAIN PRSNT: CPT | Mod: CPTII,,, | Performed by: NURSE PRACTITIONER

## 2024-07-08 PROCEDURE — 3008F BODY MASS INDEX DOCD: CPT | Mod: CPTII,,, | Performed by: NURSE PRACTITIONER

## 2024-07-08 PROCEDURE — 1160F RVW MEDS BY RX/DR IN RCRD: CPT | Mod: CPTII,,, | Performed by: NURSE PRACTITIONER

## 2024-07-08 RX ORDER — METHYLPREDNISOLONE ACETATE 80 MG/ML
40 INJECTION, SUSPENSION INTRA-ARTICULAR; INTRALESIONAL; INTRAMUSCULAR; SOFT TISSUE
Status: COMPLETED | OUTPATIENT
Start: 2024-07-08 | End: 2024-07-08

## 2024-07-08 RX ADMIN — METHYLPREDNISOLONE ACETATE 40 MG: 80 INJECTION, SUSPENSION INTRA-ARTICULAR; INTRALESIONAL; INTRAMUSCULAR; SOFT TISSUE at 10:07

## 2024-07-08 NOTE — LETTER
PEE 1955  Leatha Martinez     To: Tiffany Harris,    Procedure Bilateral transforaminal epidural steroid injection at L3   on 24    The above patient is being scheduled for a procedure under fluoroscopy with iodine that will require to discontinue one or more of the following medications:        XXX   eliquis for 2 days      Please check one:    If patient is on Coumadin, will he/she need Lovenox in the interim?    ____  Yes        ____ No      Please check one:    ________  I will manage the Lovenox prescription before and after the injection.        Approved by: ___________________________  Date: ______________      Denied by: _____________________________  Date: ______________       Yeny Adams MD   [phone] (M)  [phone] (F)
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
## 2024-07-10 ENCOUNTER — INFUSION (OUTPATIENT)
Dept: INFUSION THERAPY | Facility: HOSPITAL | Age: 69
End: 2024-07-10
Attending: INTERNAL MEDICINE
Payer: MEDICARE

## 2024-07-10 VITALS
TEMPERATURE: 98 F | OXYGEN SATURATION: 100 % | DIASTOLIC BLOOD PRESSURE: 45 MMHG | HEART RATE: 61 BPM | SYSTOLIC BLOOD PRESSURE: 135 MMHG | BODY MASS INDEX: 43.1 KG/M2 | RESPIRATION RATE: 20 BRPM | WEIGHT: 258.69 LBS | HEIGHT: 65 IN

## 2024-07-10 DIAGNOSIS — D50.9 IRON DEFICIENCY ANEMIA, UNSPECIFIED IRON DEFICIENCY ANEMIA TYPE: Primary | ICD-10-CM

## 2024-07-10 PROCEDURE — 25000003 PHARM REV CODE 250: Performed by: NURSE PRACTITIONER

## 2024-07-10 PROCEDURE — 63600175 PHARM REV CODE 636 W HCPCS: Performed by: NURSE PRACTITIONER

## 2024-07-10 PROCEDURE — 96365 THER/PROPH/DIAG IV INF INIT: CPT

## 2024-07-10 RX ORDER — HEPARIN 100 UNIT/ML
500 SYRINGE INTRAVENOUS
OUTPATIENT
Start: 2024-07-17

## 2024-07-10 RX ORDER — DIPHENHYDRAMINE HYDROCHLORIDE 50 MG/ML
50 INJECTION INTRAMUSCULAR; INTRAVENOUS ONCE AS NEEDED
OUTPATIENT
Start: 2024-07-17

## 2024-07-10 RX ORDER — EPINEPHRINE 0.3 MG/.3ML
0.3 INJECTION SUBCUTANEOUS ONCE AS NEEDED
OUTPATIENT
Start: 2024-07-17

## 2024-07-10 RX ORDER — SODIUM CHLORIDE 0.9 % (FLUSH) 0.9 %
10 SYRINGE (ML) INJECTION
Status: DISCONTINUED | OUTPATIENT
Start: 2024-07-10 | End: 2024-07-10 | Stop reason: HOSPADM

## 2024-07-10 RX ORDER — SODIUM CHLORIDE 0.9 % (FLUSH) 0.9 %
10 SYRINGE (ML) INJECTION
OUTPATIENT
Start: 2024-07-17

## 2024-07-10 RX ADMIN — SODIUM CHLORIDE: 9 INJECTION, SOLUTION INTRAVENOUS at 12:07

## 2024-07-10 RX ADMIN — SODIUM CHLORIDE 125 MG: 9 INJECTION, SOLUTION INTRAVENOUS at 01:07

## 2024-07-17 ENCOUNTER — INFUSION (OUTPATIENT)
Dept: INFUSION THERAPY | Facility: HOSPITAL | Age: 69
End: 2024-07-17
Attending: INTERNAL MEDICINE
Payer: MEDICARE

## 2024-07-17 VITALS
BODY MASS INDEX: 43.45 KG/M2 | WEIGHT: 260.81 LBS | HEART RATE: 80 BPM | RESPIRATION RATE: 18 BRPM | HEIGHT: 65 IN | OXYGEN SATURATION: 99 % | TEMPERATURE: 98 F | SYSTOLIC BLOOD PRESSURE: 139 MMHG | DIASTOLIC BLOOD PRESSURE: 67 MMHG

## 2024-07-17 DIAGNOSIS — D50.9 IRON DEFICIENCY ANEMIA, UNSPECIFIED IRON DEFICIENCY ANEMIA TYPE: Primary | ICD-10-CM

## 2024-07-17 PROCEDURE — 25000003 PHARM REV CODE 250: Performed by: NURSE PRACTITIONER

## 2024-07-17 PROCEDURE — 63600175 PHARM REV CODE 636 W HCPCS: Performed by: NURSE PRACTITIONER

## 2024-07-17 PROCEDURE — 96365 THER/PROPH/DIAG IV INF INIT: CPT

## 2024-07-17 RX ORDER — EPINEPHRINE 0.3 MG/.3ML
0.3 INJECTION SUBCUTANEOUS ONCE AS NEEDED
Status: DISCONTINUED | OUTPATIENT
Start: 2024-07-17 | End: 2024-07-17 | Stop reason: HOSPADM

## 2024-07-17 RX ORDER — EPINEPHRINE 0.3 MG/.3ML
0.3 INJECTION SUBCUTANEOUS ONCE AS NEEDED
OUTPATIENT
Start: 2024-07-17

## 2024-07-17 RX ORDER — DIPHENHYDRAMINE HYDROCHLORIDE 50 MG/ML
50 INJECTION INTRAMUSCULAR; INTRAVENOUS ONCE AS NEEDED
Status: DISCONTINUED | OUTPATIENT
Start: 2024-07-17 | End: 2024-07-17 | Stop reason: HOSPADM

## 2024-07-17 RX ORDER — SODIUM CHLORIDE 0.9 % (FLUSH) 0.9 %
10 SYRINGE (ML) INJECTION
Status: DISCONTINUED | OUTPATIENT
Start: 2024-07-17 | End: 2024-07-17 | Stop reason: HOSPADM

## 2024-07-17 RX ORDER — HEPARIN 100 UNIT/ML
500 SYRINGE INTRAVENOUS
OUTPATIENT
Start: 2024-07-17

## 2024-07-17 RX ORDER — DIPHENHYDRAMINE HYDROCHLORIDE 50 MG/ML
50 INJECTION INTRAMUSCULAR; INTRAVENOUS ONCE AS NEEDED
OUTPATIENT
Start: 2024-07-17

## 2024-07-17 RX ORDER — SODIUM CHLORIDE 0.9 % (FLUSH) 0.9 %
10 SYRINGE (ML) INJECTION
Status: CANCELLED | OUTPATIENT
Start: 2024-07-17

## 2024-07-17 RX ADMIN — SODIUM CHLORIDE: 9 INJECTION, SOLUTION INTRAVENOUS at 01:07

## 2024-07-17 RX ADMIN — SODIUM CHLORIDE 125 MG: 9 INJECTION, SOLUTION INTRAVENOUS at 01:07

## 2024-07-17 NOTE — NURSING
Pt received Ferrlecit #8/8 via peripheral IV without incident; AVS given, no f/u Infusion appts at this time.

## 2024-07-24 ENCOUNTER — TELEPHONE (OUTPATIENT)
Dept: INTERNAL MEDICINE | Facility: CLINIC | Age: 69
End: 2024-07-24
Payer: MEDICARE

## 2024-08-01 ENCOUNTER — OFFICE VISIT (OUTPATIENT)
Dept: CARDIOLOGY | Facility: CLINIC | Age: 69
End: 2024-08-01
Payer: MEDICARE

## 2024-08-01 VITALS
HEART RATE: 60 BPM | RESPIRATION RATE: 18 BRPM | OXYGEN SATURATION: 95 % | SYSTOLIC BLOOD PRESSURE: 133 MMHG | DIASTOLIC BLOOD PRESSURE: 54 MMHG | BODY MASS INDEX: 40.29 KG/M2 | HEIGHT: 66 IN | WEIGHT: 250.69 LBS | TEMPERATURE: 98 F

## 2024-08-01 DIAGNOSIS — R06.00 PND (PAROXYSMAL NOCTURNAL DYSPNEA): Primary | ICD-10-CM

## 2024-08-01 DIAGNOSIS — E66.01 CLASS 3 SEVERE OBESITY DUE TO EXCESS CALORIES WITH SERIOUS COMORBIDITY AND BODY MASS INDEX (BMI) OF 40.0 TO 44.9 IN ADULT: Chronic | ICD-10-CM

## 2024-08-01 DIAGNOSIS — R06.83 SNORING: ICD-10-CM

## 2024-08-01 DIAGNOSIS — G47.00 FREQUENT NOCTURNAL AWAKENING: ICD-10-CM

## 2024-08-01 DIAGNOSIS — E78.49 OTHER HYPERLIPIDEMIA: Chronic | ICD-10-CM

## 2024-08-01 DIAGNOSIS — I10 PRIMARY HYPERTENSION: Chronic | ICD-10-CM

## 2024-08-01 LAB
OHS QRS DURATION: 80 MS
OHS QTC CALCULATION: 418 MS

## 2024-08-01 PROCEDURE — 99214 OFFICE O/P EST MOD 30 MIN: CPT | Mod: S$PBB,,,

## 2024-08-01 PROCEDURE — 3075F SYST BP GE 130 - 139MM HG: CPT | Mod: CPTII,,,

## 2024-08-01 PROCEDURE — 1160F RVW MEDS BY RX/DR IN RCRD: CPT | Mod: CPTII,,,

## 2024-08-01 PROCEDURE — 3008F BODY MASS INDEX DOCD: CPT | Mod: CPTII,,,

## 2024-08-01 PROCEDURE — 1126F AMNT PAIN NOTED NONE PRSNT: CPT | Mod: CPTII,,,

## 2024-08-01 PROCEDURE — 99215 OFFICE O/P EST HI 40 MIN: CPT | Mod: PBBFAC

## 2024-08-01 PROCEDURE — 1101F PT FALLS ASSESS-DOCD LE1/YR: CPT | Mod: CPTII,,,

## 2024-08-01 PROCEDURE — 93005 ELECTROCARDIOGRAM TRACING: CPT

## 2024-08-01 PROCEDURE — 3078F DIAST BP <80 MM HG: CPT | Mod: CPTII,,,

## 2024-08-01 PROCEDURE — 1159F MED LIST DOCD IN RCRD: CPT | Mod: CPTII,,,

## 2024-08-01 PROCEDURE — 3288F FALL RISK ASSESSMENT DOCD: CPT | Mod: CPTII,,,

## 2024-08-01 RX ORDER — SIMVASTATIN 40 MG/1
40 TABLET, FILM COATED ORAL NIGHTLY
Qty: 90 TABLET | Refills: 3 | Status: SHIPPED | OUTPATIENT
Start: 2024-08-01 | End: 2025-08-01

## 2024-08-01 RX ORDER — AMLODIPINE BESYLATE 10 MG/1
10 TABLET ORAL DAILY
Qty: 90 TABLET | Refills: 3 | Status: SHIPPED | OUTPATIENT
Start: 2024-08-01 | End: 2025-08-01

## 2024-08-01 RX ORDER — FUROSEMIDE 20 MG/1
20 TABLET ORAL DAILY
Qty: 90 TABLET | Refills: 2 | Status: SHIPPED | OUTPATIENT
Start: 2024-08-01

## 2024-08-01 NOTE — PATIENT INSTRUCTIONS
We will fax over anticoagulation recommendations to your doctor  Complete sleep study  Follow up in cardiology clinic in 6 months or sooner if needed   Follow up with PCP as directed  Please notify clinic if any new concerns or any change in symptoms

## 2024-08-01 NOTE — PROGRESS NOTES
CHIEF COMPLAINT:   Chief Complaint   Patient presents with    Follow-up     1 yr f/u and risk start for injections denies cardiac targets                                                  HPI:  Leatha Martinez 69 y.o. female Medical history of hypertension, hyperlipidemia, obesity, history of venous thrombosis and embolism presents to Cardiology Clinic today for 1 year follow up and ongoing care.  At last office visit patient endorsed ongoing shortness of breath with exertion and stated that the shortness a breath typically occurred with ambulation and while doing household chores.  Echocardiogram was ordered at that time which revealed normal systolic function, EF 72%, grade 1 diastolic dysfunction, overall and no further significant valvular structural disease.  See full report below.  Since then, patient states that her shortness a breath has significantly improved.  She stated that she was doing physical therapy and that actually improved her SOB/SANTIAGO significantly.    Today the patient presents for follow up, ongoing care, and for anticoagulation recommendations for upcoming PATY procedure.  She has a history of chronic low back pain and will be undergoing a epidural steroid injection at L3 under fluoroscopy.  Today she reports stable cardiac complaints.  She states that her SOB and SANTIAGO his since her last visit today the patient has been more physically active that her symptoms have improved.  Otherwise she denies any further cardiac complaints such as chest pain, palpitations, lightheadedness, dizziness, syncope for edema, or claudication symptoms.  Patient does have some ongoing joint pain in an she complains of PND, frequent nocturnal awakenings, snoring, and daytime fatigue.  She was interested in sleep study.  She is able to complete her ADLs without any issues or ischemic symptoms.  She reports compliance with all her current medications.  She states that her physical activity level is a bit limited because  of her low back pain as it causes her weakness some mobility limitations.  She denies any tobacco or other illicit drug use.  She continues to Xarelto in his tolerating very well.  She denies adverse bleeding effects.                                                                                                                                                                                                                                                                           CARDIAC TESTING:  Echo 12.21.23    Left Ventricle: The left ventricle is normal in size. Mildly increased wall thickness. There is normal systolic function. Biplane (2D) method of discs ejection fraction is 72%. Grade I diastolic dysfunction.    Right Ventricle: Right ventricle was not well visualized due to poor acoustic window. Systolic function is normal.    Aortic Valve: There is mild aortic valve sclerosis. There is mild annular calcification present.    Tricuspid Valve: There is mild to moderate regurgitation.          Patient Active Problem List   Diagnosis    Hypertension    Gastro-esophageal reflux disease without esophagitis    Other hyperlipidemia    Class 3 severe obesity due to excess calories with serious comorbidity and body mass index (BMI) of 40.0 to 44.9 in adult    Personal history of other venous thrombosis and embolism    Personal history of COVID-19    Abnormal TSH    Leukocytosis    Post-COVID chronic dyspnea    Frailty    Iron deficiency anemia     Past Surgical History:   Procedure Laterality Date    COLONOSCOPY  04/14/2022     Social History     Socioeconomic History    Marital status: Single    Number of children: 3   Tobacco Use    Smoking status: Never     Passive exposure: Current    Smokeless tobacco: Never   Substance and Sexual Activity    Alcohol use: Never    Drug use: Never    Sexual activity: Not Currently     Partners: Male     Social Determinants of Health     Financial Resource Strain: Medium Risk  (4/4/2023)    Overall Financial Resource Strain (CARDIA)     Difficulty of Paying Living Expenses: Somewhat hard   Food Insecurity: No Food Insecurity (4/4/2023)    Hunger Vital Sign     Worried About Running Out of Food in the Last Year: Never true     Ran Out of Food in the Last Year: Never true   Transportation Needs: Unmet Transportation Needs (4/4/2023)    PRAPARE - Transportation     Lack of Transportation (Medical): Yes     Lack of Transportation (Non-Medical): Yes   Physical Activity: Unknown (4/4/2023)    Exercise Vital Sign     Days of Exercise per Week: 3 days   Stress: Stress Concern Present (4/4/2023)    [employer] - Occupational Stress Questionnaire     Feeling of Stress : To some extent   Housing Stability: Low Risk  (4/4/2023)    Housing Stability Vital Sign     Unable to Pay for Housing in the Last Year: No     Number of Places Lived in the Last Year: 1     Unstable Housing in the Last Year: No        Family History   Problem Relation Name Age of Onset    Heart attack Mother      Thyroid disease Mother      Diabetes Father      Thyroid disease Father      Heart attack Father       Review of patient's allergies indicates:   Allergen Reactions    Penicillins Hives    Aspirin Rash    Keflex [cephalexin] Rash    Tramadol Rash         ROS:  Review of Systems   Constitutional:  Positive for malaise/fatigue.   HENT: Negative.     Eyes: Negative.    Respiratory:  Positive for shortness of breath.    Cardiovascular:  Positive for PND. Negative for chest pain, palpitations, orthopnea, claudication and leg swelling.   Gastrointestinal: Negative.    Genitourinary: Negative.    Musculoskeletal:  Positive for back pain, falls, joint pain and myalgias.   Skin: Negative.    Neurological: Negative.    Endo/Heme/Allergies: Negative.    Psychiatric/Behavioral: Negative.                                                                                                                           "                                                        Negative except as stated in the history of present illness. See HPI for details.    PHYSICAL EXAM:  Visit Vitals  BP (!) 133/54   Pulse 60   Temp 98.1 °F (36.7 °C) (Oral)   Resp 18   Ht 5' 6" (1.676 m)   Wt 113.7 kg (250 lb 10.6 oz)   SpO2 95%   BMI 40.46 kg/m²       Physical Exam  HENT:      Head: Normocephalic.      Nose: Nose normal.   Eyes:      Extraocular Movements: Extraocular movements intact.   Cardiovascular:      Rate and Rhythm: Normal rate and regular rhythm.      Pulses: Normal pulses.      Heart sounds: Normal heart sounds. No murmur heard.  Pulmonary:      Effort: Pulmonary effort is normal.   Abdominal:      General: There is no distension.      Palpations: Abdomen is soft.   Musculoskeletal:         General: Normal range of motion.      Cervical back: Normal range of motion.      Right lower leg: No edema.      Left lower leg: No edema.   Skin:     General: Skin is warm.   Neurological:      General: No focal deficit present.      Mental Status: She is alert.   Psychiatric:         Mood and Affect: Mood normal.         Current Outpatient Medications   Medication Instructions    albuterol (VENTOLIN HFA) 90 mcg/actuation inhaler 2 puffs, Inhalation, Every 6 hours PRN, Rescue    amLODIPine (NORVASC) 10 mg, Oral, Daily    ergocalciferol (ERGOCALCIFEROL) 50,000 Units, Oral, Every 7 days    ferrous gluconate (FERGON) 324 mg, Oral, With breakfast    furosemide (LASIX) 20 mg, Oral, Daily    gabapentin (NEURONTIN) 400 mg, Oral, 3 times daily    methIMAzole (TAPAZOLE) 5 mg, Oral, 2 times daily    pantoprazole (PROTONIX) 40 mg, Oral, Daily    rivaroxaban (XARELTO) 20 mg, Oral, With dinner    simvastatin (ZOCOR) 20 mg, Oral, Nightly        All medications, laboratory studies, cardiac diagnostic imaging reviewed.     Lab Results   Component Value Date    .00 03/12/2024    LDL 86.00 03/31/2023    TRIG 84 03/12/2024    TRIG 93 03/31/2023    " CREATININE 0.81 04/14/2024    MG 1.90 07/29/2023    K 4.3 04/14/2024        ASSESSMENT/PLAN:  Leatha Martinez is a 69 y.o. female with a PMH unprovoked subsegmental PE (2013), severe COVID, HTN, HLD, venous insufficiency, obesity here as a followup. PE reportedly occurred after a flight from [place]. She also states she has a family history of blood clots and lupus. She apparently had testing done for hypercoagulability previously.      - Endorses ongoing SOB with exertion. States that it is actually improved from her last visit. She started PT and with some reconditioning her symptoms have greatly improved   - No further anginal or anginal equivalent symptoms. No congestive symptoms   - Echo revealed normal systolic function, EF 72%, G1DD, mild to mod TR, otherwise unremarkable. See full report above  - Continue Xarelto 20mg daily for prior PE.  Denies any adverse bleeding.   - Continue Amlodipine 10mg daily and Lasix 20mg PRN (patient does not take Lasix everyday)  -  per labs March 2024   - Increase Simvastatin to 40mg daily  - Counseled on the importance of following a low-sodium, low-cholesterol, low-fat diet and exercise as tolerated  - Encouraged weight loss  - Patient is endorsing PND, frequent nocturnal awakenings, snoring, and excessive daytime sleepiness.  All symptoms are concerning for SEAN.  Will patient complete sleep study to assess for any underlying sleep apnea that may be causing symptoms  - EKG today with NSR, minimal voltage criteria for LVH, maybe normal variant, possible anterolateral infarct, age undetermined, vent rate 65 beats per minute-this is mostly stable from prior EKGs  - Anticoagulation Recommendations For Lumbar Epidural Steroid Injection:  Patient is low to moderate risk to hold Xarelto for 48 hours prior to PATY. Xarelto should be resumed as soon as possible following the procedure, once deemed safe from a surgical standpoint.       EKG today   We will fax over anticoagulation  recommendations to your doctor  Complete sleep study  Follow up in cardiology clinic in 6 months or sooner if needed   Follow up with PCP as directed  Please notify clinic if any new concerns or any change in symptoms

## 2024-08-08 ENCOUNTER — ANESTHESIA EVENT (OUTPATIENT)
Dept: SURGERY | Facility: HOSPITAL | Age: 69
End: 2024-08-08
Payer: MEDICARE

## 2024-08-09 ENCOUNTER — TELEPHONE (OUTPATIENT)
Facility: CLINIC | Age: 69
End: 2024-08-09
Payer: MEDICARE

## 2024-08-12 DIAGNOSIS — G47.33 OSA (OBSTRUCTIVE SLEEP APNEA): Primary | ICD-10-CM

## 2024-08-13 ENCOUNTER — PROCEDURE VISIT (OUTPATIENT)
Dept: SLEEP MEDICINE | Facility: HOSPITAL | Age: 69
End: 2024-08-13
Payer: MEDICARE

## 2024-08-13 DIAGNOSIS — G47.33 OSA (OBSTRUCTIVE SLEEP APNEA): ICD-10-CM

## 2024-08-13 PROCEDURE — 95810 POLYSOM 6/> YRS 4/> PARAM: CPT

## 2024-08-15 DIAGNOSIS — R06.09 POST-COVID CHRONIC DYSPNEA: ICD-10-CM

## 2024-08-15 DIAGNOSIS — R29.898 WEAKNESS OF BOTH LEGS: ICD-10-CM

## 2024-08-15 DIAGNOSIS — D50.8 IRON DEFICIENCY ANEMIA SECONDARY TO INADEQUATE DIETARY IRON INTAKE: Chronic | ICD-10-CM

## 2024-08-15 DIAGNOSIS — U09.9 POST-COVID CHRONIC DYSPNEA: ICD-10-CM

## 2024-08-15 DIAGNOSIS — K21.9 GASTRO-ESOPHAGEAL REFLUX DISEASE WITHOUT ESOPHAGITIS: Primary | Chronic | ICD-10-CM

## 2024-08-16 RX ORDER — GABAPENTIN 400 MG/1
400 CAPSULE ORAL 3 TIMES DAILY
Qty: 90 CAPSULE | Refills: 8 | Status: SHIPPED | OUTPATIENT
Start: 2024-08-16 | End: 2025-08-16

## 2024-08-16 RX ORDER — FERROUS GLUCONATE 324(38)MG
324 TABLET ORAL
Qty: 90 TABLET | Refills: 2 | Status: SHIPPED | OUTPATIENT
Start: 2024-08-16 | End: 2025-08-16

## 2024-08-16 RX ORDER — PANTOPRAZOLE SODIUM 40 MG/1
40 TABLET, DELAYED RELEASE ORAL DAILY
Qty: 90 TABLET | Refills: 2 | Status: SHIPPED | OUTPATIENT
Start: 2024-08-16

## 2024-08-16 RX ORDER — ALBUTEROL SULFATE 90 UG/1
2 INHALANT RESPIRATORY (INHALATION) EVERY 6 HOURS PRN
Qty: 18 G | Refills: 3 | Status: SHIPPED | OUTPATIENT
Start: 2024-08-16 | End: 2025-08-16

## 2024-08-21 ENCOUNTER — HOSPITAL ENCOUNTER (OUTPATIENT)
Facility: HOSPITAL | Age: 69
Discharge: HOME | End: 2024-08-21
Attending: ANESTHESIOLOGY | Admitting: ANESTHESIOLOGY
Payer: MEDICARE

## 2024-08-21 ENCOUNTER — ANESTHESIA (OUTPATIENT)
Dept: SURGERY | Facility: HOSPITAL | Age: 69
End: 2024-08-21
Payer: MEDICARE

## 2024-08-21 DIAGNOSIS — M54.9 CHRONIC BACK PAIN GREATER THAN 3 MONTHS DURATION: Primary | ICD-10-CM

## 2024-08-21 DIAGNOSIS — G89.29 CHRONIC BACK PAIN GREATER THAN 3 MONTHS DURATION: Primary | ICD-10-CM

## 2024-08-21 PROCEDURE — 63600175 PHARM REV CODE 636 W HCPCS

## 2024-08-21 PROCEDURE — 64483 NJX AA&/STRD TFRM EPI L/S 1: CPT | Mod: 50 | Performed by: ANESTHESIOLOGY

## 2024-08-21 PROCEDURE — 64483 NJX AA&/STRD TFRM EPI L/S 1: CPT | Mod: 50,,, | Performed by: ANESTHESIOLOGY

## 2024-08-21 PROCEDURE — 37000008 HC ANESTHESIA 1ST 15 MINUTES: Performed by: ANESTHESIOLOGY

## 2024-08-21 PROCEDURE — 25000003 PHARM REV CODE 250: Performed by: ANESTHESIOLOGY

## 2024-08-21 PROCEDURE — 25000003 PHARM REV CODE 250

## 2024-08-21 PROCEDURE — 63600175 PHARM REV CODE 636 W HCPCS: Mod: JZ,JG | Performed by: ANESTHESIOLOGY

## 2024-08-21 RX ORDER — BUPIVACAINE HYDROCHLORIDE 2.5 MG/ML
INJECTION, SOLUTION EPIDURAL; INFILTRATION; INTRACAUDAL
Status: DISCONTINUED
Start: 2024-08-21 | End: 2024-08-21 | Stop reason: HOSPADM

## 2024-08-21 RX ORDER — DIPHENHYDRAMINE HYDROCHLORIDE 50 MG/ML
25 INJECTION INTRAMUSCULAR; INTRAVENOUS EVERY 6 HOURS PRN
OUTPATIENT
Start: 2024-08-21

## 2024-08-21 RX ORDER — DEXAMETHASONE SODIUM PHOSPHATE 10 MG/ML
INJECTION INTRAMUSCULAR; INTRAVENOUS
Status: DISCONTINUED
Start: 2024-08-21 | End: 2024-08-21 | Stop reason: HOSPADM

## 2024-08-21 RX ORDER — SODIUM CHLORIDE, SODIUM GLUCONATE, SODIUM ACETATE, POTASSIUM CHLORIDE AND MAGNESIUM CHLORIDE 30; 37; 368; 526; 502 MG/100ML; MG/100ML; MG/100ML; MG/100ML; MG/100ML
INJECTION, SOLUTION INTRAVENOUS CONTINUOUS
OUTPATIENT
Start: 2024-08-21 | End: 2024-09-20

## 2024-08-21 RX ORDER — LIDOCAINE HYDROCHLORIDE 10 MG/ML
1 INJECTION, SOLUTION EPIDURAL; INFILTRATION; INTRACAUDAL; PERINEURAL ONCE
OUTPATIENT
Start: 2024-08-21 | End: 2024-08-21

## 2024-08-21 RX ORDER — PROPOFOL 10 MG/ML
VIAL (ML) INTRAVENOUS
Status: DISCONTINUED | OUTPATIENT
Start: 2024-08-21 | End: 2024-08-21

## 2024-08-21 RX ORDER — LIDOCAINE HYDROCHLORIDE 20 MG/ML
INJECTION INTRAVENOUS
Status: DISCONTINUED | OUTPATIENT
Start: 2024-08-21 | End: 2024-08-21

## 2024-08-21 RX ORDER — LIDOCAINE HYDROCHLORIDE 10 MG/ML
INJECTION, SOLUTION EPIDURAL; INFILTRATION; INTRACAUDAL; PERINEURAL
Status: DISCONTINUED
Start: 2024-08-21 | End: 2024-08-21 | Stop reason: HOSPADM

## 2024-08-21 RX ORDER — ACETAMINOPHEN 10 MG/ML
1000 INJECTION, SOLUTION INTRAVENOUS ONCE
OUTPATIENT
Start: 2024-08-21 | End: 2024-08-21

## 2024-08-21 RX ORDER — LIDOCAINE HYDROCHLORIDE 10 MG/ML
INJECTION, SOLUTION EPIDURAL; INFILTRATION; INTRACAUDAL; PERINEURAL
Status: DISCONTINUED | OUTPATIENT
Start: 2024-08-21 | End: 2024-08-21 | Stop reason: HOSPADM

## 2024-08-21 RX ORDER — BUPIVACAINE HYDROCHLORIDE 2.5 MG/ML
INJECTION, SOLUTION EPIDURAL; INFILTRATION; INTRACAUDAL
Status: DISCONTINUED | OUTPATIENT
Start: 2024-08-21 | End: 2024-08-21 | Stop reason: HOSPADM

## 2024-08-21 RX ORDER — GLUCAGON 1 MG
1 KIT INJECTION
OUTPATIENT
Start: 2024-08-21

## 2024-08-21 RX ORDER — DEXAMETHASONE SODIUM PHOSPHATE 10 MG/ML
INJECTION INTRAMUSCULAR; INTRAVENOUS
Status: DISCONTINUED | OUTPATIENT
Start: 2024-08-21 | End: 2024-08-21 | Stop reason: HOSPADM

## 2024-08-21 RX ORDER — ONDANSETRON HYDROCHLORIDE 2 MG/ML
4 INJECTION, SOLUTION INTRAVENOUS DAILY PRN
OUTPATIENT
Start: 2024-08-21

## 2024-08-21 RX ADMIN — PROPOFOL 50 MG: 10 INJECTION, EMULSION INTRAVENOUS at 10:08

## 2024-08-21 RX ADMIN — LIDOCAINE HYDROCHLORIDE 50 MG: 20 INJECTION INTRAVENOUS at 10:08

## 2024-08-21 NOTE — OP NOTE
Procedure:    Left L3  transforaminal epidural steroid injection    Right L3  transforaminal epidural steroid injection    Pre-Procedure Diagnoses:  Chronic back pain greater than 3 months  Lumbar degenerative disc disease  Lumbar radiculopathy  Lumbar disc displacement    Post-Procedure Diagnoses:  Chronic back pain greater than 3 months  Lumbar degenerative disc disease  Lumbar radiculopathy  Lumbar disc displacement    Anesthesia:  Local and MAC    Estimated Blood Loss:  < 2 ML    Consent:  The procedure, risks, benefits, and alternatives were discussed with the patient.  The patient voiced understanding and fully informed written consent was obtained.    Description of the Procedure:  The patient was taken to the operating room and placed in the prone position. The skin overlying the lumbar spine was prepped with Chloraprep and draped in the usual sterile fashion.  An oblique fluoroscopic view was obtained on the left side at L3, with the superior articular process of the inferior vertebral body aligned with the pedicle.  Skin anesthesia was achieved using 2 mL of lidocaine 1%.  A  22-gauge 5 -inch Quinke spinal needle was inserted and advanced under intermittent fluoroscopic views into the epidural space. Proper needle position was confirmed under AP, oblique, and lateral fluoroscopic views.  Negative aspiration for blood or CSF was confirmed. 1 mL of contrast was injected, which revealed spread into the epidural space.  Then a combination of 5 mg of dexamethasone with 1 mL of 0.25% bupivacaine was easily injected.   There was no pain on injection. The needle was removed intact and bleeding was nil.  The same procedure was repeated in identical fashion on the right side at L3.  Sterile bandages were applied. The patient was taken to the recovery room for further observation in stable condition. The patient was then discharged home with no complications.

## 2024-08-21 NOTE — TRANSFER OF CARE
"Anesthesia Transfer of Care Note    Patient: Leatha Martinez    Procedure(s) Performed: Procedure(s) (LRB):  Injection,steroid,epidural,transforaminal approach (Bilateral)    Patient location: OPS    Anesthesia Type: MAC    Transport from OR: Transported from OR on room air with adequate spontaneous ventilation    Post pain: adequate analgesia    Post assessment: no apparent anesthetic complications    Post vital signs: stable    Level of consciousness: awake    Nausea/Vomiting: no nausea/vomiting    Complications: none    Transfer of care protocol was followed    Last vitals: Visit Vitals  /73   Pulse 63   Temp 36.4 °C (97.6 °F) (Tympanic)   Resp 20   Ht 5' 6" (1.676 m)   Wt 116.5 kg (256 lb 13.4 oz)   SpO2 98%   Breastfeeding No   BMI 41.45 kg/m²     "

## 2024-08-21 NOTE — DISCHARGE SUMMARY
[facility] - Periop Services  Discharge Note  Short Stay    Procedure(s) (LRB):  Injection,steroid,epidural,transforaminal approach (Bilateral)      OUTCOME: Patient tolerated treatment/procedure well without complication and is now ready for discharge.    DISPOSITION: Home or Self Care    FINAL DIAGNOSIS:  <principal problem not specified>    FOLLOWUP: In clinic    DISCHARGE INSTRUCTIONS:  No discharge procedures on file.     TIME SPENT ON DISCHARGE: 5 minutes
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
## 2024-08-21 NOTE — ANESTHESIA POSTPROCEDURE EVALUATION
Anesthesia Post Evaluation    Patient: Leatha Martinez    Procedure(s) Performed: Procedure(s) (LRB):  Injection,steroid,epidural,transforaminal approach (Bilateral)    Final Anesthesia Type: general      Patient location during evaluation: PACU  Patient participation: Yes- Able to Participate  Level of consciousness: awake and alert  Post-procedure vital signs: reviewed and stable  Pain management: adequate  Airway patency: patent      Anesthetic complications: no      Cardiovascular status: blood pressure returned to baseline  Respiratory status: unassisted  Hydration status: euvolemic  Follow-up not needed.              Vitals Value Taken Time   /91 08/21/24 1100   Temp  08/21/24 1611   Pulse 67 08/21/24 1107   Resp  08/21/24 1611   SpO2 97 % 08/21/24 1107   Vitals shown include unfiled device data.      No case tracking events are documented in the log.      Pain/Harvey Score: Harvey Score: 10 (8/21/2024 11:02 AM)
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
## 2024-08-23 VITALS
SYSTOLIC BLOOD PRESSURE: 145 MMHG | OXYGEN SATURATION: 100 % | HEART RATE: 67 BPM | BODY MASS INDEX: 41.27 KG/M2 | RESPIRATION RATE: 20 BRPM | HEIGHT: 66 IN | TEMPERATURE: 98 F | DIASTOLIC BLOOD PRESSURE: 91 MMHG | WEIGHT: 256.81 LBS

## 2024-08-29 ENCOUNTER — LAB VISIT (OUTPATIENT)
Dept: LAB | Facility: HOSPITAL | Age: 69
End: 2024-08-29
Attending: NURSE PRACTITIONER
Payer: MEDICARE

## 2024-08-29 DIAGNOSIS — I10 PRIMARY HYPERTENSION: Chronic | ICD-10-CM

## 2024-08-29 DIAGNOSIS — D50.9 IRON DEFICIENCY ANEMIA, UNSPECIFIED IRON DEFICIENCY ANEMIA TYPE: ICD-10-CM

## 2024-08-29 DIAGNOSIS — N34.2 INFECTIVE URETHRITIS: Primary | ICD-10-CM

## 2024-08-29 DIAGNOSIS — N34.2 INFECTIVE URETHRITIS: ICD-10-CM

## 2024-08-29 LAB
BACTERIA #/AREA URNS AUTO: ABNORMAL /HPF
BACTERIA #/AREA URNS AUTO: ABNORMAL /HPF
BILIRUB UR QL STRIP.AUTO: NEGATIVE
BILIRUB UR QL STRIP.AUTO: NEGATIVE
CLARITY UR: ABNORMAL
CLARITY UR: CLEAR
COLOR UR AUTO: ABNORMAL
COLOR UR AUTO: ABNORMAL
CREAT UR-MCNC: 71.4 MG/DL (ref 45–106)
GLUCOSE UR QL STRIP: NORMAL
GLUCOSE UR QL STRIP: NORMAL
HGB UR QL STRIP: ABNORMAL
HGB UR QL STRIP: ABNORMAL
HYALINE CASTS #/AREA URNS LPF: ABNORMAL /LPF
HYALINE CASTS #/AREA URNS LPF: ABNORMAL /LPF
KETONES UR QL STRIP: NEGATIVE
KETONES UR QL STRIP: NEGATIVE
LEUKOCYTE ESTERASE UR QL STRIP: 500
LEUKOCYTE ESTERASE UR QL STRIP: 500
MICROALBUMIN UR-MCNC: 25.2 UG/ML
MICROALBUMIN/CREAT RATIO PNL UR: 35.3 MG/GM CR (ref 0–30)
MUCOUS THREADS URNS QL MICRO: ABNORMAL /LPF
MUCOUS THREADS URNS QL MICRO: ABNORMAL /LPF
NITRITE UR QL STRIP: NEGATIVE
NITRITE UR QL STRIP: NEGATIVE
PH UR STRIP: 6.5 [PH]
PH UR STRIP: 6.5 [PH]
PROT UR QL STRIP: NEGATIVE
PROT UR QL STRIP: NEGATIVE
RBC #/AREA URNS AUTO: ABNORMAL /HPF
RBC #/AREA URNS AUTO: ABNORMAL /HPF
SP GR UR STRIP.AUTO: 1.01 (ref 1–1.03)
SP GR UR STRIP.AUTO: 1.01 (ref 1–1.03)
SQUAMOUS #/AREA URNS LPF: ABNORMAL /HPF
SQUAMOUS #/AREA URNS LPF: ABNORMAL /HPF
UROBILINOGEN UR STRIP-ACNC: ABNORMAL
UROBILINOGEN UR STRIP-ACNC: ABNORMAL
WBC #/AREA URNS AUTO: ABNORMAL /HPF
WBC #/AREA URNS AUTO: ABNORMAL /HPF
WBC CLUMPS UR QL AUTO: ABNORMAL
WBC CLUMPS UR QL AUTO: ABNORMAL

## 2024-08-29 PROCEDURE — 36415 COLL VENOUS BLD VENIPUNCTURE: CPT

## 2024-08-29 PROCEDURE — 85660 RBC SICKLE CELL TEST: CPT

## 2024-08-29 PROCEDURE — 81001 URINALYSIS AUTO W/SCOPE: CPT

## 2024-08-29 PROCEDURE — 81001 URINALYSIS AUTO W/SCOPE: CPT | Mod: 91

## 2024-08-29 PROCEDURE — 87086 URINE CULTURE/COLONY COUNT: CPT

## 2024-08-29 PROCEDURE — 82570 ASSAY OF URINE CREATININE: CPT

## 2024-08-29 PROCEDURE — 83020 HEMOGLOBIN ELECTROPHORESIS: CPT

## 2024-08-31 LAB — BACTERIA UR CULT: ABNORMAL

## 2024-09-03 ENCOUNTER — TELEPHONE (OUTPATIENT)
Dept: INTERNAL MEDICINE | Facility: CLINIC | Age: 69
End: 2024-09-03
Payer: MEDICARE

## 2024-09-03 DIAGNOSIS — B96.20 E. COLI UTI: Primary | ICD-10-CM

## 2024-09-03 DIAGNOSIS — N39.0 E. COLI UTI: Primary | ICD-10-CM

## 2024-09-03 LAB
HGB A MFR BLD ELPH: 61.7 % (ref 95.8–98)
HGB A2 MFR BLD ELPH: 3.1 % (ref 2–3.3)
HGB F MFR BLD ELPH: 0.6 % (ref 0–0.9)
HGB FRACT BLD ELPH-IMP: ABNORMAL
HGB FRACT BLD ELPH-IMP: NORMAL
HGB S BLD QL SOLY: POSITIVE
HGB XXX MFR BLD ELPH: ABNORMAL %
M HPLC HB VARIANT, B: ABNORMAL
PROVIDER SIGNING NAME: NORMAL

## 2024-09-03 RX ORDER — CIPROFLOXACIN 500 MG/1
500 TABLET ORAL 2 TIMES DAILY
Qty: 14 TABLET | Refills: 0 | Status: SHIPPED | OUTPATIENT
Start: 2024-09-03 | End: 2024-09-04

## 2024-09-03 NOTE — TELEPHONE ENCOUNTER
I spoke with patients daughter, Jennifer Adamson, and informed her that patient has a UTI and Ms. Simmons is sending a ABX to her pharmacy that she is to complete. Patient is to increase water intake, no feminine products, bath bombs, scented soaps, avoid carbonated  drinks, urinate frequently, do not hold urine. Instructed to contact clinic of symptoms worsen, signs of nausea/vomiting or burning with urination once completed with ABX treatment. Daughter verbalized understanding, noted.

## 2024-09-03 NOTE — PROGRESS NOTES
Please inform Leatha Martinez of the following:    She has a UTI. I will send in an antiobiotic. Please take as prescribed.    UTI education:  Start antibiotics as prescribed.    Complete the full course of the medication.   Report any continuing signs such as nausea/vomiting, visible blood in urine, increased low back or flank pain, worsening burning upon urination after antibiotic completion or fever.   Drink plenty of water.   Avoid soda or carbonated beverages.    Urinate frequently; do not hold urine for extended periods of time.   Wear cotton underwear, avoid tight fitting pants.   Use OTC AZO or pyridium for relief of urinary spasms.   Women: wipe front to back, urinate after sexual intercourse, and avoid scented or irritating feminine products, bubble baths, body washes, bath bombs, and soaps.      Thank you,    Caroline Simmons, MAIRAC

## 2024-09-03 NOTE — TELEPHONE ENCOUNTER
----- Message from RENU Mendiola sent at 9/3/2024  6:54 AM CDT -----  Please inform Leatha Martienz of the following:    She has a UTI. I will send in an antiobiotic. Please take as prescribed.    UTI education:  Start antibiotics as prescribed.    Complete the full course of the medication.   Report any continuing signs such as nausea/vomiting, visible blood in urine, increased low back or flank pain, worsening burning upon urination after antibiotic completion or fever.   Drink plenty of water.   Avoid soda or carbonated beverages.    Urinate frequently; do not hold urine for extended periods of time.   Wear cotton underwear, avoid tight fitting pants.   Use OTC AZO or pyridium for relief of urinary spasms.   Women: wipe front to back, urinate after sexual intercourse, and avoid scented or irritating feminine products, bubble baths, body washes, bath bombs, and soaps.      Thank you,    JOHN Grady

## 2024-09-04 ENCOUNTER — OFFICE VISIT (OUTPATIENT)
Facility: CLINIC | Age: 69
End: 2024-09-04
Payer: MEDICARE

## 2024-09-04 VITALS
HEIGHT: 66 IN | BODY MASS INDEX: 41.14 KG/M2 | WEIGHT: 256 LBS | HEART RATE: 85 BPM | DIASTOLIC BLOOD PRESSURE: 58 MMHG | SYSTOLIC BLOOD PRESSURE: 135 MMHG

## 2024-09-04 DIAGNOSIS — M51.36 DDD (DEGENERATIVE DISC DISEASE), LUMBAR: ICD-10-CM

## 2024-09-04 DIAGNOSIS — M54.16 LUMBAR RADICULOPATHY: ICD-10-CM

## 2024-09-04 DIAGNOSIS — M54.16 LUMBAR NERVE ROOT IMPINGEMENT: Primary | ICD-10-CM

## 2024-09-04 PROCEDURE — 3075F SYST BP GE 130 - 139MM HG: CPT | Mod: CPTII,,, | Performed by: NURSE PRACTITIONER

## 2024-09-04 PROCEDURE — 3008F BODY MASS INDEX DOCD: CPT | Mod: CPTII,,, | Performed by: NURSE PRACTITIONER

## 2024-09-04 PROCEDURE — 1100F PTFALLS ASSESS-DOCD GE2>/YR: CPT | Mod: CPTII,,, | Performed by: NURSE PRACTITIONER

## 2024-09-04 PROCEDURE — 1125F AMNT PAIN NOTED PAIN PRSNT: CPT | Mod: CPTII,,, | Performed by: NURSE PRACTITIONER

## 2024-09-04 PROCEDURE — 1160F RVW MEDS BY RX/DR IN RCRD: CPT | Mod: CPTII,,, | Performed by: NURSE PRACTITIONER

## 2024-09-04 PROCEDURE — 99214 OFFICE O/P EST MOD 30 MIN: CPT | Mod: ,,, | Performed by: NURSE PRACTITIONER

## 2024-09-04 PROCEDURE — 3078F DIAST BP <80 MM HG: CPT | Mod: CPTII,,, | Performed by: NURSE PRACTITIONER

## 2024-09-04 PROCEDURE — 3066F NEPHROPATHY DOC TX: CPT | Mod: CPTII,,, | Performed by: NURSE PRACTITIONER

## 2024-09-04 PROCEDURE — 3060F POS MICROALBUMINURIA REV: CPT | Mod: CPTII,,, | Performed by: NURSE PRACTITIONER

## 2024-09-04 PROCEDURE — 1159F MED LIST DOCD IN RCRD: CPT | Mod: CPTII,,, | Performed by: NURSE PRACTITIONER

## 2024-09-04 PROCEDURE — 3288F FALL RISK ASSESSMENT DOCD: CPT | Mod: CPTII,,, | Performed by: NURSE PRACTITIONER

## 2024-09-04 RX ORDER — GABAPENTIN 300 MG/1
1 CAPSULE ORAL
COMMUNITY

## 2024-09-04 RX ORDER — NITROFURANTOIN MACROCRYSTALS 50 MG/1
CAPSULE ORAL
COMMUNITY

## 2024-09-04 RX ORDER — DULOXETIN HYDROCHLORIDE 20 MG/1
20 CAPSULE, DELAYED RELEASE ORAL 2 TIMES DAILY
Qty: 60 CAPSULE | Refills: 11 | Status: SHIPPED | OUTPATIENT
Start: 2024-09-04 | End: 2025-09-04

## 2024-09-04 RX ORDER — SIMVASTATIN 20 MG/1
TABLET, FILM COATED ORAL
COMMUNITY
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
## 2024-09-06 ENCOUNTER — TELEPHONE (OUTPATIENT)
Dept: INTERNAL MEDICINE | Facility: CLINIC | Age: 69
End: 2024-09-06

## 2024-09-06 NOTE — TELEPHONE ENCOUNTER
----- Message from Jackelin Harris sent at 9/6/2024 11:43 AM CDT -----  Regarding: medical transportation  Who Called: Leatha Michelle    Caller is requesting assistance/information from provider's office.    Symptoms (please be specific):      How long has patient had these symptoms:      List of preferred pharmacies on file (remove unneeded): [unfilled]  If different, enter pharmacy into here including location and phone number:         Preferred Method of Contact: Phone Call  Patient's Preferred Phone Number on File: Jennifer (Daughter)  [phone] (Mobile)     Best Call Back Number, if different:  Additional Information: Pt's daughter is asking how does medical transportation works; she wants to know if the provider has to fill out any forms, etc; please advise.

## 2024-09-06 NOTE — TELEPHONE ENCOUNTER
Returned call to pts daughter and informed her she'll need to contact the number on back of pts insurance card. Daughter voiced understanding. Pts daughter inquired about DMV form and stated she'll pick it up at pts visit. Letter sent to new PCP.

## 2024-09-06 NOTE — LETTER
I certify that (Name) Leatha Martinez meets the requirements as outlined in #  (shown on reverse side) and qualifies for a mobility impaired license plate/hang-tag. I further understand that willful and false certification shall subject me to fines/imprisonment as outlined in R.S. 47:463.4 (G) (4). The applicant's information is as follows:    YOB: 1955            Race:Black or         Gender:Female    Address:  [facility]  [address]    City:[place]                               State:[place]     Zip Code:[address]     []Permanently Impaired - Applicant has a total or lifelong condition of mobility impairment from which little or no improvement or recovery can reasonably be expected. A medical examiners certification is required on initial application only.      [] Temporarily Impaired - Applicant has a temporary condition of mobility impairment of which improvement or recovery can reasonably be expected. Applicant is entitled to a hangtag, which will be valid for one (1) year. A medical examiners certification is required for the renewal of the hangtag      [] Unable to appear in person at the Office of Motor Vehicles - Applicant must bring facial photo        Medical Examiner's Signature________________________________________ Date:9/6/24_________________________    Printed Name:___________________________________________________    State License #_____________________________    Address: [facility] [address] [facility]                                     Phone Number: [phone]                                                                                                                                                                                                                  City: [place]__________________________________ State: [place] __Zip Code: [address] _______________    TO BE COMPLETED BY MOTOR VEHICLE ANALYST ONLY    ERUM   Lic. Plate #      Hangtag Control #    Yahaira ID #      Date Issued:    #:   Office #:

## 2024-09-10 ENCOUNTER — OFFICE VISIT (OUTPATIENT)
Dept: INTERNAL MEDICINE | Facility: CLINIC | Age: 69
End: 2024-09-10
Payer: MEDICARE

## 2024-09-10 ENCOUNTER — TELEPHONE (OUTPATIENT)
Dept: INTERNAL MEDICINE | Facility: CLINIC | Age: 69
End: 2024-09-10

## 2024-09-10 VITALS
DIASTOLIC BLOOD PRESSURE: 69 MMHG | SYSTOLIC BLOOD PRESSURE: 114 MMHG | RESPIRATION RATE: 18 BRPM | BODY MASS INDEX: 39.53 KG/M2 | TEMPERATURE: 98 F | WEIGHT: 246 LBS | HEART RATE: 72 BPM | HEIGHT: 66 IN

## 2024-09-10 DIAGNOSIS — Z12.31 ENCOUNTER FOR SCREENING MAMMOGRAM FOR MALIGNANT NEOPLASM OF BREAST: ICD-10-CM

## 2024-09-10 DIAGNOSIS — D50.8 OTHER IRON DEFICIENCY ANEMIA: Chronic | ICD-10-CM

## 2024-09-10 DIAGNOSIS — E66.01 CLASS 3 SEVERE OBESITY DUE TO EXCESS CALORIES WITH SERIOUS COMORBIDITY AND BODY MASS INDEX (BMI) OF 40.0 TO 44.9 IN ADULT: Chronic | ICD-10-CM

## 2024-09-10 DIAGNOSIS — M17.0 PRIMARY OSTEOARTHRITIS OF BOTH KNEES: ICD-10-CM

## 2024-09-10 DIAGNOSIS — Z86.718 PERSONAL HISTORY OF OTHER VENOUS THROMBOSIS AND EMBOLISM: Chronic | ICD-10-CM

## 2024-09-10 DIAGNOSIS — R54 FRAILTY: Chronic | ICD-10-CM

## 2024-09-10 DIAGNOSIS — M54.41 CHRONIC LEFT-SIDED LOW BACK PAIN WITH BILATERAL SCIATICA: ICD-10-CM

## 2024-09-10 DIAGNOSIS — M54.42 CHRONIC LEFT-SIDED LOW BACK PAIN WITH BILATERAL SCIATICA: ICD-10-CM

## 2024-09-10 DIAGNOSIS — G89.29 CHRONIC LEFT-SIDED LOW BACK PAIN WITH BILATERAL SCIATICA: ICD-10-CM

## 2024-09-10 DIAGNOSIS — Z98.890 H/O LEFT BREAST BIOPSY: ICD-10-CM

## 2024-09-10 DIAGNOSIS — I10 PRIMARY HYPERTENSION: Primary | Chronic | ICD-10-CM

## 2024-09-10 DIAGNOSIS — E78.49 OTHER HYPERLIPIDEMIA: Chronic | ICD-10-CM

## 2024-09-10 DIAGNOSIS — Z00.00 WELL ADULT EXAM: ICD-10-CM

## 2024-09-10 DIAGNOSIS — R06.02 SOB (SHORTNESS OF BREATH): ICD-10-CM

## 2024-09-10 DIAGNOSIS — E55.9 VITAMIN D DEFICIENCY: ICD-10-CM

## 2024-09-10 PROBLEM — M17.9 OA (OSTEOARTHRITIS) OF KNEE: Status: ACTIVE | Noted: 2024-09-10

## 2024-09-10 PROBLEM — M54.9 CHRONIC BACK PAIN: Status: ACTIVE | Noted: 2024-09-10

## 2024-09-10 PROCEDURE — 1160F RVW MEDS BY RX/DR IN RCRD: CPT | Mod: CPTII,,, | Performed by: NURSE PRACTITIONER

## 2024-09-10 PROCEDURE — 3008F BODY MASS INDEX DOCD: CPT | Mod: CPTII,,, | Performed by: NURSE PRACTITIONER

## 2024-09-10 PROCEDURE — 3074F SYST BP LT 130 MM HG: CPT | Mod: CPTII,,, | Performed by: NURSE PRACTITIONER

## 2024-09-10 PROCEDURE — 99214 OFFICE O/P EST MOD 30 MIN: CPT | Mod: S$PBB,,, | Performed by: NURSE PRACTITIONER

## 2024-09-10 PROCEDURE — 3078F DIAST BP <80 MM HG: CPT | Mod: CPTII,,, | Performed by: NURSE PRACTITIONER

## 2024-09-10 PROCEDURE — 3066F NEPHROPATHY DOC TX: CPT | Mod: CPTII,,, | Performed by: NURSE PRACTITIONER

## 2024-09-10 PROCEDURE — 1125F AMNT PAIN NOTED PAIN PRSNT: CPT | Mod: CPTII,,, | Performed by: NURSE PRACTITIONER

## 2024-09-10 PROCEDURE — 3288F FALL RISK ASSESSMENT DOCD: CPT | Mod: CPTII,,, | Performed by: NURSE PRACTITIONER

## 2024-09-10 PROCEDURE — 1101F PT FALLS ASSESS-DOCD LE1/YR: CPT | Mod: CPTII,,, | Performed by: NURSE PRACTITIONER

## 2024-09-10 PROCEDURE — 3060F POS MICROALBUMINURIA REV: CPT | Mod: CPTII,,, | Performed by: NURSE PRACTITIONER

## 2024-09-10 PROCEDURE — 1159F MED LIST DOCD IN RCRD: CPT | Mod: CPTII,,, | Performed by: NURSE PRACTITIONER

## 2024-09-10 PROCEDURE — 99215 OFFICE O/P EST HI 40 MIN: CPT | Mod: PBBFAC | Performed by: NURSE PRACTITIONER

## 2024-09-10 RX ORDER — ALBUTEROL SULFATE 0.83 MG/ML
2.5 SOLUTION RESPIRATORY (INHALATION) EVERY 6 HOURS PRN
Qty: 100 EACH | Refills: 6 | Status: SHIPPED | OUTPATIENT
Start: 2024-09-10 | End: 2025-09-10

## 2024-09-10 NOTE — TELEPHONE ENCOUNTER
Spoke to pts daughter. She stated they misplaced the form because she never took the pt to get tag. Pts daughter requesting new form. Please advise.

## 2024-09-10 NOTE — TELEPHONE ENCOUNTER
Called pt's daughter, Jennifer and ELHAM informing pts daughter of PCP's message below. Informed pts daughter if they have any questions or concerns to please reach out to us.

## 2024-09-10 NOTE — PROGRESS NOTES
Patient ID: [id number]     Chief Complaint: Establish Care (Rt shoulder pain)        HPI:     VAN Martinez is a 69 y.o. female here today for a follow up. Pt followed in Cardio cl here at [facility]. Pt followed in Uro , Ortho, ENT, Pain mgmt with Dr Frank and Dr Lang Teixeira for thyroid issues.         Immunizations:   Immunization History   Administered Date(s) Administered    Influenza (FLUAD) - Quadrivalent - Adjuvanted - PF *Preferred* (65+) 10/04/2022, 09/26/2023    Tdap 05/22/2017        -------------------------------------    Asthma    Digestive disorder    Hyperlipidemia    Hypertension    Kidney hematoma    blood clot on left    Mass of left breast on mammogram    Personal history of colonic polyps        Past Surgical History:   Procedure Laterality Date    COLONOSCOPY  04/14/2022    TONSILLECTOMY      TRANSFORAMINAL EPIDURAL INJECTION OF STEROID Bilateral 8/21/2024    Procedure: Injection,steroid,epidural,transforaminal approach;  Surgeon: Yeny Adams MD;  Location: [facility];  Service: Pain Management;  Laterality: Bilateral;  Bilateral TFESI L3       Review of patient's allergies indicates:   Allergen Reactions    Penicillins Hives    Aspirin Rash    Keflex [cephalexin] Rash    Tramadol Rash       Current Outpatient Medications   Medication Instructions    albuterol (PROVENTIL) 2.5 mg, Nebulization, Every 6 hours PRN, Rescue    albuterol (VENTOLIN HFA) 90 mcg/actuation inhaler 2 puffs, Inhalation, Every 6 hours PRN, Rescue    amLODIPine (NORVASC) 10 mg, Oral, Daily    DULoxetine (CYMBALTA) 20 mg, Oral, 2 times daily    ergocalciferol (ERGOCALCIFEROL) 50,000 Units, Oral, Every 7 days    ferrous gluconate (FERGON) 324 mg, Oral, With breakfast    furosemide (LASIX) 20 mg, Oral, Daily    gabapentin (NEURONTIN) 300 MG capsule 1 capsule    methIMAzole (TAPAZOLE) 5 mg, Oral, 2 times daily    nitrofurantoin (MACRODANTIN) 50 MG capsule 1 capsule at bedtime with food or milk Orally Once a day     pantoprazole (PROTONIX) 40 mg, Oral, Daily    rivaroxaban (XARELTO) 20 mg, Oral, With dinner    simvastatin (ZOCOR) 20 MG tablet 1 tablet in the evening Orally Once a day for 30 day(s)    walker Misc 1 each, Misc.(Non-Drug; Combo Route), Daily, Please assist pt with obtaining Rollator to assist with mobility.       Social History     Socioeconomic History    Marital status: Single    Number of children: 3   Tobacco Use    Smoking status: Never     Passive exposure: Current    Smokeless tobacco: Never   Substance and Sexual Activity    Alcohol use: Never    Drug use: Never    Sexual activity: Yes     Partners: Male     Social Determinants of Health     Financial Resource Strain: Medium Risk (9/9/2024)    Overall Financial Resource Strain (CARDIA)     Difficulty of Paying Living Expenses: Somewhat hard   Food Insecurity: Food Insecurity Present (9/9/2024)    Hunger Vital Sign     Worried About Running Out of Food in the Last Year: Sometimes true     Ran Out of Food in the Last Year: Sometimes true   Transportation Needs: Unmet Transportation Needs (4/4/2023)    PRAPARE - Transportation     Lack of Transportation (Medical): Yes     Lack of Transportation (Non-Medical): Yes   Physical Activity: Inactive (9/10/2024)    Exercise Vital Sign     Days of Exercise per Week: 0 days     Minutes of Exercise per Session: 0 min   Stress: No Stress Concern Present (9/9/2024)    [employer] - Occupational Stress Questionnaire     Feeling of Stress : Only a little   Housing Stability: High Risk (9/9/2024)    Housing Stability Vital Sign     Unable to Pay for Housing in the Last Year: Yes        Family History   Problem Relation Name Age of Onset    Heart attack Mother      Thyroid disease Mother      Diabetes Father      Thyroid disease Father      Heart attack Father          Patient Care Team:  Tiffany Harris FNP as PCP - General (Family Medicine)     Subjective:     Review of Systems     See HPI for  "details    Constitutional: Denies Change in appetite. Denies Chills. Denies Fever. Denies Night sweats.  Eye: Denies Blurred vision. Denies Discharge. Denies Eye pain.  ENT: Denies Decreased hearing. Denies Sore throat. Denies Swollen glands.  Respiratory: Denies Cough. Denies Shortness of breath. Denies Shortness of breath with exertion. Denies Wheezing.  Cardiovascular: DeniesChest pain at rest. Denies Chest pain with exertion. Denies Irregular heartbeat. Denies Palpitations. Denies Edema.  Gastrointestinal: Denies Abdominal pain. DeniesDiarrhea. Denies Nausea. Denies Vomiting. Denies Hematemesis or Hematochezia.  Genitourinary: Denies Dysuria. Denies Urinary frequency. Denies Urinary urgency. Denies Blood in urine.  Endocrine: Denies Cold intolerance. Denies Excessive thirst. Denies Heat intolerance. Denies Weight loss. Denies Weight gain.  Musculoskeletal: Denies Painful joints. Denies Weakness.  Integumentary: Denies Rash. Denies Itching. Denies Dry skin.  Neurologic: Denies Dizziness. Denies Fainting. Denies Headache.  Psychiatric: Denies Depression. Denies Anxiety. Denies Suicidal/Homicidal ideations.    All Other ROS: Negative except as stated in HPI.       Objective:     Visit Vitals  /69 (BP Location: Right arm, Patient Position: Sitting, BP Method: Large (Automatic))   Pulse 72   Temp 98.3 °F (36.8 °C) (Oral)   Resp 18   Ht 5' 6" (1.676 m)   Wt 111.6 kg (246 lb)   BMI 39.71 kg/m²       Physical Exam    General: Alert and oriented, No acute distress.  Head: Normocephalic, Atraumatic.  Eye: Pupils are equal, round and reactive to light, Extraocular movements are intact, Sclera non-icteric.  Ears/Nose/Throat: Normal, Mucosa moist,Clear.  Neck/Thyroid: Supple, Non-tender, No carotid bruit, No lymphadenopathy, No JVD, Full range of motion.  Respiratory: Clear to auscultation bilaterally; No wheezes, rales or rhonchi,Non-labored respirations, Symmetrical chest wall expansion.  Cardiovascular: Regular rate " and rhythm, S1/S2 normal, No murmurs, rubs or gallops.  Gastrointestinal: Soft, Non-tender, Non-distended, Normal bowel sounds, No palpable organomegaly.  Musculoskeletal: Normal range of motion.  Integumentary: Warm, Dry, Intact, No suspicious lesions or rashes.  Extremities: No clubbing, cyanosis or edema  Neurologic: No focal deficits, Cranial Nerves II-XII are grossly intact, Motor strength normal upper and lower extremities, Sensory exam intact.  Psychiatric: Normal interaction, Coherent speech, Euthymic mood, Appropriate affect       Labs Reviewed:     Chemistry:  Lab Results   Component Value Date     04/14/2024    K 4.3 04/14/2024    BUN 17.5 04/14/2024    CREATININE 0.81 04/14/2024    EGFRNORACEVR >60 04/14/2024    GLUCOSE 93 04/14/2024    CALCIUM 8.7 04/14/2024    ALKPHOS 136 04/14/2024    LABPROT 7.5 04/14/2024    ALBUMIN 3.2 (L) 04/14/2024    BILIDIR 0.2 03/15/2022    IBILI 0.10 03/15/2022    AST 15 04/14/2024    ALT 11 04/14/2024    MG 1.90 07/29/2023    PHOS 3.5 08/22/2021    GARPGHOH07JT 12.1 (L) 10/04/2022        Lab Results   Component Value Date    HGBA1C 6.3 10/04/2022        Hematology:  Lab Results   Component Value Date    WBC 8.90 05/07/2024    HGB 9.0 (L) 05/07/2024    HCT 29.9 (L) 05/07/2024     05/07/2024       Lipid Panel:  Lab Results   Component Value Date    CHOL 154 03/12/2024    HDL 34 (L) 03/12/2024    .00 03/12/2024    TRIG 84 03/12/2024    TOTALCHOLEST 5 03/12/2024        Urine:  Lab Results   Component Value Date    APPEARANCEUA Clear 08/29/2024    APPEARANCEUA Hazy (A) 08/29/2024    SGUA 1.012 08/29/2024    SGUA 1.012 08/29/2024    PROTEINUA Negative 08/29/2024    PROTEINUA Negative 08/29/2024    KETONESUA Negative 08/29/2024    KETONESUA Negative 08/29/2024    LEUKOCYTESUR 500 (A) 08/29/2024    LEUKOCYTESUR 500 (A) 08/29/2024    RBCUA 0-5 08/29/2024    RBCUA 0-5 08/29/2024    WBCUA 51-99 (A) 08/29/2024    WBCUA 51-99 (A) 08/29/2024    BACTERIA Many (A)  08/29/2024    BACTERIA Many (A) 08/29/2024    SQEPUA Occ (A) 08/29/2024    SQEPUA Few (A) 08/29/2024    HYALINECASTS None Seen 08/29/2024    HYALINECASTS None Seen 08/29/2024    DARLIN 71.4 08/29/2024        Assessment:       ICD-10-CM ICD-9-CM   1. Primary hypertension  I10 401.9   2. Other hyperlipidemia  E78.49 272.4   3. Personal history of other venous thrombosis and embolism  Z86.718 V12.51   4. Class 3 severe obesity due to excess calories with serious comorbidity and body mass index (BMI) of 40.0 to 44.9 in adult  E66.01 278.01    Z68.41 V85.41   5. Well adult exam  Z00.00 V70.0   6. Other iron deficiency anemia  D50.8 280.8   7. H/O left breast biopsy  Z98.890 V45.89   8. Vitamin D deficiency  E55.9 268.9   9. Encounter for screening mammogram for malignant neoplasm of breast  Z12.31 V76.12   10. Frailty  R54 797   11. Chronic left-sided low back pain with bilateral sciatica  M54.41 724.2    M54.42 724.3    G89.29 338.29   12. Primary osteoarthritis of both knees  M17.0 715.16   13. SOB (shortness of breath)  R06.02 786.05        Plan:     1. Primary hypertension  BP controlled. Low fat low salt diet and exercise. Cont Amlodipine as prescribed.     2. Other hyperlipidemia  Low fat diet and exercise. Cont Simvastatin as prescribed.      3. Personal history of other venous thrombosis and embolism  Cont Xeralto as prescribed. Pt managed in Cardio cl. Pt states was informed she would need life long therapy. Keep Cardio cl appt.     4. Class 3 severe obesity due to excess calories with serious comorbidity and body mass index (BMI) of 40.0 to 44.9 in adult  Encouraged low fat diet and exercise. Education provided.     5. Well adult exam  Labs in 1 month. UTD DEXA - WNL- next due (9/2025).     6. Other iron deficiency anemia  Pt referred per Tiffany Harris for Iron infusions. CBC iron level in 1 month.     7. H/O left breast biopsy  Pt s/p left breast biopsy done 5-31-24 showing:  Specimen to Pathology Breast:  "[id number]  Order: [id number]  Status: Final result       Visible to patient: Yes (seen)       Next appt: 10/02/2024 at 09:30 AM in Pain Medicine (Byron Ledesma NP)       Dx: Mass of upper inner quadrant of left ...    0 Result Notes      Component    Case Report   [facility] Surgical Pathology                            Case: [id number]                                 Authorizing Provider:  Jaxon Walter MD     Collected:           05/30/2024 02:33 PM           Ordering Location:     [facility] -       Received:            05/30/2024 03:20 PM                                  Mammography                                                                   Pathologist:           Emma Mott MD                                                         Specimen:    Breast, Left, Left upper inner breast 6 x 5 x 6 mm mass at 11:00, 4 cmfn                  Clinical Information    69-year-old woman presents for ultrasound-guided core biopsy of the left breast.   Final Diagnosis      Left upper inner breast 6 x 5 x 6 mm mass at 11:00, 4 cmfn, needle biopsy:      - Benign breast with sclerosing fibrosis, usual ductal hyperplasia, and dilated ducts.  - No evidence of malignancy.       Electronically signed by Emma Mott MD on 5/31/2024 at 1520   Gross Description    1. Breast, Left: Left upper inner breast 6 x 5 x 6 mm mass at 11:00, 4 cmfn  Received in 10% buffered neutral formalin and fixed for 8.5 hours are multiple cores of tan soft tissue ranging in length from 6-12 mm. Entirely submitted.   Report Footnotes    Unless the case is a "gross only" or additional testing only, the final diagnosis for each specimen is based on a microscopic examination of appropriate tissue sections.   Resulting Agency [facility]              Specimen Collected: 05/30/24 14:33 CDT Last Resulted: 05/31/24 15:20 CDT          Next MMG due 5/2025.     8. Vitamin D deficiency  Vit D level in 1 month.      9. Chronic back " pain  Pt followed by Dr Yeny Frank pain management for chronic back pain. Pt requesting RX for rollator to assist with ambulation. RX for rollator printed and will fax to [facility] in [place].    10. OA knees   RX for rollator printed and will fax to [facility] in [place] for assistance with obtaining rollator.     11. SOB  RX Albuterol neb solution per nebulizer as prescribed prn SOB.       Follow up in about 1 month (around 10/10/2024) for with labs 1 week prior to appt. . In addition to their scheduled follow up, the patient has also been instructed to follow up on as needed basis.     Future Appointments   Date Time Provider Department Center   10/2/2024  9:30 AM Byron Ledesma NP [facility]   11/7/2024  8:00 AM Shauna Samson FNP [facility]   2/3/2025  9:45 AM DaAna Cristina keyes, EARLINE [facility] CARD [place]        RENU Iglesias

## 2024-09-12 ENCOUNTER — PATIENT MESSAGE (OUTPATIENT)
Dept: INTERNAL MEDICINE | Facility: CLINIC | Age: 69
End: 2024-09-12
Payer: MEDICARE

## 2024-09-19 ENCOUNTER — TELEPHONE (OUTPATIENT)
Dept: INTERNAL MEDICINE | Facility: CLINIC | Age: 69
End: 2024-09-19
Payer: MEDICARE

## 2024-09-19 NOTE — TELEPHONE ENCOUNTER
----- Message from Annalee Gabriel sent at 9/10/2024 12:40 PM CDT -----  Who Called: Leatha Martinez    Pharmacy is calling to request assistance with Rx    Pharmacy name and phone number: [facility] [phone]  Pharmacy contact: hodan  Patient Name: leatha  Prescription Name:  walker   What do they need to clarify?: 15 page fax request received  for walker, do not handle walkers nor does pt gets Rx from their pharmacy        Preferred Method of Contact: Phone Call  Patient's Preferred Phone Number on File: [phone]   Best Call Back Number, if different:  Additional Information:  pharmacy call, please advise, thanks

## 2024-09-19 NOTE — TELEPHONE ENCOUNTER
Returned call spoke to Jennifer ( daughter). She informed me that [facility] does not accept her mother's insurance for the walker. She said that she has made several calls to trying to find a supply store . She said that she may have to come out of pocket .

## 2024-09-20 ENCOUNTER — PATIENT MESSAGE (OUTPATIENT)
Dept: INTERNAL MEDICINE | Facility: CLINIC | Age: 69
End: 2024-09-20
Payer: MEDICARE

## 2024-09-30 DIAGNOSIS — M17.10 PRIMARY OSTEOARTHRITIS OF KNEE, UNSPECIFIED LATERALITY: Primary | ICD-10-CM

## 2024-10-02 ENCOUNTER — OFFICE VISIT (OUTPATIENT)
Facility: CLINIC | Age: 69
End: 2024-10-02
Payer: MEDICARE

## 2024-10-02 VITALS
BODY MASS INDEX: 39.53 KG/M2 | HEART RATE: 62 BPM | WEIGHT: 246 LBS | HEIGHT: 66 IN | DIASTOLIC BLOOD PRESSURE: 60 MMHG | SYSTOLIC BLOOD PRESSURE: 130 MMHG

## 2024-10-02 DIAGNOSIS — M51.369 DEGENERATION OF INTERVERTEBRAL DISC OF LUMBAR REGION WITHOUT DISCOGENIC BACK PAIN OR LOWER EXTREMITY PAIN: ICD-10-CM

## 2024-10-02 DIAGNOSIS — M54.16 LUMBAR NERVE ROOT IMPINGEMENT: Primary | ICD-10-CM

## 2024-10-02 DIAGNOSIS — R29.898 LEG WEAKNESS, BILATERAL: ICD-10-CM

## 2024-10-02 DIAGNOSIS — M54.16 LUMBAR RADICULOPATHY: ICD-10-CM

## 2024-10-02 PROCEDURE — 3066F NEPHROPATHY DOC TX: CPT | Mod: CPTII,,, | Performed by: NURSE PRACTITIONER

## 2024-10-02 PROCEDURE — 1159F MED LIST DOCD IN RCRD: CPT | Mod: CPTII,,, | Performed by: NURSE PRACTITIONER

## 2024-10-02 PROCEDURE — 99214 OFFICE O/P EST MOD 30 MIN: CPT | Mod: 25,,, | Performed by: NURSE PRACTITIONER

## 2024-10-02 PROCEDURE — 3075F SYST BP GE 130 - 139MM HG: CPT | Mod: CPTII,,, | Performed by: NURSE PRACTITIONER

## 2024-10-02 PROCEDURE — 3078F DIAST BP <80 MM HG: CPT | Mod: CPTII,,, | Performed by: NURSE PRACTITIONER

## 2024-10-02 PROCEDURE — 1160F RVW MEDS BY RX/DR IN RCRD: CPT | Mod: CPTII,,, | Performed by: NURSE PRACTITIONER

## 2024-10-02 PROCEDURE — 1101F PT FALLS ASSESS-DOCD LE1/YR: CPT | Mod: CPTII,,, | Performed by: NURSE PRACTITIONER

## 2024-10-02 PROCEDURE — 3008F BODY MASS INDEX DOCD: CPT | Mod: CPTII,,, | Performed by: NURSE PRACTITIONER

## 2024-10-02 PROCEDURE — 96372 THER/PROPH/DIAG INJ SC/IM: CPT | Mod: ,,, | Performed by: NURSE PRACTITIONER

## 2024-10-02 PROCEDURE — 1125F AMNT PAIN NOTED PAIN PRSNT: CPT | Mod: CPTII,,, | Performed by: NURSE PRACTITIONER

## 2024-10-02 PROCEDURE — 3060F POS MICROALBUMINURIA REV: CPT | Mod: CPTII,,, | Performed by: NURSE PRACTITIONER

## 2024-10-02 PROCEDURE — 3288F FALL RISK ASSESSMENT DOCD: CPT | Mod: CPTII,,, | Performed by: NURSE PRACTITIONER

## 2024-10-02 RX ORDER — METHYLPREDNISOLONE ACETATE 80 MG/ML
80 INJECTION, SUSPENSION INTRA-ARTICULAR; INTRALESIONAL; INTRAMUSCULAR; SOFT TISSUE
Status: COMPLETED | OUTPATIENT
Start: 2024-10-02 | End: 2024-10-02

## 2024-10-02 RX ORDER — DULOXETIN HYDROCHLORIDE 30 MG/1
30 CAPSULE, DELAYED RELEASE ORAL DAILY
Qty: 30 CAPSULE | Refills: 11 | Status: SHIPPED | OUTPATIENT
Start: 2024-10-02 | End: 2025-10-02

## 2024-10-02 RX ADMIN — METHYLPREDNISOLONE ACETATE 80 MG: 80 INJECTION, SUSPENSION INTRA-ARTICULAR; INTRALESIONAL; INTRAMUSCULAR; SOFT TISSUE at 04:10
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
## 2024-10-04 ENCOUNTER — OFFICE VISIT (OUTPATIENT)
Dept: GYNECOLOGY | Facility: CLINIC | Age: 69
End: 2024-10-04
Payer: MEDICARE

## 2024-10-04 VITALS
HEART RATE: 64 BPM | RESPIRATION RATE: 20 BRPM | OXYGEN SATURATION: 97 % | BODY MASS INDEX: 41.78 KG/M2 | WEIGHT: 260 LBS | HEIGHT: 66 IN | DIASTOLIC BLOOD PRESSURE: 72 MMHG | TEMPERATURE: 99 F | SYSTOLIC BLOOD PRESSURE: 132 MMHG

## 2024-10-04 DIAGNOSIS — E66.813 CLASS 3 SEVERE OBESITY WITH BODY MASS INDEX (BMI) OF 40.0 TO 44.9 IN ADULT, UNSPECIFIED OBESITY TYPE, UNSPECIFIED WHETHER SERIOUS COMORBIDITY PRESENT: ICD-10-CM

## 2024-10-04 DIAGNOSIS — Z12.4 PAP SMEAR FOR CERVICAL CANCER SCREENING: Primary | ICD-10-CM

## 2024-10-04 DIAGNOSIS — E66.01 CLASS 3 SEVERE OBESITY WITH BODY MASS INDEX (BMI) OF 40.0 TO 44.9 IN ADULT, UNSPECIFIED OBESITY TYPE, UNSPECIFIED WHETHER SERIOUS COMORBIDITY PRESENT: ICD-10-CM

## 2024-10-04 PROCEDURE — 99214 OFFICE O/P EST MOD 30 MIN: CPT | Mod: PBBFAC | Performed by: NURSE PRACTITIONER

## 2024-10-04 NOTE — PROGRESS NOTES
"  [facility] -  Gynecology / Women's Health Clinic      Subjective:       Patient ID: Leatha Martinez is a 69 y.o. female.    Chief Complaint:  Gynecologic Exam    History of Present Illness  The patient  here for annual exam. Pt is postmenopausal late 50s. Pt states she has been seen at [facility] GYN clinic in the past, however no documentation in chart. No previous paps on file. Denies history of abnormal paps. MG-24-Pt had LT breast bx-Benign breast with sclerosing fibrosis, usual ductal hyperplasia, and dilated ducts. No evidence of malignancy. Denies breast or urinary complaints. Pt is followed urology. Denies pelvic pain, abnormal bleeding or discharge. Pt reports no STIs in the past and no concerns. Denies tobacco use. Dep. screening 0. Cologuard neg in . DEXA in -WNL. Denies fly hx of breast, ovarian, uterine or colon cancer.     GYN & OB History  No LMP recorded. Patient is postmenopausal.     Review of patient's allergies indicates:   Allergen Reactions    Penicillins Hives    Aspirin Rash    Keflex [cephalexin] Rash    Tramadol Rash     Past Medical History:   Diagnosis Date    Asthma     Digestive disorder     Hyperlipidemia     Hypertension     Kidney hematoma     blood clot on left    Mass of left breast on mammogram     Personal history of colonic polyps 2022     OB History    Para Term  AB Living   3 3           SAB IAB Ectopic Multiple Live Births                  # Outcome Date GA Lbr Yordan/2nd Weight Sex Type Anes PTL Lv   3 Para            2 Para            1 Para                 Review of Systems  Review of Systems    Negative except for pertinent findings for positives per HPI     Objective:    Physical Exam    /72 (BP Location: Left arm, Patient Position: Sitting)   Pulse 64   Temp 98.9 °F (37.2 °C) (Oral)   Resp 20   Ht 5' 6" (1.676 m)   Wt 117.9 kg (260 lb)   SpO2 97%   BMI 41.97 kg/m²   GENERAL: Well-developed female. No acute " distress.    SKIN: Normal to inspection, warm and intact.  BREASTS: No rashes or erythema. No masses, lumps, discharge, tenderness.  VULVA: General appearance normal; external genitalia with no lesions or erythema.  VAGINA: Mucosa/vaginal vault atrophic, no abnormal discharge or lesions.  CERVIX: atrophic, parous appearing os, no erythema or abnormal discharge.  BIMANUAL EXAM: limited exam d/t body habitus. The uterus is non tender. Ramesh adnexa reveal no tenderness.  PSYCHIATRIC: Patient is oriented to person, place, and time. Mood and affect are normal.    Assessment:         ICD-10-CM ICD-9-CM   1. Pap smear for cervical cancer screening  Z12.4 V76.2   2. Class 3 severe obesity with body mass index (BMI) of 40.0 to 44.9 in adult, unspecified obesity type, unspecified whether serious comorbidity present  E66.813 278.01    E66.01 V85.41    Z68.41      Plan:   Leatha was seen today for gynecologic exam.    Diagnoses and all orders for this visit:    Pap smear for cervical cancer screening  -     Ambulatory referral/consult to Gynecology  -     Liquid-Based Pap Smear, Screening    Class 3 severe obesity with body mass index (BMI) of 40.0 to 44.9 in adult, unspecified obesity type, unspecified whether serious comorbidity present    Pap today, if normal can proceed with pelvic exams  Healthy lifestyle choices. Importance of maintaining a healthy BMI. Healthy diet including limiting fast foods, processed foods, foods containing high amounts of saturated fats or high sodium content. Recommend low carb, low fat diet rich in lean meat and fish, non starchy vegetables, fruits and good fat while maintaining portion control. Limit sugar intake. Recommended plenty of water, avoid carbonated beverages and high fructose corn syrup. Regular cardiovascular exercise, at least 150 minutes of cardiovascular exercise (at least 30 mins 5x/week).  Follow up in about 1 year (around 10/4/2025) for annual exam.

## 2024-10-10 ENCOUNTER — TELEPHONE (OUTPATIENT)
Dept: GYNECOLOGY | Facility: CLINIC | Age: 69
End: 2024-10-10
Payer: MEDICARE

## 2024-10-10 NOTE — TELEPHONE ENCOUNTER
Called pt with results of pap smear. Spoke and with patient and Hannah (her daughter). NIL and HPV positive OHR pap, will repeat at next annual. Verbalized understanding.

## 2024-11-07 ENCOUNTER — OFFICE VISIT (OUTPATIENT)
Dept: INTERNAL MEDICINE | Facility: CLINIC | Age: 69
End: 2024-11-07
Payer: MEDICARE

## 2024-11-07 VITALS
HEART RATE: 61 BPM | BODY MASS INDEX: 40.66 KG/M2 | TEMPERATURE: 98 F | RESPIRATION RATE: 18 BRPM | WEIGHT: 253 LBS | SYSTOLIC BLOOD PRESSURE: 119 MMHG | DIASTOLIC BLOOD PRESSURE: 70 MMHG | HEIGHT: 66 IN

## 2024-11-07 DIAGNOSIS — E55.9 VITAMIN D DEFICIENCY: ICD-10-CM

## 2024-11-07 DIAGNOSIS — M25.562 CHRONIC PAIN OF LEFT KNEE: ICD-10-CM

## 2024-11-07 DIAGNOSIS — E66.813 CLASS 3 SEVERE OBESITY DUE TO EXCESS CALORIES WITH SERIOUS COMORBIDITY AND BODY MASS INDEX (BMI) OF 40.0 TO 44.9 IN ADULT: Chronic | ICD-10-CM

## 2024-11-07 DIAGNOSIS — M54.50 CHRONIC LOW BACK PAIN, UNSPECIFIED BACK PAIN LATERALITY, UNSPECIFIED WHETHER SCIATICA PRESENT: ICD-10-CM

## 2024-11-07 DIAGNOSIS — I10 PRIMARY HYPERTENSION: Primary | Chronic | ICD-10-CM

## 2024-11-07 DIAGNOSIS — G89.29 CHRONIC PAIN OF LEFT KNEE: ICD-10-CM

## 2024-11-07 DIAGNOSIS — R06.09 POST-COVID CHRONIC DYSPNEA: ICD-10-CM

## 2024-11-07 DIAGNOSIS — M17.0 PRIMARY OSTEOARTHRITIS OF BOTH KNEES: ICD-10-CM

## 2024-11-07 DIAGNOSIS — E66.01 CLASS 3 SEVERE OBESITY DUE TO EXCESS CALORIES WITH SERIOUS COMORBIDITY AND BODY MASS INDEX (BMI) OF 40.0 TO 44.9 IN ADULT: Chronic | ICD-10-CM

## 2024-11-07 DIAGNOSIS — R54 FRAILTY: Chronic | ICD-10-CM

## 2024-11-07 DIAGNOSIS — Z86.718 PERSONAL HISTORY OF OTHER VENOUS THROMBOSIS AND EMBOLISM: Chronic | ICD-10-CM

## 2024-11-07 DIAGNOSIS — Z00.00 WELL ADULT EXAM: ICD-10-CM

## 2024-11-07 DIAGNOSIS — G89.29 CHRONIC LOW BACK PAIN, UNSPECIFIED BACK PAIN LATERALITY, UNSPECIFIED WHETHER SCIATICA PRESENT: ICD-10-CM

## 2024-11-07 DIAGNOSIS — E78.49 OTHER HYPERLIPIDEMIA: Chronic | ICD-10-CM

## 2024-11-07 DIAGNOSIS — M25.511 ACUTE PAIN OF RIGHT SHOULDER: ICD-10-CM

## 2024-11-07 DIAGNOSIS — D50.8 OTHER IRON DEFICIENCY ANEMIA: Chronic | ICD-10-CM

## 2024-11-07 DIAGNOSIS — U09.9 POST-COVID CHRONIC DYSPNEA: ICD-10-CM

## 2024-11-07 DIAGNOSIS — K21.9 GASTRO-ESOPHAGEAL REFLUX DISEASE WITHOUT ESOPHAGITIS: Chronic | ICD-10-CM

## 2024-11-07 PROCEDURE — 99215 OFFICE O/P EST HI 40 MIN: CPT | Mod: PBBFAC | Performed by: NURSE PRACTITIONER

## 2024-11-07 RX ORDER — GABAPENTIN 300 MG/1
300 CAPSULE ORAL NIGHTLY
Qty: 90 CAPSULE | Refills: 1 | Status: SHIPPED | OUTPATIENT
Start: 2024-11-07

## 2024-11-07 RX ORDER — ALBUTEROL SULFATE 90 UG/1
2 INHALANT RESPIRATORY (INHALATION) EVERY 6 HOURS PRN
Qty: 18 G | Refills: 3 | Status: SHIPPED | OUTPATIENT
Start: 2024-11-07 | End: 2025-11-07

## 2024-11-07 RX ORDER — PANTOPRAZOLE SODIUM 40 MG/1
40 TABLET, DELAYED RELEASE ORAL DAILY
Qty: 90 TABLET | Refills: 1 | Status: SHIPPED | OUTPATIENT
Start: 2024-11-07

## 2024-11-07 NOTE — PROGRESS NOTES
Patient ID: [id number]     Chief Complaint: LAB RESULTS        HPI:     VAN Martinez is a 69 y.o. female here today for a follow up. Pt did not have labs done prior to today's appt.           Immunizations:   Immunization History   Administered Date(s) Administered    Influenza (FLUAD) - Quadrivalent - Adjuvanted - PF *Preferred* (65+) 10/04/2022, 09/26/2023    Tdap 05/22/2017        -------------------------------------    Asthma    Digestive disorder    Hyperlipidemia    Hypertension    Kidney hematoma    blood clot on left    Mass of left breast on mammogram    Personal history of colonic polyps        Past Surgical History:   Procedure Laterality Date    COLONOSCOPY  04/14/2022    TONSILLECTOMY      TRANSFORAMINAL EPIDURAL INJECTION OF STEROID Bilateral 8/21/2024    Procedure: Injection,steroid,epidural,transforaminal approach;  Surgeon: Yeny Adams MD;  Location: [facility] OR;  Service: Pain Management;  Laterality: Bilateral;  Bilateral TFESI L3       Review of patient's allergies indicates:   Allergen Reactions    Penicillins Hives    Aspirin Rash    Keflex [cephalexin] Rash    Tramadol Rash       Current Outpatient Medications   Medication Instructions    albuterol (PROVENTIL) 2.5 mg, Nebulization, Every 6 hours PRN, Rescue    albuterol (VENTOLIN HFA) 90 mcg/actuation inhaler 2 puffs, Inhalation, Every 6 hours PRN, Rescue    amLODIPine (NORVASC) 10 mg, Oral, Daily    DULoxetine (CYMBALTA) 30 mg, Oral, Daily    ergocalciferol (ERGOCALCIFEROL) 50,000 Units, Oral, Every 7 days    ferrous gluconate (FERGON) 324 mg, Oral, With breakfast    furosemide (LASIX) 20 mg, Oral, Daily    gabapentin (NEURONTIN) 300 MG capsule 1 capsule    methIMAzole (TAPAZOLE) 5 mg, 2 times daily    nitrofurantoin (MACRODANTIN) 50 MG capsule 1 capsule at bedtime with food or milk Orally Once a day    pantoprazole (PROTONIX) 40 mg, Oral, Daily    rivaroxaban (XARELTO) 20 mg, Oral, With dinner    simvastatin (ZOCOR) 20 MG  tablet 1 tablet in the evening Orally Once a day for 30 day(s)    walker Misc 1 each, Misc.(Non-Drug; Combo Route), Daily, Please assist pt with obtaining Rollator to assist with mobility.       Social History     Socioeconomic History    Marital status: Single    Number of children: 3   Tobacco Use    Smoking status: Never     Passive exposure: Current    Smokeless tobacco: Never   Substance and Sexual Activity    Alcohol use: Never    Drug use: Never    Sexual activity: Not Currently     Partners: Male     Social Drivers of Health     Financial Resource Strain: Medium Risk (9/9/2024)    Overall Financial Resource Strain (CARDIA)     Difficulty of Paying Living Expenses: Somewhat hard   Food Insecurity: Food Insecurity Present (9/9/2024)    Hunger Vital Sign     Worried About Running Out of Food in the Last Year: Sometimes true     Ran Out of Food in the Last Year: Sometimes true   Transportation Needs: No Transportation Needs (11/7/2024)    TRANSPORTATION NEEDS     Transportation : No   Physical Activity: Inactive (9/10/2024)    Exercise Vital Sign     Days of Exercise per Week: 0 days     Minutes of Exercise per Session: 0 min   Stress: No Stress Concern Present (9/9/2024)    [employer] [facility] - Occupational Stress Questionnaire     Feeling of Stress : Only a little   Housing Stability: High Risk (9/9/2024)    Housing Stability Vital Sign     Unable to Pay for Housing in the Last Year: Yes        Family History   Problem Relation Name Age of Onset    Heart attack Mother      Thyroid disease Mother      Diabetes Father      Thyroid disease Father      Heart attack Father          Patient Care Team:  Shauna Samson FNP as PCP - General (Family Medicine)     Subjective:     Review of Systems     See HPI for details    Constitutional: Denies Change in appetite. Denies Chills. Denies Fever. Denies Night sweats.  Eye: Denies Blurred vision. Denies Discharge. Denies Eye pain.  ENT: Denies  "Decreased hearing. Denies Sore throat. Denies Swollen glands.  Respiratory: Denies Cough. Denies Shortness of breath. Denies Shortness of breath with exertion. Denies Wheezing.  Cardiovascular: DeniesChest pain at rest. Denies Chest pain with exertion. Denies Irregular heartbeat. Denies Palpitations. Denies Edema.  Gastrointestinal: Denies Abdominal pain. DeniesDiarrhea. Denies Nausea. Denies Vomiting. Denies Hematemesis or Hematochezia.  Genitourinary: Denies Dysuria. Denies Urinary frequency. Denies Urinary urgency. Denies Blood in urine.  Endocrine: Denies Cold intolerance. Denies Excessive thirst. Denies Heat intolerance. Denies Weight loss. Denies Weight gain.  Musculoskeletal: Denies Painful joints. Denies Weakness.  Integumentary: Denies Rash. Denies Itching. Denies Dry skin.  Neurologic: Denies Dizziness. Denies Fainting. Denies Headache.  Psychiatric: Denies Depression. Denies Anxiety. Denies Suicidal/Homicidal ideations.    All Other ROS: Negative except as stated in HPI.       Objective:     Visit Vitals  /70 (BP Location: Right arm, Patient Position: Sitting)   Pulse 61   Temp 98.1 °F (36.7 °C) (Oral)   Resp 18   Ht 5' 6" (1.676 m)   Wt 114.8 kg (253 lb)   BMI 40.84 kg/m²       Physical Exam    General: Alert and oriented, No acute distress.  Head: Normocephalic, Atraumatic.  Eye: Pupils are equal, round and reactive to light, Extraocular movements are intact, Sclera non-icteric.  Ears/Nose/Throat: Normal, Mucosa moist,Clear.  Neck/Thyroid: Supple, Non-tender, No carotid bruit, No lymphadenopathy, No JVD, Full range of motion.  Respiratory: Clear to auscultation bilaterally; No wheezes, rales or rhonchi,Non-labored respirations, Symmetrical chest wall expansion.  Cardiovascular: Regular rate and rhythm, S1/S2 normal, No murmurs, rubs or gallops.  Gastrointestinal: Soft, Non-tender, Non-distended, Normal bowel sounds, No palpable organomegaly.  Musculoskeletal: Normal range of " motion.  Integumentary: Warm, Dry, Intact, No suspicious lesions or rashes.  Extremities: No clubbing, cyanosis or edema  Neurologic: No focal deficits, Cranial Nerves II-XII are grossly intact, Motor strength normal upper and lower extremities, Sensory exam intact.  Psychiatric: Normal interaction, Coherent speech, Euthymic mood, Appropriate affect       Labs Reviewed:     Chemistry:  Lab Results   Component Value Date     04/14/2024    K 4.3 04/14/2024    BUN 17.5 04/14/2024    CREATININE 0.81 04/14/2024    EGFRNORACEVR >60 04/14/2024    GLUCOSE 93 04/14/2024    CALCIUM 8.7 04/14/2024    ALKPHOS 136 04/14/2024    LABPROT 7.5 04/14/2024    ALBUMIN 3.2 (L) 04/14/2024    BILIDIR 0.2 03/15/2022    IBILI 0.10 03/15/2022    AST 15 04/14/2024    ALT 11 04/14/2024    MG 1.90 07/29/2023    PHOS 3.5 08/22/2021    MQIUASIX98OK 12.1 (L) 10/04/2022        Lab Results   Component Value Date    HGBA1C 6.3 10/04/2022        Hematology:  Lab Results   Component Value Date    WBC 8.90 05/07/2024    HGB 9.0 (L) 05/07/2024    HCT 29.9 (L) 05/07/2024     05/07/2024       Lipid Panel:  Lab Results   Component Value Date    CHOL 154 03/12/2024    HDL 34 (L) 03/12/2024    .00 03/12/2024    TRIG 84 03/12/2024    TOTALCHOLEST 5 03/12/2024        Urine:  Lab Results   Component Value Date    APPEARANCEUA Clear 08/29/2024    APPEARANCEUA Hazy (A) 08/29/2024    SGUA 1.012 08/29/2024    SGUA 1.012 08/29/2024    PROTEINUA Negative 08/29/2024    PROTEINUA Negative 08/29/2024    KETONESUA Negative 08/29/2024    KETONESUA Negative 08/29/2024    LEUKOCYTESUR 500 (A) 08/29/2024    LEUKOCYTESUR 500 (A) 08/29/2024    RBCUA 0-5 08/29/2024    RBCUA 0-5 08/29/2024    WBCUA 51-99 (A) 08/29/2024    WBCUA 51-99 (A) 08/29/2024    BACTERIA Many (A) 08/29/2024    BACTERIA Many (A) 08/29/2024    SQEPUA Occ (A) 08/29/2024    SQEPUA Few (A) 08/29/2024    HYALINECASTS None Seen 08/29/2024    HYALINECASTS None Seen 08/29/2024    CREATRANDUR 71.4  08/29/2024        Assessment:       ICD-10-CM ICD-9-CM   1. Primary hypertension  I10 401.9   2. Other hyperlipidemia  E78.49 272.4   3. Personal history of other venous thrombosis and embolism  Z86.718 V12.51   4. Class 3 severe obesity due to excess calories with serious comorbidity and body mass index (BMI) of 40.0 to 44.9 in adult  E66.813 278.01    E66.01 V85.41    Z68.41    5. Well adult exam  Z00.00 V70.0   6. Other iron deficiency anemia  D50.8 280.8   7. Vitamin D deficiency  E55.9 268.9   8. Frailty  R54 797   9. Chronic low back pain, unspecified back pain laterality, unspecified whether sciatica present  M54.50 724.2    G89.29 338.29   10. Primary osteoarthritis of both knees  M17.0 715.16        Plan:     1. Primary hypertension (Primary)  BP controlled. Low fat low salt diet and exercise. Cont Amlodipine as prescribed.     2. Other hyperlipidemia  Low fat diet and exercise. Cont Simvastatin as prescribed.     3. Personal history of other venous thrombosis and embolism  Cont Xeralto as prescribed. Pt managed in Cardio cl. Pt states was informed she would need life long therapy. Keep Cardio cl appt.     4. Class 3 severe obesity due to excess calories with serious comorbidity and body mass index (BMI) of 40.0 to 44.9 in adult  BMI 40.84 Encouraged low fat diet and exercise. Education provided.     5. Well adult exam  Labs in 3 months. UTD DEXA - WNL- next due (9/2025)     6. Other iron deficiency anemia  Pt referred per Tiffany Harris for Iron infusions. CBC iron level in 3 months.     7. Vitamin D deficiency  Vit D level in 3 months.     8. Frailty  Pt ambulating with walker due to chronic knee pain, LBP.     9. Chronic low back pain, unspecified back pain laterality, unspecified whether sciatica present  Pt followed by Dr Yeny Frank pain management for chronic back pain. RX for rollator to assist with ambulation was sent to [facility] in [place].     10. Primary osteoarthritis of both knees  RX for  rollator printed and will fax to [facility] in [place] for assistance with obtaining rollator.          Follow up in about 3 months (around 2/7/2025) for with labs 1 week prior to appt. . In addition to their scheduled follow up, the patient has also been instructed to follow up on as needed basis.     Future Appointments   Date Time Provider Department Center   2/3/2025  9:45 AM Ana Cristina Leiva PA-C [facility]   10/7/2025  9:50 AM Lorena Agee, ALYSHA [facility]        RENU Iglesias

## 2024-11-26 ENCOUNTER — HOSPITAL ENCOUNTER (OUTPATIENT)
Dept: RADIOLOGY | Facility: HOSPITAL | Age: 69
Discharge: HOME | End: 2024-11-26
Attending: NURSE PRACTITIONER
Payer: MEDICARE

## 2024-11-26 DIAGNOSIS — M25.562 CHRONIC PAIN OF LEFT KNEE: ICD-10-CM

## 2024-11-26 DIAGNOSIS — G89.29 CHRONIC PAIN OF LEFT KNEE: ICD-10-CM

## 2024-11-26 DIAGNOSIS — M25.511 ACUTE PAIN OF RIGHT SHOULDER: ICD-10-CM

## 2024-11-26 PROCEDURE — 73030 X-RAY EXAM OF SHOULDER: CPT | Mod: TC,RT

## 2024-11-26 PROCEDURE — 73560 X-RAY EXAM OF KNEE 1 OR 2: CPT | Mod: TC,LT

## 2024-12-17 ENCOUNTER — PATIENT MESSAGE (OUTPATIENT)
Dept: INTERNAL MEDICINE | Facility: CLINIC | Age: 69
End: 2024-12-17
Payer: MEDICARE

## 2024-12-17 ENCOUNTER — HOSPITAL ENCOUNTER (EMERGENCY)
Facility: HOSPITAL | Age: 69
Discharge: HOME | End: 2024-12-17
Attending: EMERGENCY MEDICINE
Payer: MEDICARE

## 2024-12-17 VITALS
TEMPERATURE: 99 F | DIASTOLIC BLOOD PRESSURE: 74 MMHG | BODY MASS INDEX: 40.98 KG/M2 | HEART RATE: 92 BPM | HEIGHT: 66 IN | WEIGHT: 255 LBS | RESPIRATION RATE: 20 BRPM | OXYGEN SATURATION: 99 % | SYSTOLIC BLOOD PRESSURE: 152 MMHG

## 2024-12-17 DIAGNOSIS — M25.562 CHRONIC PAIN OF LEFT KNEE: ICD-10-CM

## 2024-12-17 DIAGNOSIS — G89.29 CHRONIC PAIN OF LEFT KNEE: ICD-10-CM

## 2024-12-17 DIAGNOSIS — R07.9 CHEST PAIN: ICD-10-CM

## 2024-12-17 DIAGNOSIS — F43.21 GRIEF REACTION: Primary | ICD-10-CM

## 2024-12-17 PROCEDURE — 99284 EMERGENCY DEPT VISIT MOD MDM: CPT | Mod: 25

## 2024-12-17 PROCEDURE — 25000003 PHARM REV CODE 250: Performed by: EMERGENCY MEDICINE

## 2024-12-17 PROCEDURE — 93010 ELECTROCARDIOGRAM REPORT: CPT | Mod: ,,, | Performed by: INTERNAL MEDICINE

## 2024-12-17 PROCEDURE — 93005 ELECTROCARDIOGRAM TRACING: CPT

## 2024-12-17 RX ORDER — LORAZEPAM 1 MG/1
1 TABLET ORAL
Status: COMPLETED | OUTPATIENT
Start: 2024-12-17 | End: 2024-12-17

## 2024-12-17 RX ORDER — HYDROCODONE BITARTRATE AND ACETAMINOPHEN 5; 325 MG/1; MG/1
1 TABLET ORAL
Status: COMPLETED | OUTPATIENT
Start: 2024-12-17 | End: 2024-12-17

## 2024-12-17 RX ORDER — HYDROCODONE BITARTRATE AND ACETAMINOPHEN 5; 325 MG/1; MG/1
1 TABLET ORAL EVERY 6 HOURS PRN
Qty: 12 TABLET | Refills: 0 | Status: SHIPPED | OUTPATIENT
Start: 2024-12-17 | End: 2024-12-20

## 2024-12-17 RX ADMIN — LORAZEPAM 1 MG: 1 TABLET ORAL at 03:12

## 2024-12-17 RX ADMIN — HYDROCODONE BITARTRATE AND ACETAMINOPHEN 1 TABLET: 5; 325 TABLET ORAL at 04:12

## 2024-12-17 NOTE — ED PROVIDER NOTES
"Encounter Date: 12/17/2024    SCRIBE #1 NOTE: I, Eduarda Real, am scribing for, and in the presence of,  Jason Coates MD. I have scribed the following portions of the note - Other sections scribed: HPI, ROS, PE.       History     Chief Complaint   Patient presents with    Anxiety     Pt sent here by daughter for anxiety. Daughter also reports that pt goes in and out of consciousness since COVID. GCS-14 at baseline.      Patient is a 69 year old female with a history of asthma, HLD, and HTN that presents to the ED from home for anxiety today. Patient complains of generalized weakness, chest pain, decreased appetite, and recent stress. She is unsure why is has been stressed recently. Patient frequently responds with "I don't know" when asked questions. The patient states her daughter and grandson are on the way.      EMS gave report to the patient's nurse upon ED arrival. Patient was sent here from home by daughter for anxiety. Patient's grandson fell and hit his head at her house. Later on, the patient called EMS. Upon EMS arrival to the patient's house, the grandson was found dead. Patient's daughter had reported she has been a baseline of GCS 14 since previous COVID diagnosis.     The history is provided by the patient and medical records.     Review of patient's allergies indicates:   Allergen Reactions    Penicillins Hives    Aspirin Rash    Keflex [cephalexin] Rash    Tramadol Rash     Past Medical History:   Diagnosis Date    Asthma     Digestive disorder     Hyperlipidemia     Hypertension     Kidney hematoma     blood clot on left    Mass of left breast on mammogram     Personal history of colonic polyps 04/14/2022     Past Surgical History:   Procedure Laterality Date    COLONOSCOPY  04/14/2022    TONSILLECTOMY      TRANSFORAMINAL EPIDURAL INJECTION OF STEROID Bilateral 8/21/2024    Procedure: Injection,steroid,epidural,transforaminal approach;  Surgeon: Yeny Adams MD;  Location: [facility] OR;  " Service: Pain Management;  Laterality: Bilateral;  Bilateral TFESI L3     Family History   Problem Relation Name Age of Onset    Heart attack Mother      Thyroid disease Mother      Diabetes Father      Thyroid disease Father      Heart attack Father       Social History     Tobacco Use    Smoking status: Never     Passive exposure: Current    Smokeless tobacco: Never   Substance Use Topics    Alcohol use: Never    Drug use: Never     Review of Systems   Reason unable to perform ROS: Reported baseline GCS 14.   Constitutional:  Positive for appetite change.   Cardiovascular:  Positive for chest pain.   Psychiatric/Behavioral:  The patient is nervous/anxious.         Recent stress       Physical Exam     Initial Vitals [12/17/24 1450]   BP Pulse Resp Temp SpO2   (!) 155/70 91 17 98.8 °F (37.1 °C) 98 %      MAP       --         Physical Exam    Nursing note and vitals reviewed.  Constitutional: She is Obese . No distress.   HENT:   Head: Normocephalic and atraumatic.   Right Ear: Tympanic membrane normal.   Left Ear: Tympanic membrane normal. Mouth/Throat: Oropharynx is clear and moist. No oropharyngeal exudate or posterior oropharyngeal erythema.   Eyes: EOM are normal.   Neck: Trachea normal. Neck supple. Carotid bruit is not present. No JVD present.   Normal range of motion.  Cardiovascular:  Normal rate and regular rhythm.           No murmur heard.  Pulmonary/Chest: Breath sounds normal. No respiratory distress. She exhibits no tenderness.   Abdominal: Abdomen is soft. Bowel sounds are normal. She exhibits no distension. There is no abdominal tenderness.   Musculoskeletal:         General: Normal range of motion.      Cervical back: Normal range of motion and neck supple.      Lumbar back: Normal. Normal range of motion.     Neurological: She is alert. She has normal strength. No cranial nerve deficit or sensory deficit.         ED Course   Procedures  Labs Reviewed   CBC W/ AUTO DIFFERENTIAL    Narrative:      The following orders were created for panel order CBC auto differential.  Procedure                               Abnormality         Status                     ---------                               -----------         ------                     CBC with Differential[[id number]]                                                        Please view results for these tests on the individual orders.   B-TYPE NATRIURETIC PEPTIDE   COMPREHENSIVE METABOLIC PANEL   TROPONIN I   MAGNESIUM   CBC WITH DIFFERENTIAL     EKG Readings: (Independently Interpreted)   Initial Reading: No STEMI. Rhythm: Normal Sinus Rhythm. Heart Rate: 85. Ectopy: No Ectopy. Conduction: Normal. ST Segments: Normal ST Segments. T Waves Flipped: III. Q Waves: III. Clinical Impression: Normal Sinus Rhythm       Imaging Results              X-Ray Chest AP Portable (In process)                      Medications   LORazepam tablet 1 mg (1 mg Oral Given 12/17/24 9784)     Medical Decision Making  Differential diagnosis includes, but is not limited to, grief reaction, anxiety, conversion reaction, and takotsubo cardiomyopathy.     Amount and/or Complexity of Data Reviewed  Labs: ordered.  Radiology: ordered and independent interpretation performed.  ECG/medicine tests: ordered and independent interpretation performed.    Risk  Prescription drug management.            Scribe Attestation:   Scribe #1: I performed the above scribed service and the documentation accurately describes the services I performed. I attest to the accuracy of the note.    Attending Attestation:           Physician Attestation for Scribe:  Physician Attestation Statement for Scribe #1: I, Jason Coates MD, reviewed documentation, as scribed by Eduarda Noble in my presence, and it is both accurate and complete.             ED Course as of 12/17/24 1630   Tue Dec 17, 2024   1627 Patient's niece at the bedside and corroborated the story.  Patient is now tearful and clearly grieving  over the loss of her grandson.  The niece affirms that there are no other pressing medical issues currently.  Will D/C with her family so that they may grieve. [CL]      ED Course User Index  [CL] Jason Coates MD                           Clinical Impression:  Final diagnoses:  [R07.9] Chest pain  [F43.21] Grief reaction (Primary)          ED Disposition Condition    Discharge Stable          ED Prescriptions    None       Follow-up Information       Follow up With Specialties Details Why Contact Info    Shauna Samson, [facility]   [address] [address]  [phone]               Jason Coates MD  12/17/24 3269

## 2024-12-19 LAB
OHS QRS DURATION: 82 MS
OHS QTC CALCULATION: 435 MS

## 2024-12-19 NOTE — TELEPHONE ENCOUNTER
Called patient's daughter (Jennifer) to check on her mom to see if she needed a sooner appointment with ABBEY Samson. Patient's daughter informed me her mom had been taken to [facility] due to her grandson passing. Patient's daughter informed me they were in the process of making  arrangement for her grandson. Patient's daughter  informed me that if her mom condition would change she would call for an appointment with ABBEY Samson.

## 2025-01-04 ENCOUNTER — PATIENT MESSAGE (OUTPATIENT)
Dept: INTERNAL MEDICINE | Facility: CLINIC | Age: 70
End: 2025-01-04
Payer: MEDICARE

## 2025-01-30 ENCOUNTER — PATIENT MESSAGE (OUTPATIENT)
Dept: INTERNAL MEDICINE | Facility: CLINIC | Age: 70
End: 2025-01-30
Payer: MEDICARE

## 2025-01-31 ENCOUNTER — LAB VISIT (OUTPATIENT)
Dept: LAB | Facility: HOSPITAL | Age: 70
End: 2025-01-31
Attending: NURSE PRACTITIONER
Payer: MEDICARE

## 2025-01-31 DIAGNOSIS — E55.9 VITAMIN D DEFICIENCY: ICD-10-CM

## 2025-01-31 DIAGNOSIS — I10 PRIMARY HYPERTENSION: ICD-10-CM

## 2025-01-31 DIAGNOSIS — D50.8 OTHER IRON DEFICIENCY ANEMIA: ICD-10-CM

## 2025-01-31 LAB
25(OH)D3+25(OH)D2 SERPL-MCNC: 9 NG/ML (ref 30–80)
ALBUMIN SERPL-MCNC: 3.3 G/DL (ref 3.4–4.8)
ALBUMIN/GLOB SERPL: 0.7 RATIO (ref 1.1–2)
ALP SERPL-CCNC: 120 UNIT/L (ref 40–150)
ALT SERPL-CCNC: 12 UNIT/L (ref 0–55)
ANION GAP SERPL CALC-SCNC: 6 MEQ/L
AST SERPL-CCNC: 14 UNIT/L (ref 5–34)
BACTERIA #/AREA URNS AUTO: ABNORMAL /HPF
BASOPHILS # BLD AUTO: 0.04 X10(3)/MCL
BASOPHILS NFR BLD AUTO: 0.4 %
BILIRUB SERPL-MCNC: 0.2 MG/DL
BILIRUB UR QL STRIP.AUTO: NEGATIVE
BUN SERPL-MCNC: 13.8 MG/DL (ref 9.8–20.1)
CALCIUM SERPL-MCNC: 8.7 MG/DL (ref 8.4–10.2)
CHLORIDE SERPL-SCNC: 110 MMOL/L (ref 98–107)
CLARITY UR: CLEAR
CO2 SERPL-SCNC: 29 MMOL/L (ref 23–31)
COLOR UR AUTO: ABNORMAL
CREAT SERPL-MCNC: 0.86 MG/DL (ref 0.55–1.02)
CREAT/UREA NIT SERPL: 16
EOSINOPHIL # BLD AUTO: 0.41 X10(3)/MCL (ref 0–0.9)
EOSINOPHIL NFR BLD AUTO: 4.5 %
ERYTHROCYTE [DISTWIDTH] IN BLOOD BY AUTOMATED COUNT: 13.6 % (ref 11.5–17)
GFR SERPLBLD CREATININE-BSD FMLA CKD-EPI: >60 ML/MIN/1.73/M2
GLOBULIN SER-MCNC: 4.7 GM/DL (ref 2.4–3.5)
GLUCOSE SERPL-MCNC: 107 MG/DL (ref 82–115)
GLUCOSE UR QL STRIP: NORMAL
HCT VFR BLD AUTO: 35.6 % (ref 37–47)
HGB BLD-MCNC: 11.2 G/DL (ref 12–16)
HGB UR QL STRIP: NEGATIVE
HYALINE CASTS #/AREA URNS LPF: ABNORMAL /LPF
IMM GRANULOCYTES # BLD AUTO: 0.02 X10(3)/MCL (ref 0–0.04)
IMM GRANULOCYTES NFR BLD AUTO: 0.2 %
IRON SATN MFR SERPL: 13 % (ref 20–50)
IRON SERPL-MCNC: 42 UG/DL (ref 50–170)
KETONES UR QL STRIP: NEGATIVE
LEUKOCYTE ESTERASE UR QL STRIP: 75
LYMPHOCYTES # BLD AUTO: 2.25 X10(3)/MCL (ref 0.6–4.6)
LYMPHOCYTES NFR BLD AUTO: 24.6 %
MCH RBC QN AUTO: 26.5 PG (ref 27–31)
MCHC RBC AUTO-ENTMCNC: 31.5 G/DL (ref 33–36)
MCV RBC AUTO: 84.4 FL (ref 80–94)
MONOCYTES # BLD AUTO: 0.69 X10(3)/MCL (ref 0.1–1.3)
MONOCYTES NFR BLD AUTO: 7.5 %
NEUTROPHILS # BLD AUTO: 5.75 X10(3)/MCL (ref 2.1–9.2)
NEUTROPHILS NFR BLD AUTO: 62.8 %
NITRITE UR QL STRIP: NEGATIVE
NRBC BLD AUTO-RTO: 0 %
PH UR STRIP: 7.5 [PH]
PLATELET # BLD AUTO: 251 X10(3)/MCL (ref 130–400)
PMV BLD AUTO: 10.5 FL (ref 7.4–10.4)
POTASSIUM SERPL-SCNC: 4.7 MMOL/L (ref 3.5–5.1)
PROT SERPL-MCNC: 8 GM/DL (ref 5.8–7.6)
PROT UR QL STRIP: NEGATIVE
RBC # BLD AUTO: 4.22 X10(6)/MCL (ref 4.2–5.4)
RBC #/AREA URNS AUTO: ABNORMAL /HPF
SODIUM SERPL-SCNC: 145 MMOL/L (ref 136–145)
SP GR UR STRIP.AUTO: 1.01 (ref 1–1.03)
SQUAMOUS #/AREA URNS LPF: ABNORMAL /HPF
T4 FREE SERPL-MCNC: 0.81 NG/DL (ref 0.7–1.48)
TIBC SERPL-MCNC: 276 UG/DL (ref 70–310)
TIBC SERPL-MCNC: 318 UG/DL (ref 250–450)
TRANSFERRIN SERPL-MCNC: 288 MG/DL (ref 173–360)
TSH SERPL-ACNC: 0.55 UIU/ML (ref 0.35–4.94)
UROBILINOGEN UR STRIP-ACNC: NORMAL
WBC # BLD AUTO: 9.16 X10(3)/MCL (ref 4.5–11.5)
WBC #/AREA URNS AUTO: ABNORMAL /HPF

## 2025-01-31 PROCEDURE — 84443 ASSAY THYROID STIM HORMONE: CPT

## 2025-01-31 PROCEDURE — 36415 COLL VENOUS BLD VENIPUNCTURE: CPT

## 2025-01-31 PROCEDURE — 82306 VITAMIN D 25 HYDROXY: CPT

## 2025-01-31 PROCEDURE — 84439 ASSAY OF FREE THYROXINE: CPT

## 2025-01-31 PROCEDURE — 87077 CULTURE AEROBIC IDENTIFY: CPT

## 2025-01-31 PROCEDURE — 85025 COMPLETE CBC W/AUTO DIFF WBC: CPT

## 2025-01-31 PROCEDURE — 83540 ASSAY OF IRON: CPT

## 2025-01-31 PROCEDURE — 81001 URINALYSIS AUTO W/SCOPE: CPT

## 2025-01-31 PROCEDURE — 80053 COMPREHEN METABOLIC PANEL: CPT

## 2025-02-02 LAB — BACTERIA UR CULT: ABNORMAL

## 2025-02-02 NOTE — TELEPHONE ENCOUNTER
Pt's family was informed by staff to bring pt to [facility] as walk in to get evaluated by a counselor or ER if has SI/HI.

## 2025-02-03 ENCOUNTER — OFFICE VISIT (OUTPATIENT)
Dept: INTERNAL MEDICINE | Facility: CLINIC | Age: 70
End: 2025-02-03
Payer: MEDICARE

## 2025-02-03 VITALS
HEIGHT: 66 IN | RESPIRATION RATE: 18 BRPM | TEMPERATURE: 98 F | DIASTOLIC BLOOD PRESSURE: 60 MMHG | WEIGHT: 268.63 LBS | BODY MASS INDEX: 43.17 KG/M2 | SYSTOLIC BLOOD PRESSURE: 118 MMHG | HEART RATE: 74 BPM

## 2025-02-03 DIAGNOSIS — E55.9 VITAMIN D DEFICIENCY: ICD-10-CM

## 2025-02-03 DIAGNOSIS — R06.09 POST-COVID CHRONIC DYSPNEA: ICD-10-CM

## 2025-02-03 DIAGNOSIS — U09.9 POST-COVID CHRONIC DYSPNEA: ICD-10-CM

## 2025-02-03 DIAGNOSIS — E78.49 OTHER HYPERLIPIDEMIA: Chronic | ICD-10-CM

## 2025-02-03 DIAGNOSIS — G47.00 INSOMNIA, UNSPECIFIED TYPE: ICD-10-CM

## 2025-02-03 DIAGNOSIS — R06.02 SOB (SHORTNESS OF BREATH): ICD-10-CM

## 2025-02-03 DIAGNOSIS — G89.29 CHRONIC LOW BACK PAIN, UNSPECIFIED BACK PAIN LATERALITY, UNSPECIFIED WHETHER SCIATICA PRESENT: ICD-10-CM

## 2025-02-03 DIAGNOSIS — N39.0 E-COLI UTI: ICD-10-CM

## 2025-02-03 DIAGNOSIS — D50.8 OTHER IRON DEFICIENCY ANEMIA: Chronic | ICD-10-CM

## 2025-02-03 DIAGNOSIS — K21.9 GASTRO-ESOPHAGEAL REFLUX DISEASE WITHOUT ESOPHAGITIS: Chronic | ICD-10-CM

## 2025-02-03 DIAGNOSIS — M54.50 CHRONIC LOW BACK PAIN, UNSPECIFIED BACK PAIN LATERALITY, UNSPECIFIED WHETHER SCIATICA PRESENT: ICD-10-CM

## 2025-02-03 DIAGNOSIS — Z12.11 COLON CANCER SCREENING: ICD-10-CM

## 2025-02-03 DIAGNOSIS — Z00.00 WELL ADULT EXAM: ICD-10-CM

## 2025-02-03 DIAGNOSIS — Z12.11 SCREENING FOR COLON CANCER: ICD-10-CM

## 2025-02-03 DIAGNOSIS — B96.20 E-COLI UTI: ICD-10-CM

## 2025-02-03 DIAGNOSIS — R54 FRAILTY: Chronic | ICD-10-CM

## 2025-02-03 DIAGNOSIS — Z86.718 PERSONAL HISTORY OF OTHER VENOUS THROMBOSIS AND EMBOLISM: Chronic | ICD-10-CM

## 2025-02-03 DIAGNOSIS — E66.9 OBESITY, UNSPECIFIED CLASS, UNSPECIFIED OBESITY TYPE, UNSPECIFIED WHETHER SERIOUS COMORBIDITY PRESENT: ICD-10-CM

## 2025-02-03 DIAGNOSIS — I10 PRIMARY HYPERTENSION: Primary | Chronic | ICD-10-CM

## 2025-02-03 PROCEDURE — 99214 OFFICE O/P EST MOD 30 MIN: CPT | Mod: S$PBB,,, | Performed by: NURSE PRACTITIONER

## 2025-02-03 PROCEDURE — 1160F RVW MEDS BY RX/DR IN RCRD: CPT | Mod: CPTII,,, | Performed by: NURSE PRACTITIONER

## 2025-02-03 PROCEDURE — 3078F DIAST BP <80 MM HG: CPT | Mod: CPTII,,, | Performed by: NURSE PRACTITIONER

## 2025-02-03 PROCEDURE — 1101F PT FALLS ASSESS-DOCD LE1/YR: CPT | Mod: CPTII,,, | Performed by: NURSE PRACTITIONER

## 2025-02-03 PROCEDURE — 99214 OFFICE O/P EST MOD 30 MIN: CPT | Mod: PBBFAC | Performed by: NURSE PRACTITIONER

## 2025-02-03 PROCEDURE — 1159F MED LIST DOCD IN RCRD: CPT | Mod: CPTII,,, | Performed by: NURSE PRACTITIONER

## 2025-02-03 PROCEDURE — 3008F BODY MASS INDEX DOCD: CPT | Mod: CPTII,,, | Performed by: NURSE PRACTITIONER

## 2025-02-03 PROCEDURE — 1125F AMNT PAIN NOTED PAIN PRSNT: CPT | Mod: CPTII,,, | Performed by: NURSE PRACTITIONER

## 2025-02-03 PROCEDURE — 3074F SYST BP LT 130 MM HG: CPT | Mod: CPTII,,, | Performed by: NURSE PRACTITIONER

## 2025-02-03 PROCEDURE — 3288F FALL RISK ASSESSMENT DOCD: CPT | Mod: CPTII,,, | Performed by: NURSE PRACTITIONER

## 2025-02-03 RX ORDER — PANTOPRAZOLE SODIUM 40 MG/1
40 TABLET, DELAYED RELEASE ORAL DAILY
Qty: 90 TABLET | Refills: 1 | Status: SHIPPED | OUTPATIENT
Start: 2025-02-03

## 2025-02-03 RX ORDER — ASPIRIN 325 MG
50000 TABLET, DELAYED RELEASE (ENTERIC COATED) ORAL WEEKLY
Qty: 8 CAPSULE | Refills: 0 | Status: SHIPPED | OUTPATIENT
Start: 2025-02-03 | End: 2025-03-25

## 2025-02-03 RX ORDER — GABAPENTIN 300 MG/1
300 CAPSULE ORAL NIGHTLY
Qty: 90 CAPSULE | Refills: 1 | Status: SHIPPED | OUTPATIENT
Start: 2025-02-03

## 2025-02-03 RX ORDER — ALBUTEROL SULFATE 90 UG/1
2 INHALANT RESPIRATORY (INHALATION) EVERY 6 HOURS PRN
Qty: 18 G | Refills: 3 | Status: SHIPPED | OUTPATIENT
Start: 2025-02-03 | End: 2026-02-03

## 2025-02-03 RX ORDER — NITROFURANTOIN 25; 75 MG/1; MG/1
100 CAPSULE ORAL 2 TIMES DAILY
Qty: 14 CAPSULE | Refills: 0 | Status: SHIPPED | OUTPATIENT
Start: 2025-02-03 | End: 2025-02-10

## 2025-02-03 RX ORDER — ALBUTEROL SULFATE 0.83 MG/ML
2.5 SOLUTION RESPIRATORY (INHALATION) EVERY 6 HOURS PRN
Qty: 100 EACH | Refills: 6 | Status: SHIPPED | OUTPATIENT
Start: 2025-02-03 | End: 2026-02-03

## 2025-02-03 RX ORDER — TRAZODONE HYDROCHLORIDE 50 MG/1
50 TABLET ORAL NIGHTLY PRN
Qty: 30 TABLET | Refills: 4 | Status: SHIPPED | OUTPATIENT
Start: 2025-02-03 | End: 2026-02-03

## 2025-02-03 NOTE — PROGRESS NOTES
Patient ID: [id number]     Chief Complaint: Hypertension (Lab results)        HPI:     HPI      Leatha Martinez is a 69 y.o. female here today for a follow up. Pt states her grandson recently passed away and states she is seeing a counselor however she needs something for insomnia. Denies SI/HI.         Immunizations:   Immunization History   Administered Date(s) Administered    Influenza (FLUAD) - Quadrivalent - Adjuvanted - PF *Preferred* (65+) 10/04/2022, 09/26/2023    Tdap 05/22/2017        -------------------------------------    Asthma    Digestive disorder    Hyperlipidemia    Hypertension    Kidney hematoma    blood clot on left    Mass of left breast on mammogram    Personal history of colonic polyps        Past Surgical History:   Procedure Laterality Date    COLONOSCOPY  04/14/2022    TONSILLECTOMY      TRANSFORAMINAL EPIDURAL INJECTION OF STEROID Bilateral 8/21/2024    Procedure: Injection,steroid,epidural,transforaminal approach;  Surgeon: Yeny Adams MD;  Location: [facility];  Service: Pain Management;  Laterality: Bilateral;  Bilateral TFESI L3       Review of patient's allergies indicates:   Allergen Reactions    Penicillins Hives    Aspirin Rash    Keflex [cephalexin] Rash    Tramadol Rash       Current Outpatient Medications   Medication Instructions    albuterol (PROVENTIL) 2.5 mg, Nebulization, Every 6 hours PRN, Rescue    albuterol (VENTOLIN HFA) 90 mcg/actuation inhaler 2 puffs, Inhalation, Every 6 hours PRN, Rescue    amLODIPine (NORVASC) 10 mg, Oral, Daily    cholecalciferol (vitamin D3) 50,000 Units, Oral, Weekly    DULoxetine (CYMBALTA) 30 mg, Oral, Daily    ferrous gluconate (FERGON) 324 mg, Oral, With breakfast    furosemide (LASIX) 20 mg, Oral, Daily    gabapentin (NEURONTIN) 300 mg, Oral, Nightly    methIMAzole (TAPAZOLE) 5 mg, 2 times daily    nitrofurantoin, macrocrystal-monohydrate, (MACROBID) 100 MG capsule 100 mg, Oral, 2 times daily    pantoprazole (PROTONIX) 40 mg, Oral,  Daily    rivaroxaban (XARELTO) 20 mg, Oral, With dinner    simvastatin (ZOCOR) 20 MG tablet 1 tablet in the evening Orally Once a day for 30 day(s)    traZODone (DESYREL) 50 mg, Oral, Nightly PRN    walker Misc 1 each, Misc.(Non-Drug; Combo Route), Daily, Please assist pt with obtaining Rollator to assist with mobility.       Social History     Socioeconomic History    Marital status: Single    Number of children: 3   Tobacco Use    Smoking status: Never     Passive exposure: Current    Smokeless tobacco: Never   Substance and Sexual Activity    Alcohol use: Never    Drug use: Never    Sexual activity: Not Currently     Partners: Male     Social Drivers of Health     Financial Resource Strain: Medium Risk (9/9/2024)    Overall Financial Resource Strain (CARDIA)     Difficulty of Paying Living Expenses: Somewhat hard   Food Insecurity: Food Insecurity Present (9/9/2024)    Hunger Vital Sign     Worried About Running Out of Food in the Last Year: Sometimes true     Ran Out of Food in the Last Year: Sometimes true   Transportation Needs: No Transportation Needs (11/7/2024)    TRANSPORTATION NEEDS     Transportation : No   Physical Activity: Inactive (9/10/2024)    Exercise Vital Sign     Days of Exercise per Week: 0 days     Minutes of Exercise per Session: 0 min   Stress: No Stress Concern Present (9/9/2024)    [place] [employer] - Occupational Stress Questionnaire     Feeling of Stress : Only a little   Housing Stability: High Risk (9/9/2024)    Housing Stability Vital Sign     Unable to Pay for Housing in the Last Year: Yes        Family History   Problem Relation Name Age of Onset    Heart attack Mother      Thyroid disease Mother      Diabetes Father      Thyroid disease Father      Heart attack Father          Patient Care Team:  Shauna Samson FNP as PCP - General (Family Medicine)     Subjective:     Review of Systems     See HPI for details    Constitutional: Denies Change in  "appetite. Denies Chills. Denies Fever. Denies Night sweats.  Eye: Denies Blurred vision. Denies Discharge. Denies Eye pain.  ENT: Denies Decreased hearing. Denies Sore throat. Denies Swollen glands.  Respiratory: Denies Cough. Denies Shortness of breath. Denies Shortness of breath with exertion. Denies Wheezing.  Cardiovascular: DeniesChest pain at rest. Denies Chest pain with exertion. Denies Irregular heartbeat. Denies Palpitations. Denies Edema.  Gastrointestinal: Denies Abdominal pain. DeniesDiarrhea. Denies Nausea. Denies Vomiting. Denies Hematemesis or Hematochezia.  Genitourinary: Denies Dysuria. Denies Urinary frequency. Denies Urinary urgency. Denies Blood in urine.  Endocrine: Denies Cold intolerance. Denies Excessive thirst. Denies Heat intolerance. Denies Weight loss. Denies Weight gain.  Musculoskeletal: Denies Painful joints. Denies Weakness.  Integumentary: Denies Rash. Denies Itching. Denies Dry skin.  Neurologic: Denies Dizziness. Denies Fainting. Denies Headache.  Psychiatric: Denies Depression. Denies Anxiety. Denies Suicidal/Homicidal ideations. Admits Insomnia    All Other ROS: Negative except as stated in HPI.       Objective:     Visit Vitals  /60 (BP Location: Right arm, Patient Position: Sitting)   Pulse 74   Temp 97.9 °F (36.6 °C) (Oral)   Resp 18   Ht 5' 5.98" (1.676 m)   Wt 121.8 kg (268 lb 9.6 oz)   BMI 43.37 kg/m²       Physical Exam    General: Alert and oriented, No acute distress.  Head: Normocephalic, Atraumatic.  Eye: Pupils are equal, round and reactive to light, Extraocular movements are intact, Sclera non-icteric.  Ears/Nose/Throat: Normal, Mucosa moist,Clear.  Neck/Thyroid: Supple, Non-tender, No carotid bruit, No lymphadenopathy, No JVD, Full range of motion.  Respiratory: Clear to auscultation bilaterally; No wheezes, rales or rhonchi,Non-labored respirations, Symmetrical chest wall expansion.  Cardiovascular: Regular rate and rhythm, S1/S2 normal, No murmurs, rubs or " gallops.  Gastrointestinal: Soft, Non-tender, Non-distended, Normal bowel sounds, No palpable organomegaly.  Musculoskeletal: Normal range of motion.  Integumentary: Warm, Dry, Intact, No suspicious lesions or rashes.  Extremities: No clubbing, cyanosis or edema  Neurologic: No focal deficits, Cranial Nerves II-XII are grossly intact, Motor strength normal upper and lower extremities, Sensory exam intact.  Psychiatric: Normal interaction, Coherent speech, Euthymic mood, Appropriate affect       Labs Reviewed:     Chemistry:  Lab Results   Component Value Date     01/31/2025    K 4.7 01/31/2025    BUN 13.8 01/31/2025    CREATININE 0.86 01/31/2025    EGFRNORACEVR >60 01/31/2025    GLUCOSE 107 01/31/2025    CALCIUM 8.7 01/31/2025    ALKPHOS 120 01/31/2025    LABPROT 8.0 (H) 01/31/2025    ALBUMIN 3.3 (L) 01/31/2025    BILIDIR 0.2 03/15/2022    IBILI 0.10 03/15/2022    AST 14 01/31/2025    ALT 12 01/31/2025    MG 1.90 07/29/2023    PHOS 3.5 08/22/2021    CAQNKDFH80PN 9 (L) 01/31/2025        Lab Results   Component Value Date    HGBA1C 6.3 10/04/2022        Hematology:  Lab Results   Component Value Date    WBC 9.16 01/31/2025    HGB 11.2 (L) 01/31/2025    HCT 35.6 (L) 01/31/2025     01/31/2025       Lipid Panel:  Lab Results   Component Value Date    CHOL 154 03/12/2024    HDL 34 (L) 03/12/2024    .00 03/12/2024    TRIG 84 03/12/2024    TOTALCHOLEST 5 03/12/2024        Urine:  Lab Results   Component Value Date    APPEARANCEUA Clear 01/31/2025    SGUA 1.013 01/31/2025    PROTEINUA Negative 01/31/2025    KETONESUA Negative 01/31/2025    LEUKOCYTESUR 75 (A) 01/31/2025    RBCUA 0-5 01/31/2025    WBCUA 11-20 (A) 01/31/2025    BACTERIA None Seen 01/31/2025    SQEPUA Few (A) 01/31/2025    HYALINECASTS None Seen 01/31/2025    DARLIN 71.4 08/29/2024        Assessment:       ICD-10-CM ICD-9-CM   1. Primary hypertension  I10 401.9   2. Other hyperlipidemia  E78.49 272.4   3. Obesity, unspecified class,  unspecified obesity type, unspecified whether serious comorbidity present  E66.9 278.00   4. Personal history of other venous thrombosis and embolism  Z86.718 V12.51   5. Well adult exam  Z00.00 V70.0   6. Frailty  R54 797   7. Vitamin D deficiency  E55.9 268.9   8. Chronic low back pain, unspecified back pain laterality, unspecified whether sciatica present  M54.50 724.2    G89.29 338.29   9. E-coli UTI  N39.0 599.0    B96.20 041.49   10. Screening for colon cancer  Z12.11 V76.51   11. Other iron deficiency anemia  D50.8 280.8   12. Insomnia, unspecified type  G47.00 780.52   13. SOB (shortness of breath)  R06.02 786.05   14. Post-COVID chronic dyspnea  R06.09 786.09    U09.9 139.8   15. Gastro-esophageal reflux disease without esophagitis  K21.9 530.81   16. Colon cancer screening  Z12.11 V76.51        Plan:     1. Primary hypertension (Primary)  BP controlled. Low fat low salt diet and exercise. Cont Amlodipine as prescribed.     2. Other hyperlipidemia  Low fat diet and exercise. Cont Simvastatin as prescribed.     3. Obesity, unspecified class, unspecified obesity type, unspecified whether serious comorbidity present  Encouraged low fat diet and exercise. Education provided.     4. Personal history of other venous thrombosis and embolism  Cont Xeralto as prescribed. ER precautions given.     5. Well adult exam  Labs in 3 months. Keep GYN appt. FIT test done 3-18-24. FIT test prior to next appt.     6. Frailty  Pt ambulating with rollator to prevent falls. Cont Rollator use as prescribed.     7. Vitamin D deficiency  Vit D level 9. RX Vit D 50,000 IU 1 cap po q weekly X 8 weeks. Once complete RX start Vit D3 2000 IU 1 cap po daily.     8. Chronic low back pain, unspecified back pain laterality, unspecified whether sciatica present  Pt followed by Pain mgmt. Pt needs to call and RS appt.     9. E-coli UTI  Urine culture showing ecoli. RX Macrobid 100 mg 1 tab po BID X 7 days. Drink plenty of water.     10.  Screening for colon cancer  FIT test in 3 months.     11. Other iron deficiency anemia  CBC stable. CBC and iron profile in 3 months. Cont iron medication as prescribed.      12. Insomnia  Pt states she has trouble sleeping since her grandson passed away and requesting medication for sleep. RX Trazodone 50 mg 1 tab po Q pm prn insomnia. Informed to cut tab in half and take 1/2 tab po Q hs if has lingering effect. Pt verbalized understanding.     Follow up in about 3 months (around 5/3/2025) for with labs 1 week prior to appt. . In addition to their scheduled follow up, the patient has also been instructed to follow up on as needed basis.     Future Appointments   Date Time Provider Department Center   2/5/2025 11:15 AM Ana Cristina Leiva PA-C [facility] [place]   2/17/2025 10:30 AM Corazon Zhu PA-C [facility] [place]   2/20/2025  8:20 AM Shauna Samson FNP [facility] [place]   10/7/2025  9:50 AM Lorena Agee ANP [facility] [place]        [facility] Iglesias

## 2025-02-05 ENCOUNTER — OFFICE VISIT (OUTPATIENT)
Dept: CARDIOLOGY | Facility: CLINIC | Age: 70
End: 2025-02-05
Payer: MEDICARE

## 2025-02-05 VITALS
WEIGHT: 268.94 LBS | HEART RATE: 82 BPM | BODY MASS INDEX: 43.22 KG/M2 | OXYGEN SATURATION: 96 % | TEMPERATURE: 98 F | SYSTOLIC BLOOD PRESSURE: 124 MMHG | RESPIRATION RATE: 18 BRPM | DIASTOLIC BLOOD PRESSURE: 60 MMHG | HEIGHT: 66 IN

## 2025-02-05 DIAGNOSIS — R06.00 PND (PAROXYSMAL NOCTURNAL DYSPNEA): Primary | ICD-10-CM

## 2025-02-05 DIAGNOSIS — R06.83 SNORING: ICD-10-CM

## 2025-02-05 DIAGNOSIS — G47.00 FREQUENT NOCTURNAL AWAKENING: ICD-10-CM

## 2025-02-05 DIAGNOSIS — I10 PRIMARY HYPERTENSION: Chronic | ICD-10-CM

## 2025-02-05 DIAGNOSIS — E78.49 OTHER HYPERLIPIDEMIA: Chronic | ICD-10-CM

## 2025-02-05 DIAGNOSIS — E66.9 OBESITY, UNSPECIFIED CLASS, UNSPECIFIED OBESITY TYPE, UNSPECIFIED WHETHER SERIOUS COMORBIDITY PRESENT: ICD-10-CM

## 2025-02-05 PROCEDURE — 3074F SYST BP LT 130 MM HG: CPT | Mod: CPTII,,,

## 2025-02-05 PROCEDURE — 99214 OFFICE O/P EST MOD 30 MIN: CPT | Mod: S$PBB,,,

## 2025-02-05 PROCEDURE — 1126F AMNT PAIN NOTED NONE PRSNT: CPT | Mod: CPTII,,,

## 2025-02-05 PROCEDURE — 3288F FALL RISK ASSESSMENT DOCD: CPT | Mod: CPTII,,,

## 2025-02-05 PROCEDURE — 1159F MED LIST DOCD IN RCRD: CPT | Mod: CPTII,,,

## 2025-02-05 PROCEDURE — 99215 OFFICE O/P EST HI 40 MIN: CPT | Mod: PBBFAC

## 2025-02-05 PROCEDURE — 1160F RVW MEDS BY RX/DR IN RCRD: CPT | Mod: CPTII,,,

## 2025-02-05 PROCEDURE — 3078F DIAST BP <80 MM HG: CPT | Mod: CPTII,,,

## 2025-02-05 PROCEDURE — 1101F PT FALLS ASSESS-DOCD LE1/YR: CPT | Mod: CPTII,,,

## 2025-02-05 PROCEDURE — 3008F BODY MASS INDEX DOCD: CPT | Mod: CPTII,,,

## 2025-02-05 NOTE — PATIENT INSTRUCTIONS
Labs prior next office visit   Follow up in cardiology clinic in 3 months or sooner if needed   Follow up with PCP as directed   Please notify clinic if any new concerns or any change in symptoms

## 2025-02-05 NOTE — PROGRESS NOTES
CHIEF COMPLAINT:   No chief complaint on file.                                                 HPI:  Leatha Martinez 69 y.o. female Medical history of hypertension, hyperlipidemia, obesity, history of venous thrombosis and embolism presents to Cardiology Clinic today for follow up and ongoing care.  Echocardiogram was ordered at that time which revealed normal systolic function, EF 72%, grade 1 diastolic dysfunction, overall and no further significant valvular structural disease.  See full report below.  At last office visit patient stated that she is feeling fairly well from a cardiac standpoint, her only complaint was stable SOB/SANTIAGO.    Today the patient states that she feels mostly stable from a cardiac standpoint.  She reports worsening SOB/SANTIAGO, but states that her inhaler has not been refilled and is not covered by insurance at this time.  She is working on obtaining a new inhaler.  This seems to greatly help her when she does have symptoms.  She also recently lost her grandson and states that this is taking a toll on her physically and emotionally.  She states that everything is worse since he has been gone.  So unclear if her symptoms today are exacerbated by her current emotional state.  Offered my condolences.  She states that occasionally she feels palpitations where she experiences her heart racing.  She states that this was particularly worse at the initial report of the news of her loss.  She occasionally has some chest discomfort, but again states that it has been exacerbated with her emotional state.  Otherwise she denies any further episodes of lightheadedness, dizziness, syncope, lower extremity edema, claudication symptoms, or near-syncope.  She continues to have some sleep apnea symptoms and was actually diagnosed with sleep apnea and put on a CPAP, but she is unable to tolerate.  She states that she is able to complete her ADLs without any ischemic symptoms, her main limitation is her physical  abilities.  She reports compliance with her current medications and is tolerating them well.  She is working with PCP/pharmacy to obtain a prior authorization for her inhaler to be approved.  She reports compliance with her Xarelto and is tolerating well without any adverse bleeding effects.  She denies any tobacco or other illicit drug use.                                                                                                                                                                      CARDIAC TESTING:  Echo 12.21.23    Left Ventricle: The left ventricle is normal in size. Mildly increased wall thickness. There is normal systolic function. Biplane (2D) method of discs ejection fraction is 72%. Grade I diastolic dysfunction.    Right Ventricle: Right ventricle was not well visualized due to poor acoustic window. Systolic function is normal.    Aortic Valve: There is mild aortic valve sclerosis. There is mild annular calcification present.    Tricuspid Valve: There is mild to moderate regurgitation.          Patient Active Problem List   Diagnosis    Hypertension    Gastro-esophageal reflux disease without esophagitis    Other hyperlipidemia    Class 3 severe obesity due to excess calories with serious comorbidity and body mass index (BMI) of 40.0 to 44.9 in adult    Personal history of other venous thrombosis and embolism    Personal history of COVID-19    Abnormal TSH    Leukocytosis    Post-COVID chronic dyspnea    Frailty    Iron deficiency anemia    Frequent nocturnal awakening    PND (paroxysmal nocturnal dyspnea)    Snoring    Well adult exam    H/O left breast biopsy    Chronic back pain    OA (osteoarthritis) of knee    Vitamin D deficiency    Obesity    E-coli UTI     Past Surgical History:   Procedure Laterality Date    COLONOSCOPY  04/14/2022    TONSILLECTOMY      TRANSFORAMINAL EPIDURAL INJECTION OF STEROID Bilateral 8/21/2024    Procedure: Injection,steroid,epidural,transforaminal  approach;  Surgeon: Yeny Adams MD;  Location: [facility] OR;  Service: Pain Management;  Laterality: Bilateral;  Bilateral TFESI L3     Social History     Socioeconomic History    Marital status: Single    Number of children: 3   Tobacco Use    Smoking status: Never     Passive exposure: Current    Smokeless tobacco: Never   Substance and Sexual Activity    Alcohol use: Never    Drug use: Never    Sexual activity: Not Currently     Partners: Male     Social Drivers of Health     Financial Resource Strain: Medium Risk (9/9/2024)    Overall Financial Resource Strain (CARDIA)     Difficulty of Paying Living Expenses: Somewhat hard   Food Insecurity: Food Insecurity Present (9/9/2024)    Hunger Vital Sign     Worried About Running Out of Food in the Last Year: Sometimes true     Ran Out of Food in the Last Year: Sometimes true   Transportation Needs: No Transportation Needs (11/7/2024)    TRANSPORTATION NEEDS     Transportation : No   Physical Activity: Inactive (9/10/2024)    Exercise Vital Sign     Days of Exercise per Week: 0 days     Minutes of Exercise per Session: 0 min   Stress: No Stress Concern Present (9/9/2024)    [employer] [place] [employer] - Occupational Stress Questionnaire     Feeling of Stress : Only a little   Housing Stability: High Risk (9/9/2024)    Housing Stability Vital Sign     Unable to Pay for Housing in the Last Year: Yes        Family History   Problem Relation Name Age of Onset    Heart attack Mother      Thyroid disease Mother      Diabetes Father      Thyroid disease Father      Heart attack Father       Review of patient's allergies indicates:   Allergen Reactions    Penicillins Hives    Aspirin Rash    Keflex [cephalexin] Rash    Tramadol Rash         ROS:  Review of Systems   Constitutional:  Positive for malaise/fatigue.   HENT: Negative.     Eyes: Negative.    Respiratory:  Positive for shortness of breath.    Cardiovascular:  Positive for PND. Negative for chest pain,  palpitations, orthopnea, claudication and leg swelling.   Gastrointestinal: Negative.    Genitourinary: Negative.    Musculoskeletal:  Positive for back pain, falls, joint pain and myalgias.   Skin: Negative.    Neurological: Negative.    Endo/Heme/Allergies: Negative.    Psychiatric/Behavioral:  Positive for depression. The patient is nervous/anxious.                                                                                                                                                                                 Negative except as stated in the history of present illness. See HPI for details.    PHYSICAL EXAM:  There were no vitals taken for this visit.      Physical Exam  Constitutional:       Appearance: Normal appearance. She is obese. She is not ill-appearing.   HENT:      Head: Normocephalic.      Nose: Nose normal.   Eyes:      Extraocular Movements: Extraocular movements intact.   Neck:      Vascular: No carotid bruit.   Cardiovascular:      Rate and Rhythm: Normal rate and regular rhythm.      Pulses: Normal pulses.      Heart sounds: Normal heart sounds. No murmur heard.  Pulmonary:      Effort: Pulmonary effort is normal.   Abdominal:      General: There is no distension.      Palpations: Abdomen is soft.   Musculoskeletal:         General: Normal range of motion.      Cervical back: Normal range of motion.      Right lower leg: No edema.      Left lower leg: No edema.   Skin:     General: Skin is warm.   Neurological:      General: No focal deficit present.      Mental Status: She is alert.   Psychiatric:         Mood and Affect: Mood normal.         Current Outpatient Medications   Medication Instructions    albuterol (PROVENTIL) 2.5 mg, Nebulization, Every 6 hours PRN, Rescue    albuterol (VENTOLIN HFA) 90 mcg/actuation inhaler 2 puffs, Inhalation, Every 6 hours PRN, Rescue    amLODIPine (NORVASC) 10 mg, Oral, Daily    cholecalciferol (vitamin D3) 50,000 Units, Oral, Weekly    DULoxetine  "(CYMBALTA) 30 mg, Oral, Daily    ferrous gluconate (FERGON) 324 mg, Oral, With breakfast    furosemide (LASIX) 20 mg, Oral, Daily    gabapentin (NEURONTIN) 300 mg, Oral, Nightly    methIMAzole (TAPAZOLE) 5 mg, 2 times daily    nitrofurantoin, macrocrystal-monohydrate, (MACROBID) 100 MG capsule 100 mg, Oral, 2 times daily    pantoprazole (PROTONIX) 40 mg, Oral, Daily    rivaroxaban (XARELTO) 20 mg, Oral, With dinner    simvastatin (ZOCOR) 20 MG tablet 1 tablet in the evening Orally Once a day for 30 day(s)    traZODone (DESYREL) 50 mg, Oral, Nightly PRN    walker Misc 1 each, Misc.(Non-Drug; Combo Route), Daily, Please assist pt with obtaining Rollator to assist with mobility.        All medications, laboratory studies, cardiac diagnostic imaging reviewed.     Lab Results   Component Value Date    .00 03/12/2024    LDL 86.00 03/31/2023    TRIG 84 03/12/2024    TRIG 93 03/31/2023    CREATININE 0.86 01/31/2025    MG 1.90 07/29/2023    K 4.7 01/31/2025        ASSESSMENT/PLAN:  Leatha Martinez is a 69 y.o. female with a PMH unprovoked subsegmental PE (2013), severe COVID, HTN, HLD, venous insufficiency, obesity here as a followup. PE reportedly occurred after a flight from [place]. She also states she has a family history of blood clots and lupus. She apparently had testing done for hypercoagulability previously.      - Endorses ongoing SOB with exertion. States that it is worsened from her last visit, but she has been without inhaler. She is working with PCP to obtain refills   - Recently lost her grandson and is going through grieving process. States that "everything is worse" since his passing. She is sad, emotional, and tearful on exam. Worsened palpitations during this time. Some chest discomfort, described atypical.  - SOB/SANTIAGO appears to be related to needing her inhaler   - Usually, no anginal or anginal equivalent symptoms. No congestive symptoms - will need to reassess at next visit. Consider stress test if " ongoing chest discomfort and SOB/SANTIAGO  - Echo revealed normal systolic function, EF 72%, G1DD, mild to mod TR, otherwise unremarkable. See full report above  - Continue Xarelto 20mg daily for prior PE.  Denies any adverse bleeding.   - Continue Amlodipine 10mg daily and Lasix 20mg PRN (patient does not take Lasix everyday)  -  per labs March 2024   - Increase Simvastatin to 40mg daily- done at last visit   - Repeat labs prior to next visit   - Counseled on the importance of following a low-sodium, low-cholesterol, low-fat diet and exercise as tolerated  - Encouraged weight loss  - Patient is endorsing PND, frequent nocturnal awakenings, snoring, and excessive daytime sleepiness- was diagnosed with sleep apnea but unable to tolerate CPAP      Labs prior next office visit   Follow up in cardiology clinic in 3 months or sooner if needed   Follow up with PCP as directed   Please notify clinic if any new concerns or any change in symptoms

## 2025-02-17 ENCOUNTER — HOSPITAL ENCOUNTER (OUTPATIENT)
Dept: RADIOLOGY | Facility: CLINIC | Age: 70
Discharge: HOME | End: 2025-02-17
Payer: MEDICARE

## 2025-02-17 ENCOUNTER — OFFICE VISIT (OUTPATIENT)
Dept: ORTHOPEDICS | Facility: CLINIC | Age: 70
End: 2025-02-17
Payer: MEDICARE

## 2025-02-17 DIAGNOSIS — M17.0 BILATERAL PRIMARY OSTEOARTHRITIS OF KNEE: Primary | ICD-10-CM

## 2025-02-17 DIAGNOSIS — M25.562 PAIN IN BOTH KNEES, UNSPECIFIED CHRONICITY: ICD-10-CM

## 2025-02-17 DIAGNOSIS — M25.561 PAIN IN BOTH KNEES, UNSPECIFIED CHRONICITY: ICD-10-CM

## 2025-02-17 DIAGNOSIS — M54.16 LUMBAR RADICULOPATHY: ICD-10-CM

## 2025-02-17 PROCEDURE — 1101F PT FALLS ASSESS-DOCD LE1/YR: CPT | Mod: CPTII,,,

## 2025-02-17 PROCEDURE — 99214 OFFICE O/P EST MOD 30 MIN: CPT | Mod: 25,,,

## 2025-02-17 PROCEDURE — 20610 DRAIN/INJ JOINT/BURSA W/O US: CPT | Mod: 50,,,

## 2025-02-17 PROCEDURE — 3288F FALL RISK ASSESSMENT DOCD: CPT | Mod: CPTII,,,

## 2025-02-17 RX ORDER — LIDOCAINE HYDROCHLORIDE 20 MG/ML
2 INJECTION, SOLUTION INFILTRATION; PERINEURAL
Status: DISCONTINUED | OUTPATIENT
Start: 2025-02-17 | End: 2025-02-17 | Stop reason: HOSPADM

## 2025-02-17 RX ORDER — METHYLPREDNISOLONE ACETATE 80 MG/ML
80 INJECTION, SUSPENSION INTRA-ARTICULAR; INTRALESIONAL; INTRAMUSCULAR; SOFT TISSUE
Status: DISCONTINUED | OUTPATIENT
Start: 2025-02-17 | End: 2025-02-17 | Stop reason: HOSPADM

## 2025-02-17 RX ADMIN — METHYLPREDNISOLONE ACETATE 80 MG: 80 INJECTION, SUSPENSION INTRA-ARTICULAR; INTRALESIONAL; INTRAMUSCULAR; SOFT TISSUE at 10:02

## 2025-02-17 RX ADMIN — LIDOCAINE HYDROCHLORIDE 2 MG: 20 INJECTION, SOLUTION INFILTRATION; PERINEURAL at 10:02

## 2025-02-17 NOTE — PROCEDURES
Large Joint Aspiration/Injection: bilateral knee    Date/Time: 2/17/2025 10:30 AM    Performed by: Corazon Zhu PA-C  Authorized by: Corazon Zhu PA-C    Consent Done?:  Yes (Verbal)  Indications:  Pain  Site marked: the procedure site was marked    Prep: patient was prepped and draped in usual sterile fashion    Approach:  Anterolateral  Location:  Knee  Laterality:  Bilateral  Site:  Bilateral knee  Medications (Right):  2 mg LIDOcaine HCL 20 mg/ml (2%) 20 mg/mL (2 %); 80 mg methylPREDNISolone acetate 80 mg/mL  Medications (Left):  2 mg LIDOcaine HCL 20 mg/ml (2%) 20 mg/mL (2 %); 80 mg methylPREDNISolone acetate 80 mg/mL  Patient tolerance:  Patient tolerated the procedure well with no immediate complications

## 2025-02-17 NOTE — PROGRESS NOTES
Subjective:    CC: Follow-up of the Left Knee and Follow-up of the Right Knee (F/U CASSIE knee pain. Pt states knees aren't doing good and have gotten worse. Has swelling. Has discoloration on knee and some rash-like bumps around knee. No new concerns with it.)       HPI:  Patient presents to clinic for repeat evaluation of bilateral knee pain.  She has a longstanding history of osteoarthritis which has previously done well to an oral Medrol Dosepak.  She states that her right knee is worse than her left today.  Ambulating using a Rollator.  Patient is on Xarelto and utilizes Tylenol when needed.  She also sees pain management for lumbar radiculopathy in his scheduled to have a nerve conduction study done; on gabapentin with pain management.  She previously received physical therapy for her lower extremity but states this has been stopped.  She has difficulty with long standing, walking getting up from a seated position.  Endorses radiating pain in the buttock region down the leg as well as intermittent numbness tingling.    ROS: Refer to HPI for pertinent ROS. All other 12 point systems negative.    Past Medical History:   Diagnosis Date    Asthma     Digestive disorder     Hyperlipidemia     Hypertension     Kidney hematoma     blood clot on left    Mass of left breast on mammogram     Personal history of colonic polyps 04/14/2022        Past Surgical History:   Procedure Laterality Date    COLONOSCOPY  04/14/2022    TONSILLECTOMY      TRANSFORAMINAL EPIDURAL INJECTION OF STEROID Bilateral 8/21/2024    Procedure: Injection,steroid,epidural,transforaminal approach;  Surgeon: Yeny Adams MD;  Location: [facility];  Service: Pain Management;  Laterality: Bilateral;  Bilateral TFESI L3        Medications Ordered Prior to Encounter[1]     Review of patient's allergies indicates:   Allergen Reactions    Penicillins Hives    Aspirin Rash    Keflex [cephalexin] Rash    Tramadol Rash       Objective:    There were no  vitals filed for this visit.     Physical Exam:  The patient is well-nourished, well-developed, in no apparent distress, pleasant and cooperative. Examination of the bilateral lower extremities,  compartments are soft and warm. Skin is intact. There are no signs or symptoms of DVT or infection. There is a mild bilateral joint effusion. There is no erythema. Tenderness to palpation along the anterior joint line as well as posterior knees, right knee range of motion is 5-100; left knee range of motion is 5-105. The knee is stable to stressing with varus and valgus. Negative anterior and posterior drawer.   Negative Lachman´s.  Negative Alex's test. Patella grind is positive, negative for apprehension.  Patient is neurovascularly intact distally.    Images:  X-rays: Four views of bilateral knees demonstrate tricompartmental osteoarthritis.  Bone-on-bone of the lateral compartment with osteophyte formation.  Kg grade 4.  No obvious fracture or dislocation.  Images Reviewed and discussed with patient.    Assessment:  1. Pain in both knees, unspecified chronicity  - X-Ray Knee Complete 4 Or More Views Bilat; Future    2. Bilateral primary osteoarthritis of knee  - Large Joint Aspiration/Injection: bilateral knee  - LIDOcaine HCL 20 mg/ml (2%) injection 2 mg  - LIDOcaine HCL 20 mg/ml (2%) injection 2 mg  - methylPREDNISolone acetate injection 80 mg  - methylPREDNISolone acetate injection 80 mg    3. Lumbar radiculopathy       Plan:  Physical exam and imaging findings discussed with patient.  Patient would most benefit from knee replacement however given her BMI she is not a candidate.  Believe she also needs to optimize her back prior to surgical intervention.  Today she tolerated bilateral knee steroid injections well today in clinic.  We have discussed prescription anti-inflammatories however given her blood thinner use we will hold off and she will continue to utilize Tylenol as needed with appropriate precautions.   Encouraged low-impact activity to preserve range of motion and strength.  I would like to see the patient back in 4 months to assess her progress.    Follow up: Follow up in about 4 months (around 6/17/2025).    Large Joint Aspiration/Injection: bilateral knee    Date/Time: 2/17/2025 10:30 AM    Performed by: Corazon Zhu PA-C  Authorized by: Corazon Zhu PA-C    Consent Done?:  Yes (Verbal)  Indications:  Pain  Site marked: the procedure site was marked    Prep: patient was prepped and draped in usual sterile fashion    Approach:  Anterolateral  Location:  Knee  Laterality:  Bilateral  Site:  Bilateral knee  Medications (Right):  2 mg LIDOcaine HCL 20 mg/ml (2%) 20 mg/mL (2 %); 80 mg methylPREDNISolone acetate 80 mg/mL  Medications (Left):  2 mg LIDOcaine HCL 20 mg/ml (2%) 20 mg/mL (2 %); 80 mg methylPREDNISolone acetate 80 mg/mL  Patient tolerance:  Patient tolerated the procedure well with no immediate complications              [1]   Current Outpatient Medications on File Prior to Visit   Medication Sig Dispense Refill    albuterol (PROVENTIL) 2.5 mg /3 mL (0.083 %) nebulizer solution Take 3 mLs (2.5 mg total) by nebulization every 6 (six) hours as needed for Wheezing or Shortness of Breath. Rescue 100 each 6    albuterol (VENTOLIN HFA) 90 mcg/actuation inhaler Inhale 2 puffs into the lungs every 6 (six) hours as needed for Wheezing. Rescue 18 g 3    amLODIPine (NORVASC) 10 MG tablet Take 1 tablet (10 mg total) by mouth once daily. 90 tablet 3    cholecalciferol, vitamin D3, 1,250 mcg (50,000 unit) capsule Take 1 capsule (50,000 Units total) by mouth once a week. for 8 doses 8 capsule 0    DULoxetine (CYMBALTA) 30 MG capsule Take 1 capsule (30 mg total) by mouth once daily. 30 capsule 11    ferrous gluconate (FERGON) 324 MG tablet Take 1 tablet (324 mg total) by mouth daily with breakfast. (Patient not taking: Reported on 2/5/2025) 90 tablet 2    furosemide (LASIX) 20 MG tablet Take 1 tablet (20  mg total) by mouth once daily. 90 tablet 2    gabapentin (NEURONTIN) 300 MG capsule Take 1 capsule (300 mg total) by mouth every evening. 90 capsule 1    methIMAzole (TAPAZOLE) 5 MG Tab Take 5 mg by mouth 2 (two) times daily.      pantoprazole (PROTONIX) 40 MG tablet Take 1 tablet (40 mg total) by mouth once daily. 90 tablet 1    rivaroxaban (XARELTO) 20 mg Tab Take 1 tablet (20 mg total) by mouth daily with dinner or evening meal. 90 tablet 3    simvastatin (ZOCOR) 20 MG tablet 1 tablet in the evening Orally Once a day for 30 day(s)      traZODone (DESYREL) 50 MG tablet Take 1 tablet (50 mg total) by mouth nightly as needed for Insomnia. 30 tablet 4    walker Misc 1 each by Misc.(Non-Drug; Combo Route) route once daily. Please assist pt with obtaining Rollator to assist with mobility. 1 each 0     No current facility-administered medications on file prior to visit.

## 2025-05-26 ENCOUNTER — LAB VISIT (OUTPATIENT)
Dept: LAB | Facility: HOSPITAL | Age: 70
End: 2025-05-26
Attending: NURSE PRACTITIONER
Payer: MEDICARE

## 2025-05-26 ENCOUNTER — OFFICE VISIT (OUTPATIENT)
Dept: INTERNAL MEDICINE | Facility: CLINIC | Age: 70
End: 2025-05-26
Payer: MEDICARE

## 2025-05-26 VITALS
WEIGHT: 293 LBS | DIASTOLIC BLOOD PRESSURE: 85 MMHG | SYSTOLIC BLOOD PRESSURE: 169 MMHG | HEIGHT: 66 IN | BODY MASS INDEX: 47.09 KG/M2 | RESPIRATION RATE: 16 BRPM | TEMPERATURE: 98 F | HEART RATE: 88 BPM

## 2025-05-26 DIAGNOSIS — E66.01 CLASS 3 SEVERE OBESITY DUE TO EXCESS CALORIES WITH SERIOUS COMORBIDITY AND BODY MASS INDEX (BMI) OF 40.0 TO 44.9 IN ADULT: Chronic | ICD-10-CM

## 2025-05-26 DIAGNOSIS — Z12.11 SCREENING FOR COLON CANCER: ICD-10-CM

## 2025-05-26 DIAGNOSIS — D50.8 OTHER IRON DEFICIENCY ANEMIA: ICD-10-CM

## 2025-05-26 DIAGNOSIS — K21.9 GASTRO-ESOPHAGEAL REFLUX DISEASE WITHOUT ESOPHAGITIS: Chronic | ICD-10-CM

## 2025-05-26 DIAGNOSIS — Z00.00 WELL ADULT EXAM: ICD-10-CM

## 2025-05-26 DIAGNOSIS — U09.9 POST-COVID CHRONIC DYSPNEA: ICD-10-CM

## 2025-05-26 DIAGNOSIS — E66.813 CLASS 3 SEVERE OBESITY DUE TO EXCESS CALORIES WITH SERIOUS COMORBIDITY AND BODY MASS INDEX (BMI) OF 40.0 TO 44.9 IN ADULT: Chronic | ICD-10-CM

## 2025-05-26 DIAGNOSIS — R54 FRAILTY: Chronic | ICD-10-CM

## 2025-05-26 DIAGNOSIS — G47.00 INSOMNIA, UNSPECIFIED TYPE: ICD-10-CM

## 2025-05-26 DIAGNOSIS — Z86.718 PERSONAL HISTORY OF OTHER VENOUS THROMBOSIS AND EMBOLISM: Chronic | ICD-10-CM

## 2025-05-26 DIAGNOSIS — G89.29 CHRONIC LOW BACK PAIN, UNSPECIFIED BACK PAIN LATERALITY, UNSPECIFIED WHETHER SCIATICA PRESENT: ICD-10-CM

## 2025-05-26 DIAGNOSIS — I10 PRIMARY HYPERTENSION: Primary | Chronic | ICD-10-CM

## 2025-05-26 DIAGNOSIS — M54.50 CHRONIC LOW BACK PAIN, UNSPECIFIED BACK PAIN LATERALITY, UNSPECIFIED WHETHER SCIATICA PRESENT: ICD-10-CM

## 2025-05-26 DIAGNOSIS — E55.9 VITAMIN D DEFICIENCY: ICD-10-CM

## 2025-05-26 DIAGNOSIS — R06.09 POST-COVID CHRONIC DYSPNEA: ICD-10-CM

## 2025-05-26 DIAGNOSIS — I10 PRIMARY HYPERTENSION: Chronic | ICD-10-CM

## 2025-05-26 DIAGNOSIS — E78.49 OTHER HYPERLIPIDEMIA: Chronic | ICD-10-CM

## 2025-05-26 DIAGNOSIS — D50.8 OTHER IRON DEFICIENCY ANEMIA: Chronic | ICD-10-CM

## 2025-05-26 LAB
ALBUMIN SERPL-MCNC: 3.2 G/DL (ref 3.4–4.8)
ALBUMIN/GLOB SERPL: 0.7 RATIO (ref 1.1–2)
ALP SERPL-CCNC: 126 UNIT/L (ref 40–150)
ALT SERPL-CCNC: 11 UNIT/L (ref 0–55)
ANION GAP SERPL CALC-SCNC: 7 MEQ/L
AST SERPL-CCNC: 16 UNIT/L (ref 11–45)
BASOPHILS # BLD AUTO: 0.02 X10(3)/MCL
BASOPHILS NFR BLD AUTO: 0.3 %
BILIRUB SERPL-MCNC: 0.2 MG/DL
BUN SERPL-MCNC: 18.7 MG/DL (ref 9.8–20.1)
CALCIUM SERPL-MCNC: 8.5 MG/DL (ref 8.4–10.2)
CHLORIDE SERPL-SCNC: 111 MMOL/L (ref 98–107)
CHOLEST SERPL-MCNC: 151 MG/DL
CHOLEST/HDLC SERPL: 4 {RATIO} (ref 0–5)
CO2 SERPL-SCNC: 25 MMOL/L (ref 23–31)
CREAT SERPL-MCNC: 0.83 MG/DL (ref 0.55–1.02)
CREAT/UREA NIT SERPL: 23
EOSINOPHIL # BLD AUTO: 0.28 X10(3)/MCL (ref 0–0.9)
EOSINOPHIL NFR BLD AUTO: 3.8 %
ERYTHROCYTE [DISTWIDTH] IN BLOOD BY AUTOMATED COUNT: 14.4 % (ref 11.5–17)
GFR SERPLBLD CREATININE-BSD FMLA CKD-EPI: >60 ML/MIN/1.73/M2
GLOBULIN SER-MCNC: 4.8 GM/DL (ref 2.4–3.5)
GLUCOSE SERPL-MCNC: 105 MG/DL (ref 82–115)
HCT VFR BLD AUTO: 32.2 % (ref 37–47)
HDLC SERPL-MCNC: 38 MG/DL (ref 35–60)
HGB BLD-MCNC: 10.1 G/DL (ref 12–16)
IMM GRANULOCYTES # BLD AUTO: 0.03 X10(3)/MCL (ref 0–0.04)
IMM GRANULOCYTES NFR BLD AUTO: 0.4 %
LDLC SERPL CALC-MCNC: 93 MG/DL (ref 50–140)
LYMPHOCYTES # BLD AUTO: 2.02 X10(3)/MCL (ref 0.6–4.6)
LYMPHOCYTES NFR BLD AUTO: 27.3 %
MCH RBC QN AUTO: 25.3 PG (ref 27–31)
MCHC RBC AUTO-ENTMCNC: 31.4 G/DL (ref 33–36)
MCV RBC AUTO: 80.7 FL (ref 80–94)
MONOCYTES # BLD AUTO: 0.67 X10(3)/MCL (ref 0.1–1.3)
MONOCYTES NFR BLD AUTO: 9 %
NEUTROPHILS # BLD AUTO: 4.39 X10(3)/MCL (ref 2.1–9.2)
NEUTROPHILS NFR BLD AUTO: 59.2 %
NRBC BLD AUTO-RTO: 0 %
PLATELET # BLD AUTO: 264 X10(3)/MCL (ref 130–400)
PMV BLD AUTO: 10.3 FL (ref 7.4–10.4)
POTASSIUM SERPL-SCNC: 4.6 MMOL/L (ref 3.5–5.1)
PROT SERPL-MCNC: 8 GM/DL (ref 5.8–7.6)
RBC # BLD AUTO: 3.99 X10(6)/MCL (ref 4.2–5.4)
SODIUM SERPL-SCNC: 143 MMOL/L (ref 136–145)
TRIGL SERPL-MCNC: 100 MG/DL (ref 37–140)
VLDLC SERPL CALC-MCNC: 20 MG/DL
WBC # BLD AUTO: 7.41 X10(3)/MCL (ref 4.5–11.5)

## 2025-05-26 PROCEDURE — 1159F MED LIST DOCD IN RCRD: CPT | Mod: CPTII,,, | Performed by: NURSE PRACTITIONER

## 2025-05-26 PROCEDURE — 3288F FALL RISK ASSESSMENT DOCD: CPT | Mod: CPTII,,, | Performed by: NURSE PRACTITIONER

## 2025-05-26 PROCEDURE — 80061 LIPID PANEL: CPT

## 2025-05-26 PROCEDURE — 85025 COMPLETE CBC W/AUTO DIFF WBC: CPT

## 2025-05-26 PROCEDURE — 1160F RVW MEDS BY RX/DR IN RCRD: CPT | Mod: CPTII,,, | Performed by: NURSE PRACTITIONER

## 2025-05-26 PROCEDURE — 80053 COMPREHEN METABOLIC PANEL: CPT

## 2025-05-26 PROCEDURE — 1100F PTFALLS ASSESS-DOCD GE2>/YR: CPT | Mod: CPTII,,, | Performed by: NURSE PRACTITIONER

## 2025-05-26 PROCEDURE — 3077F SYST BP >= 140 MM HG: CPT | Mod: CPTII,,, | Performed by: NURSE PRACTITIONER

## 2025-05-26 PROCEDURE — 36415 COLL VENOUS BLD VENIPUNCTURE: CPT

## 2025-05-26 PROCEDURE — 3079F DIAST BP 80-89 MM HG: CPT | Mod: CPTII,,, | Performed by: NURSE PRACTITIONER

## 2025-05-26 PROCEDURE — 1125F AMNT PAIN NOTED PAIN PRSNT: CPT | Mod: CPTII,,, | Performed by: NURSE PRACTITIONER

## 2025-05-26 PROCEDURE — 99214 OFFICE O/P EST MOD 30 MIN: CPT | Mod: PBBFAC | Performed by: NURSE PRACTITIONER

## 2025-05-26 PROCEDURE — 99214 OFFICE O/P EST MOD 30 MIN: CPT | Mod: S$PBB,,, | Performed by: NURSE PRACTITIONER

## 2025-05-26 PROCEDURE — 3008F BODY MASS INDEX DOCD: CPT | Mod: CPTII,,, | Performed by: NURSE PRACTITIONER

## 2025-05-26 RX ORDER — TRAZODONE HYDROCHLORIDE 50 MG/1
50 TABLET ORAL NIGHTLY PRN
Qty: 30 TABLET | Refills: 4 | Status: SHIPPED | OUTPATIENT
Start: 2025-05-26 | End: 2026-05-26

## 2025-05-26 RX ORDER — PANTOPRAZOLE SODIUM 40 MG/1
40 TABLET, DELAYED RELEASE ORAL DAILY
Qty: 90 TABLET | Refills: 1 | Status: SHIPPED | OUTPATIENT
Start: 2025-05-26

## 2025-05-26 RX ORDER — GABAPENTIN 400 MG/1
400 CAPSULE ORAL NIGHTLY
Qty: 90 CAPSULE | Refills: 1 | Status: SHIPPED | OUTPATIENT
Start: 2025-05-26

## 2025-05-26 RX ORDER — ALBUTEROL SULFATE 90 UG/1
2 INHALANT RESPIRATORY (INHALATION) EVERY 6 HOURS PRN
Qty: 18 G | Refills: 3 | Status: SHIPPED | OUTPATIENT
Start: 2025-05-26 | End: 2026-05-26

## 2025-05-26 NOTE — PROGRESS NOTES
Patient ID: [id number]     Chief Complaint: Hypertension (F/u)        HPI:     VAN Martinez is a 70 y.o. female here today for a follow up.         Optometrist:  Dentist:  Breast Cancer Screening:  [unfilled]   Cervical Cancer Screening:     Colorectal Cancer Screening:  Diabetic Eye Exam:  Diabetic Foot Exam:  Lung Cancer Screening:    Prostate Cancer Screening:  AAA Screening:  Osteoporosis Screening:  Medicare Wellness:   Immunizations:   Immunization History   Administered Date(s) Administered    Influenza (FLUAD) - Quadrivalent - Adjuvanted - PF *Preferred* (65+) 10/04/2022, 09/26/2023    Tdap 05/22/2017        -------------------------------------    Asthma    Digestive disorder    Hyperlipidemia    Hypertension    Kidney hematoma    blood clot on left    Mass of left breast on mammogram    Personal history of colonic polyps        Past Surgical History:   Procedure Laterality Date    COLONOSCOPY  04/14/2022    TONSILLECTOMY      TRANSFORAMINAL EPIDURAL INJECTION OF STEROID Bilateral 8/21/2024    Procedure: Injection,steroid,epidural,transforaminal approach;  Surgeon: Yeny Adams MD;  Location: [facility];  Service: Pain Management;  Laterality: Bilateral;  Bilateral TFESI L3       Review of patient's allergies indicates:   Allergen Reactions    Penicillins Hives    Aspirin Rash    Keflex [cephalexin] Rash    Tramadol Rash       Current Outpatient Medications   Medication Instructions    albuterol (PROVENTIL) 2.5 mg, Nebulization, Every 6 hours PRN, Rescue    albuterol (VENTOLIN HFA) 90 mcg/actuation inhaler 2 puffs, Inhalation, Every 6 hours PRN, Rescue    amLODIPine (NORVASC) 10 mg, Oral, Daily    DULoxetine (CYMBALTA) 30 mg, Oral, Daily    ferrous gluconate (FERGON) 324 mg, Oral, With breakfast    furosemide (LASIX) 20 mg, Oral, Daily    gabapentin (NEURONTIN) 400 mg, Oral, Nightly    methIMAzole (TAPAZOLE) 5 mg, 2 times daily    pantoprazole (PROTONIX) 40 mg, Oral, Daily    rivaroxaban  (XARELTO) 20 mg, Oral, With dinner    simvastatin (ZOCOR) 20 MG tablet 1 tablet in the evening Orally Once a day for 30 day(s)    traZODone (DESYREL) 50 mg, Oral, Nightly PRN    walker Misc 1 each, Misc.(Non-Drug; Combo Route), Daily, Please assist pt with obtaining Rollator to assist with mobility.       Social History[1]     Family History   Problem Relation Name Age of Onset    Heart attack Mother      Thyroid disease Mother      Diabetes Father      Thyroid disease Father      Heart attack Father          Patient Care Team:  Shauna Samson FNP as PCP - General (Family Medicine)     Subjective:     Review of Systems     See HPI for details    Constitutional: Denies Change in appetite. Denies Chills. Denies Fever. Denies Night sweats.  Eye: Denies Blurred vision. Denies Discharge. Denies Eye pain.  ENT: Denies Decreased hearing. Denies Sore throat. Denies Swollen glands.  Respiratory: Denies Cough. Denies Shortness of breath. Denies Shortness of breath with exertion. Denies Wheezing.  Cardiovascular: DeniesChest pain at rest. Denies Chest pain with exertion. Denies Irregular heartbeat. Denies Palpitations. Denies Edema.  Gastrointestinal: Denies Abdominal pain. DeniesDiarrhea. Denies Nausea. Denies Vomiting. Denies Hematemesis or Hematochezia.  Genitourinary: Denies Dysuria. Denies Urinary frequency. Denies Urinary urgency. Denies Blood in urine.  Endocrine: Denies Cold intolerance. Denies Excessive thirst. Denies Heat intolerance. Denies Weight loss. Denies Weight gain.  Musculoskeletal: Denies Painful joints. Denies Weakness.  Integumentary: Denies Rash. Denies Itching. Denies Dry skin.  Neurologic: Denies Dizziness. Denies Fainting. Denies Headache.  Psychiatric: Denies Depression. Denies Anxiety. Denies Suicidal/Homicidal ideations.    All Other ROS: Negative except as stated in HPI.       Objective:     Visit Vitals  BP (!) 169/85 (BP Location: Left arm, Patient Position: Sitting)   Pulse 88   Temp 97.8  "°F (36.6 °C)   Resp 16   Ht 5' 6" (1.676 m)   Wt 133.5 kg (294 lb 6.4 oz)   BMI 47.52 kg/m²       Physical Exam    General: Alert and oriented, No acute distress.  Head: Normocephalic, Atraumatic.  Eye: Pupils are equal, round and reactive to light, Extraocular movements are intact, Sclera non-icteric.  Ears/Nose/Throat: Normal, Mucosa moist,Clear.  Neck/Thyroid: Supple, Non-tender, No carotid bruit, No lymphadenopathy, No JVD, Full range of motion.  Respiratory: Clear to auscultation bilaterally; No wheezes, rales or rhonchi,Non-labored respirations, Symmetrical chest wall expansion.  Cardiovascular: Regular rate and rhythm, S1/S2 normal, No murmurs, rubs or gallops.  Gastrointestinal: Soft, Non-tender, Non-distended, Normal bowel sounds, No palpable organomegaly.  Musculoskeletal: Normal range of motion.  Integumentary: Warm, Dry, Intact, No suspicious lesions or rashes.  Extremities: No clubbing, cyanosis or edema  Neurologic: No focal deficits, Cranial Nerves II-XII are grossly intact, Motor strength normal upper and lower extremities, Sensory exam intact.  Psychiatric: Normal interaction, Coherent speech, Euthymic mood, Appropriate affect       Labs Reviewed:     Chemistry:  Lab Results   Component Value Date     05/26/2025    K 4.6 05/26/2025    BUN 18.7 05/26/2025    CREATININE 0.83 05/26/2025    EGFRNORACEVR >60 05/26/2025    CALCIUM 8.5 05/26/2025    ALKPHOS 126 05/26/2025    ALBUMIN 3.2 (L) 05/26/2025    BILIDIR 0.2 03/15/2022    IBILI 0.10 03/15/2022    AST 16 05/26/2025    ALT 11 05/26/2025    MG 1.90 07/29/2023    PHOS 3.5 08/22/2021    VHNMFBFH94RA 9 (L) 01/31/2025        Lab Results   Component Value Date    HGBA1C 6.3 10/04/2022        Hematology:  Lab Results   Component Value Date    WBC 7.41 05/26/2025    HGB 10.1 (L) 05/26/2025    HCT 32.2 (L) 05/26/2025     05/26/2025       Lipid Panel:  Lab Results   Component Value Date    CHOL 151 05/26/2025    HDL 38 05/26/2025    LDL 93.00 " 05/26/2025    TRIG 100 05/26/2025    TOTALCHOLEST 4 05/26/2025        Urine:  Lab Results   Component Value Date    APPEARANCEUA Clear 01/31/2025    SGUA 1.013 01/31/2025    PROTEINUA Negative 01/31/2025    KETONESUA Negative 01/31/2025    LEUKOCYTESUR 75 (A) 01/31/2025    RBCUA 0-5 01/31/2025    WBCUA 11-20 (A) 01/31/2025    BACTERIA None Seen 01/31/2025    SQEPUA Few (A) 01/31/2025    HYALINECASTS None Seen 01/31/2025    CREATRANDUR 71.4 08/29/2024        Assessment:       ICD-10-CM ICD-9-CM   1. Primary hypertension  I10 401.9   2. Other hyperlipidemia  E78.49 272.4   3. Class 3 severe obesity due to excess calories with serious comorbidity and body mass index (BMI) of 40.0 to 44.9 in adult  E66.813 278.01    E66.01 V85.41    Z68.41    4. Personal history of other venous thrombosis and embolism  Z86.718 V12.51   5. Well adult exam  Z00.00 V70.0   6. Frailty  R54 797   7. Vitamin D deficiency  E55.9 268.9   8. Chronic low back pain, unspecified back pain laterality, unspecified whether sciatica present  M54.50 724.2    G89.29 338.29   9. Other iron deficiency anemia  D50.8 280.8   10. Post-COVID chronic dyspnea  R06.09 786.09    U09.9 139.8   11. Gastro-esophageal reflux disease without esophagitis  K21.9 530.81   12. Insomnia, unspecified type  G47.00 780.52   13. Screening for colon cancer  Z12.11 V76.51        Plan:     1. Primary hypertension (Primary)  Low fat diet and exercise.     2. Other hyperlipidemia  Low fat diet and exercise. Education provided.     3. Class 3 severe obesity due to excess calories with serious comorbidity and body mass index (BMI) of 40.0 to 44.9 in adult  BMI     4. Personal history of other venous thrombosis and embolism  Cont Xarelto as prescribed.     5. Well adult exam  Labs in 4 months. Keep GYN cl appt. Keep MMG, Pt had FIT Test done but place in bag and forgot about it and he     6. Frailty  Encouraged to drink plenty of water. Will monitor.     7. Vitamin D deficiency  Vit D  level in 4 months. Encouraged Vit D 3 2000 IU 1 tab po daily.     8. Chronic low back pain, unspecified back pain laterality, unspecified whether sciatica present  Cont Gabapentin as prescribed. Pt request all prescriptions to be     9. Other iron deficiency anemia  CBC and iron profile in 4 months.          Follow up in about 1 month (around 6/26/2025). In addition to their scheduled follow up, the patient has also been instructed to follow up on as needed basis.     Future Appointments   Date Time Provider Department Center   6/5/2025 10:45 AM [facility] MAMMO2 [facility] MAMMO [place]    6/19/2025  9:45 AM Corazon Zhu, PATYLOR [facility] MOBORT [place]   6/25/2025 10:30 AM Ramandeep Machuca MD [facility] CARD [place]    10/7/2025  9:50 AM Lorena Agee ANP [facility] GYN [place]         RENU Iglesias             [1]   Social History  Socioeconomic History    Marital status: Single    Number of children: 3   Tobacco Use    Smoking status: Never     Passive exposure: Current    Smokeless tobacco: Never   Substance and Sexual Activity    Alcohol use: Never    Drug use: Never    Sexual activity: Not Currently     Partners: Male     Social Drivers of Health     Financial Resource Strain: Medium Risk (9/9/2024)    Overall Financial Resource Strain (CARDIA)     Difficulty of Paying Living Expenses: Somewhat hard   Food Insecurity: Food Insecurity Present (9/9/2024)    Hunger Vital Sign     Worried About Running Out of Food in the Last Year: Sometimes true     Ran Out of Food in the Last Year: Sometimes true   Transportation Needs: High Risk (1/1/2025)    Received from [facility] SDOH Screening     Has lack of transportation kept you from medical appointments, meetings, work or from getting things needed for daily living? choose all that apply.: Yes, it has kept me from medical appointments or from getting my medications     Has lack of transportation kept you from medical appointments, meetings, work or  from getting things needed for daily living? choose all that apply.: Yes, it has kept me from non-medical meetings, appointments, work or from getting things that i need   Physical Activity: Inactive (9/10/2024)    Exercise Vital Sign     Days of Exercise per Week: 0 days     Minutes of Exercise per Session: 0 min   Stress: No Stress Concern Present (9/9/2024)    [employer] - Occupational Stress Questionnaire     Feeling of Stress : Only a little   Housing Stability: High Risk (9/9/2024)    Housing Stability Vital Sign     Unable to Pay for Housing in the Last Year: Yes

## 2025-05-27 NOTE — HISTORICAL [facility] CERNER
This is a historical note converted from [employer]. Formatting and pictures may have been removed.  Please reference [employer] for original formatting and attached multimedia. Chief Complaint  Colonoscopy  History of Present Illness  66F PMHx HLD, HTN presenting for colonoscopy. Patient presents today for colonoscopy. She tolerated bowel prep in entirety and is having clear/yellow stools. Denies any abdominal pain, change in bowel or bladder habits, BRBPR, weight loss, fevers/chills.  ?   No prior colonoscopy  No known family history of colon cancer  Review of Systems  12 point review of systems negative unless otherwise stated  Physical Exam  Vitals & Measurements  T:?36.7? ?C (Oral)? HR:?83(Monitored)? RR:?32? BP:?168/82? SpO2:?98%? WT:?136.8?kg? BMI:?48.68?  General:?NAD. Laying in bed comfortably.  Eye: clear conjunctiva, eyelids normal  HENT:?Trachea midline. Normocephalic. Atraumatic  Respiratory:?Symmetric chest expansion. Nonlabored breathing.  Cardiovascular:?regular rate  Gastrointestinal:?soft, non-tender, non-distended, no masses  Genitourinary: no CVA tenderness  Musculoskeletal:?full range of motion of all extremities  Integumentary: no rashes or skin lesions present  Neurologic: Cranial nerves grossly intact  ?  Assessment/Plan  66F presenting for colonoscopy  ?  ?  RBA of colonoscopy discussed with patient and written consent obtained  Plan for colonoscopy with Dr. Sanabria  ?  ?  ?  Greta Justin MD  PGY2 General Surgery?   Problem List/Past Medical History  Ongoing  Arthritis  Deep vein thrombosis (DVT)  Degenerative disk disease  HLD (hyperlipidemia)  HTN (hypertension)  HTN - Hypertension  Morbid obesity  Pain  Historical  No qualifying data  Procedure/Surgical History  Introduction of Remdesivir Anti-infective into Peripheral Vein, Percutaneous Approach, [employer] (08/09/2021)  Repair Face Subcutaneous Tissue and Fascia, Open Approach (02/10/2021)  Simple repair of superficial wounds of  face, ears, eyelids, nose, lips and/or mucous membranes; 12.6 cm to 20.0 cm (02/10/2021)  Dressing of Right Hand using Bandage (05/22/2017)  Initial treatment, first degree burn, when no more than local treatment is required (05/22/2017)  Tonsillectomy   Medications  Inpatient  buffered lidocaine 2% - 0.5 ml syringe, 10 mg= 0.5 mL, Subcutaneous, As Directed  DuoNeb 0.5 mg-2.5 mg/3 mL inhalation solution, 0.5 mg= 3 mL, NEB, Once  IVF Lactated Ringers LR Infusion 1,000 mL, 1000 mL, IV  lidocaine 1% injectable solution, 20 mL, EPI, Once  Home  albuterol 2.5 mg/3 mL (0.083%) inhalation solution, 2.5 mg= 3 mL, NEB, q6hr, 2 refills  citalopram 40 mg oral tablet, 40 mg= 1 tab(s), Oral, Daily  furosemide 20 mg oral tablet, 20 mg= 1 tab(s), Oral, Daily, 1 refills  Nebulizer Machine, See Instructions  Norvasc 10 mg oral tablet, 10 mg= 1 tab(s), Oral, Daily, 1 refills  Pantoprazole 40 mg ORAL EC-Tablet, 40 mg= 1 tab(s), Oral, Daily, 1 refills  ProAir HFA 90 mcg/inh inhalation aerosol with adapter, 2 puff(s), INH, q6hr, PRN, 2 refills  rollator with seat, See Instructions  simvastatin 20 mg oral tablet, 20 mg= 1 tab(s), Oral, Once a day (at bedtime), 1 refills  Vitamin D 50,000 intl units oral capsule, 89629 IntUnit= 1 cap(s), Oral, qWeek  Xarelto 20mg Tablet, 20 mg= 1 tab(s), Oral, qPM, 1 refills  Allergies  aspirin  penicillins  traMADol  Social History  Abuse/Neglect  No, No, Yes, 04/14/2022  Alcohol - Denies Alcohol Use, 05/19/2012  Never, 08/31/2018  Employment/School - Not employed or in school, 02/06/2013  disability, Work/School description: disabled. Highest education level: High school. Operates hazardous equipment: No., 12/09/2016  Exercise - Does not exercise, 02/06/2013  Self assessment: Fair condition., 12/07/2015  Financial/Legal Situation  None, None, 09/21/2021  Home/Environment - No Risk, 02/06/2013  Lives with Alone. Living situation: Home/Independent. Alcohol abuse in household: No. Substance abuse in  Med Rec - Admission household: No. Smoker in household: No. Injuries/Abuse/Neglect in household: No. Feels unsafe at home: No. Safe place to go: Yes. Family/Friends available for support: Yes. Concern for family members at home: No. Major illness in household: No. Financial concerns: No. TV/Computer concerns: No., 12/09/2016    Never in , 09/21/2021  Nutrition/Health - No Risk, 02/06/2013  Diabetic, 12/07/2015  Sexual - No Risk, 02/06/2013  Sexually active: No. No, 09/21/2021  Spiritual/Cultural  Christian, 09/21/2021  Substance Use - Denies Substance Abuse, 02/06/2013  Never, 12/07/2015  Tobacco - Denies Tobacco Use, 05/19/2012  Never (less than 100 in lifetime), N/A, 04/14/2022  Family History  Acute myocardial infarction.: Mother.  Hypertension.: Mother and Father.  Immunizations  Vaccine Date Status Comments   zoster vaccine, inactivated 04/04/2022 Given    pneumococcal 13-valent conjugate vaccine 03/04/2022 Given    influenza virus vaccine, inactivated 03/04/2022 Given    tetanus/diphtheria/pertussis, acel(Tdap) 05/22/2017 Given other (see comment)

## 2025-06-05 ENCOUNTER — HOSPITAL ENCOUNTER (OUTPATIENT)
Dept: RADIOLOGY | Facility: HOSPITAL | Age: 70
Discharge: HOME | End: 2025-06-05
Attending: NURSE PRACTITIONER
Payer: MEDICARE

## 2025-06-05 DIAGNOSIS — Z12.31 ENCOUNTER FOR SCREENING MAMMOGRAM FOR MALIGNANT NEOPLASM OF BREAST: ICD-10-CM

## 2025-06-05 PROCEDURE — 77063 BREAST TOMOSYNTHESIS BI: CPT | Mod: TC

## 2025-06-16 ENCOUNTER — PATIENT OUTREACH (OUTPATIENT)
Facility: CLINIC | Age: 70
End: 2025-06-16
Payer: MEDICARE

## 2025-06-18 ENCOUNTER — PATIENT MESSAGE (OUTPATIENT)
Dept: INTERNAL MEDICINE | Facility: CLINIC | Age: 70
End: 2025-06-18
Payer: MEDICARE

## 2025-06-19 ENCOUNTER — OFFICE VISIT (OUTPATIENT)
Dept: ORTHOPEDICS | Facility: CLINIC | Age: 70
End: 2025-06-19
Payer: MEDICARE

## 2025-06-19 VITALS
HEIGHT: 66 IN | HEART RATE: 86 BPM | BODY MASS INDEX: 47.09 KG/M2 | WEIGHT: 293 LBS | DIASTOLIC BLOOD PRESSURE: 77 MMHG | SYSTOLIC BLOOD PRESSURE: 150 MMHG

## 2025-06-19 DIAGNOSIS — M17.0 BILATERAL PRIMARY OSTEOARTHRITIS OF KNEE: Primary | ICD-10-CM

## 2025-06-19 RX ORDER — METHYLPREDNISOLONE ACETATE 80 MG/ML
80 INJECTION, SUSPENSION INTRA-ARTICULAR; INTRALESIONAL; INTRAMUSCULAR; SOFT TISSUE
Status: DISCONTINUED | OUTPATIENT
Start: 2025-06-19 | End: 2025-06-19 | Stop reason: HOSPADM

## 2025-06-19 RX ORDER — LIDOCAINE HYDROCHLORIDE 20 MG/ML
2 INJECTION, SOLUTION INFILTRATION; PERINEURAL
Status: DISCONTINUED | OUTPATIENT
Start: 2025-06-19 | End: 2025-06-19 | Stop reason: HOSPADM

## 2025-06-19 RX ADMIN — LIDOCAINE HYDROCHLORIDE 2 MG: 20 INJECTION, SOLUTION INFILTRATION; PERINEURAL at 09:06

## 2025-06-19 RX ADMIN — METHYLPREDNISOLONE ACETATE 80 MG: 80 INJECTION, SUSPENSION INTRA-ARTICULAR; INTRALESIONAL; INTRAMUSCULAR; SOFT TISSUE at 09:06

## 2025-06-19 NOTE — PROGRESS NOTES
Subjective:    CC: Follow-up and Injections of the Left Knee and Follow-up and Injections of the Right Knee (F/U CASSIE knee inj 2/17/25. Pt states knees are doing so so. Inj helped a lot last time. Pain hasn't increased. Taking ibuprofen for the pain. States she tried to do PT but can't tolerate it. Can barely lift leg to get into car. Requesting another inj today. No new concerns with them. )       History of Present Illness    HPI:  Patient presents for evaluation of bilateral knee pain. Last received bilateral steroid injection on 2/17/25 that gave some relief. She reports progressively worsening symptoms to the point where she cannot lift her leg. Pain has limited her ability to participate in PT, which she attended once but found too difficult to continue due to lack of endurance and pain.    She has been taking ibuprofen for pain relief, despite being on anticoagulants. She also reports taking Gabapentin, which was prescribed for nerve pain. Her daughter mentions that topical treatments and other medications were previously prescribed for pain management.    She has a history of pulmonary embolism and is currently on anticoagulants. Denies any blood clots in legs. Her blood pressure has been increasing. Her daughter mentions ongoing bladder infections. Her last visit to primary care was on May 26th, and she is scheduled for a follow-up next week.    She denies being diabetic.    PREVIOUS TREATMENTS:  Patient attended physical therapy once but was unable to continue due to pain and lack of endurance.          ROS: Refer to HPI for pertinent ROS. All other 12 point systems negative.    Past Medical History:   Diagnosis Date    Asthma     Digestive disorder     Hyperlipidemia     Hypertension     Kidney hematoma     blood clot on left    Mass of left breast on mammogram     Personal history of colonic polyps 04/14/2022        Past Surgical History:   Procedure Laterality Date    COLONOSCOPY  04/14/2022     TONSILLECTOMY      TRANSFORAMINAL EPIDURAL INJECTION OF STEROID Bilateral 8/21/2024    Procedure: Injection,steroid,epidural,transforaminal approach;  Surgeon: Yeny Adams MD;  Location: [facility] OR;  Service: Pain Management;  Laterality: Bilateral;  Bilateral TFESI L3        Medications Ordered Prior to Encounter[1]     Review of patient's allergies indicates:   Allergen Reactions    Penicillins Hives    Aspirin Rash    Keflex [cephalexin] Rash    Tramadol Rash       Objective:    Vitals:    06/19/25 1030   BP: (!) 150/77   Pulse: 86        Physical Exam: The patient is well-nourished, well-developed, in no apparent distress, pleasant and cooperative. Examination of the bilateral lower extremities, compartments are soft and warm. Skin is intact. There are no signs or symptoms of DVT or infection. There is a mild bilateral joint effusion. There is no erythema. Tenderness to palpation along the anterior joint line as well as posterior knees, right knee range of motion is 5-100; left knee range of motion is 5-105. The knee is stable to stressing with varus and valgus. Negative anterior and posterior drawer. Negative Lachman´s. Negative Alex's test. Patella grind is positive, negative for apprehension. Patient is neurovascularly intact distally.     Images:  previous Images Reviewed and discussed with patient.    Assessment:  1. Bilateral primary osteoarthritis of knee  - Ambulatory Referral/Consult to Physical Therapy; Future  - Large Joint Aspiration/Injection: bilateral knee  - LIDOcaine HCL 20 mg/ml (2%) injection 2 mg  - LIDOcaine HCL 20 mg/ml (2%) injection 2 mg  - methylPREDNISolone acetate injection 80 mg  - methylPREDNISolone acetate injection 80 mg       Plan:  Assessment & Plan    OSTEOARTHRITIS OF KNEE:   Administered corticosteroid injections in both knees to ease discomfort. Injections will not eliminate arthritis or restore lost cartilage, but may help manage pain to facilitate increased  mobility.   Wait 2-3 days for steroid injections to take effect.   Take Tylenol as needed for pain.   Apply topical Voltaren as needed.   Stop taking ibuprofen in setting of blood thinner.   Referred to aquatic PT for mobility and pain.  -Not a surgical candidate a this time given BMI and PE hx.    GAIT AND MOBILITY:   Stay active as tolerated, walking around the house and beyond.   Perform home exercises as provided in printed instructions.    FOLLOW-UP:   Follow up in 4 months.          Follow up: Follow up in about 4 months (around 10/19/2025).    Large Joint Aspiration/Injection: bilateral knee    Date/Time: 6/19/2025 9:45 AM    Performed by: Corazon Zhu PA-C  Authorized by: Corazon Zhu PA-C    Consent Done?:  Yes (Verbal)  Indications:  Pain  Site marked: the procedure site was marked    Prep: patient was prepped and draped in usual sterile fashion    Approach:  Anterolateral  Location:  Knee  Laterality:  Bilateral  Site:  Bilateral knee  Medications (Right):  2 mg LIDOcaine HCL 20 mg/ml (2%) 20 mg/mL (2 %); 80 mg methylPREDNISolone acetate 80 mg/mL  Medications (Left):  2 mg LIDOcaine HCL 20 mg/ml (2%) 20 mg/mL (2 %); 80 mg methylPREDNISolone acetate 80 mg/mL  Patient tolerance:  Patient tolerated the procedure well with no immediate complications       This note was generated with the assistance of ambient listening technology. Verbal consent was obtained by the patient and accompanying visitor(s) for the recording of patient appointment to facilitate this note. I attest to having reviewed and edited the generated note for accuracy, though some syntax or spelling errors may persist. Please contact the author of this note for any clarification.            [1]   Current Outpatient Medications on File Prior to Visit   Medication Sig Dispense Refill    albuterol (PROVENTIL) 2.5 mg /3 mL (0.083 %) nebulizer solution Take 3 mLs (2.5 mg total) by nebulization every 6 (six) hours as needed for  Wheezing or Shortness of Breath. Rescue (Patient not taking: Reported on 5/26/2025) 100 each 6    albuterol (VENTOLIN HFA) 90 mcg/actuation inhaler Inhale 2 puffs into the lungs every 6 (six) hours as needed for Wheezing. Rescue 18 g 3    amLODIPine (NORVASC) 10 MG tablet Take 1 tablet (10 mg total) by mouth once daily. (Patient not taking: Reported on 5/26/2025) 90 tablet 3    DULoxetine (CYMBALTA) 30 MG capsule Take 1 capsule (30 mg total) by mouth once daily. (Patient not taking: Reported on 5/26/2025) 30 capsule 11    ferrous gluconate (FERGON) 324 MG tablet Take 1 tablet (324 mg total) by mouth daily with breakfast. (Patient not taking: Reported on 5/26/2025) 90 tablet 2    furosemide (LASIX) 20 MG tablet Take 1 tablet (20 mg total) by mouth once daily. (Patient not taking: Reported on 5/26/2025) 90 tablet 2    gabapentin (NEURONTIN) 400 MG capsule Take 1 capsule (400 mg total) by mouth every evening. 90 capsule 1    methIMAzole (TAPAZOLE) 5 MG Tab Take 5 mg by mouth 2 (two) times daily. (Patient not taking: Reported on 5/26/2025)      pantoprazole (PROTONIX) 40 MG tablet Take 1 tablet (40 mg total) by mouth once daily. 90 tablet 1    rivaroxaban (XARELTO) 20 mg Tab Take 1 tablet (20 mg total) by mouth daily with dinner or evening meal. (Patient not taking: Reported on 5/26/2025) 90 tablet 3    simvastatin (ZOCOR) 20 MG tablet 1 tablet in the evening Orally Once a day for 30 day(s) (Patient not taking: Reported on 5/26/2025)      traZODone (DESYREL) 50 MG tablet Take 1 tablet (50 mg total) by mouth nightly as needed for Insomnia. 30 tablet 4    walker Misc 1 each by Misc.(Non-Drug; Combo Route) route once daily. Please assist pt with obtaining Rollator to assist with mobility. (Patient not taking: Reported on 5/26/2025) 1 each 0     No current facility-administered medications on file prior to visit.

## 2025-06-19 NOTE — PROCEDURES
Large Joint Aspiration/Injection: bilateral knee    Date/Time: 6/19/2025 9:45 AM    Performed by: Corazon Zhu PA-C  Authorized by: Corazon Zhu PA-C    Consent Done?:  Yes (Verbal)  Indications:  Pain  Site marked: the procedure site was marked    Prep: patient was prepped and draped in usual sterile fashion    Approach:  Anterolateral  Location:  Knee  Laterality:  Bilateral  Site:  Bilateral knee  Medications (Right):  2 mg LIDOcaine HCL 20 mg/ml (2%) 20 mg/mL (2 %); 80 mg methylPREDNISolone acetate 80 mg/mL  Medications (Left):  2 mg LIDOcaine HCL 20 mg/ml (2%) 20 mg/mL (2 %); 80 mg methylPREDNISolone acetate 80 mg/mL  Patient tolerance:  Patient tolerated the procedure well with no immediate complications

## 2025-06-25 ENCOUNTER — OFFICE VISIT (OUTPATIENT)
Dept: CARDIOLOGY | Facility: CLINIC | Age: 70
End: 2025-06-25
Payer: MEDICARE

## 2025-06-25 VITALS
BODY MASS INDEX: 45.54 KG/M2 | RESPIRATION RATE: 20 BRPM | WEIGHT: 283.38 LBS | SYSTOLIC BLOOD PRESSURE: 141 MMHG | DIASTOLIC BLOOD PRESSURE: 60 MMHG | HEART RATE: 76 BPM | TEMPERATURE: 97 F | HEIGHT: 66 IN | OXYGEN SATURATION: 96 %

## 2025-06-25 DIAGNOSIS — E66.01 CLASS 3 SEVERE OBESITY DUE TO EXCESS CALORIES WITH SERIOUS COMORBIDITY AND BODY MASS INDEX (BMI) OF 40.0 TO 44.9 IN ADULT: Chronic | ICD-10-CM

## 2025-06-25 DIAGNOSIS — E66.813 CLASS 3 SEVERE OBESITY DUE TO EXCESS CALORIES WITH SERIOUS COMORBIDITY AND BODY MASS INDEX (BMI) OF 40.0 TO 44.9 IN ADULT: Chronic | ICD-10-CM

## 2025-06-25 DIAGNOSIS — G47.33 OSA (OBSTRUCTIVE SLEEP APNEA): ICD-10-CM

## 2025-06-25 DIAGNOSIS — I10 PRIMARY HYPERTENSION: ICD-10-CM

## 2025-06-25 DIAGNOSIS — I50.30 HEART FAILURE WITH PRESERVED EJECTION FRACTION, UNSPECIFIED HF CHRONICITY: Primary | ICD-10-CM

## 2025-06-25 DIAGNOSIS — Z86.718 PERSONAL HISTORY OF OTHER VENOUS THROMBOSIS AND EMBOLISM: Chronic | ICD-10-CM

## 2025-06-25 PROCEDURE — 99215 OFFICE O/P EST HI 40 MIN: CPT | Mod: PBBFAC | Performed by: INTERNAL MEDICINE

## 2025-06-25 RX ORDER — ACETAMINOPHEN 500 MG
1 TABLET ORAL DAILY
Qty: 1 EACH | Refills: 0 | Status: SHIPPED | OUTPATIENT
Start: 2025-06-25

## 2025-06-25 RX ORDER — SPIRONOLACTONE 25 MG/1
25 TABLET ORAL DAILY
Qty: 30 TABLET | Refills: 2 | Status: SHIPPED | OUTPATIENT
Start: 2025-06-25 | End: 2025-09-23

## 2025-06-25 RX ORDER — SACUBITRIL AND VALSARTAN 49; 51 MG/1; MG/1
1 TABLET, FILM COATED ORAL 2 TIMES DAILY
Qty: 60 TABLET | Refills: 2 | Status: SHIPPED | OUTPATIENT
Start: 2025-06-25 | End: 2025-09-23

## 2025-06-25 RX ORDER — FUROSEMIDE 40 MG/1
40 TABLET ORAL 2 TIMES DAILY
Qty: 60 TABLET | Refills: 11 | Status: SHIPPED | OUTPATIENT
Start: 2025-06-25 | End: 2026-06-25

## 2025-06-25 RX ORDER — SPIRONOLACTONE 25 MG/1
25 TABLET ORAL DAILY
Qty: 30 TABLET | Refills: 11 | Status: SHIPPED | OUTPATIENT
Start: 2025-06-25 | End: 2025-06-25

## 2025-06-25 RX ORDER — FUROSEMIDE 40 MG/1
40 TABLET ORAL 2 TIMES DAILY
Qty: 60 TABLET | Refills: 11 | Status: SHIPPED | OUTPATIENT
Start: 2025-06-25 | End: 2025-06-25

## 2025-06-25 NOTE — PROGRESS NOTES
CHIEF COMPLAINT:   Chief Complaint   Patient presents with    Follow-up     Denies chest pains    Shortness of Breath     On exertion    Back Pain    Headache                                                  HPI:  Leatha Martinez 70 y.o. female Medical history of hypertension, hyperlipidemia, obesity, history of venous thrombosis and embolism presents to Cardiology Clinic today for follow up and ongoing care.  Echocardiogram was ordered at that time which revealed normal systolic function, EF 72%, grade 1 diastolic dysfunction, overall and no further significant valvular structural disease.  See full report below.  At last office visit patient stated that she is feeling fairly well from a cardiac standpoint, her only complaint was stable SOB/SANTIAGO.    Patient reports all symptoms started around Dec 2024 when she lost her grandchild to heart attack at 20. She reports worsening SOB/SANTIAGO. Able to only walk 10ft before experience SANTIAGO. Was able to walk farther couple months ago. Does notes back and knee pain restricting movement. Does experiencing L-sided chest tightness, worsening in intensity for the past month, random in nature, fluctuating in duration, as long as an hour, radiating down both arms. She states that occasionally she feels palpitations where she experiences her heart racing. Patient contributes this to her emotional state.     Patient does experience orthopnea and peripheral edema but has been off Lasix. Patient also experiences lightheadedness with heavy exertion but denies syncope.     She continues to have some sleep apnea symptoms and was actually diagnosed with sleep apnea and put on a CPAP, but she is unable to tolerate.     She reports compliance with her current medications and is tolerating them well. She reports compliance with her Xarelto and is tolerating well without any adverse bleeding effects.  She denies any tobacco or other illicit drug use.                                                                                                                                                                       CARDIAC TESTING:  Echo 12.21.23    Left Ventricle: The left ventricle is normal in size. Mildly increased wall thickness. There is normal systolic function. Biplane (2D) method of discs ejection fraction is 72%. Grade I diastolic dysfunction.    Right Ventricle: Right ventricle was not well visualized due to poor acoustic window. Systolic function is normal.    Aortic Valve: There is mild aortic valve sclerosis. There is mild annular calcification present.    Tricuspid Valve: There is mild to moderate regurgitation.          Patient Active Problem List   Diagnosis    Hypertension    Gastro-esophageal reflux disease without esophagitis    Other hyperlipidemia    Class 3 severe obesity due to excess calories with serious comorbidity and body mass index (BMI) of 40.0 to 44.9 in adult    Personal history of other venous thrombosis and embolism    Personal history of COVID-19    Abnormal TSH    Leukocytosis    Post-COVID chronic dyspnea    Frailty    Iron deficiency anemia    Frequent nocturnal awakening    PND (paroxysmal nocturnal dyspnea)    Snoring    Well adult exam    H/O left breast biopsy    Chronic back pain    OA (osteoarthritis) of knee    Vitamin D deficiency    Obesity    E-coli UTI     Past Surgical History:   Procedure Laterality Date    COLONOSCOPY  04/14/2022    TONSILLECTOMY      TRANSFORAMINAL EPIDURAL INJECTION OF STEROID Bilateral 8/21/2024    Procedure: Injection,steroid,epidural,transforaminal approach;  Surgeon: Yeny Adams MD;  Location: [facility] OR;  Service: Pain Management;  Laterality: Bilateral;  Bilateral TFESI L3     Social History     Socioeconomic History    Marital status: Single    Number of children: 3   Tobacco Use    Smoking status: Never     Passive exposure: Current    Smokeless tobacco: Never   Substance and Sexual Activity    Alcohol use: Never    Drug use: Never     Sexual activity: Not Currently     Partners: Male     Social Drivers of Health     Financial Resource Strain: Medium Risk (9/9/2024)    Overall Financial Resource Strain (CARDIA)     Difficulty of Paying Living Expenses: Somewhat hard   Food Insecurity: Food Insecurity Present (9/9/2024)    Hunger Vital Sign     Worried About Running Out of Food in the Last Year: Sometimes true     Ran Out of Food in the Last Year: Sometimes true   Transportation Needs: High Risk (1/1/2025)    Received from [facility] SDOH Screening     Has lack of transportation kept you from medical appointments, meetings, work or from getting things needed for daily living? choose all that apply.: Yes, it has kept me from medical appointments or from getting my medications     Has lack of transportation kept you from medical appointments, meetings, work or from getting things needed for daily living? choose all that apply.: Yes, it has kept me from non-medical meetings, appointments, work or from getting things that i need   Physical Activity: Inactive (9/10/2024)    Exercise Vital Sign     Days of Exercise per Week: 0 days     Minutes of Exercise per Session: 0 min   Stress: No Stress Concern Present (9/9/2024)    [employer] - Occupational Stress Questionnaire     Feeling of Stress : Only a little   Housing Stability: High Risk (9/9/2024)    Housing Stability Vital Sign     Unable to Pay for Housing in the Last Year: Yes        Family History   Problem Relation Name Age of Onset    Heart attack Mother      Thyroid disease Mother      Diabetes Father      Thyroid disease Father      Heart attack Father       Review of patient's allergies indicates:   Allergen Reactions    Penicillins Hives    Aspirin Rash    Keflex [cephalexin] Rash    Tramadol Rash         ROS:  Review of Systems   Constitutional:  Positive for malaise/fatigue.   HENT: Negative.     Eyes: Negative.    Respiratory:  Positive for  "shortness of breath.    Cardiovascular:  Positive for PND. Negative for chest pain, palpitations, orthopnea, claudication and leg swelling.   Gastrointestinal: Negative.    Genitourinary: Negative.    Musculoskeletal:  Positive for back pain, falls, joint pain and myalgias.   Skin: Negative.    Neurological: Negative.    Endo/Heme/Allergies: Negative.    Psychiatric/Behavioral:  Positive for depression. The patient is nervous/anxious.                                                                                                                                                                                 Negative except as stated in the history of present illness. See HPI for details.    PHYSICAL EXAM:  Visit Vitals  BP (!) 141/60 (BP Location: Left arm, Patient Position: Sitting)   Pulse 76   Temp 97.4 °F (36.3 °C) (Oral)   Resp 20   Ht 5' 6" (1.676 m)   Wt 128.5 kg (283 lb 6.4 oz)   SpO2 96%   BMI 45.74 kg/m²         Physical Exam  Constitutional:       Appearance: Normal appearance. She is obese. She is not ill-appearing.   HENT:      Head: Normocephalic.      Nose: Nose normal.   Eyes:      Extraocular Movements: Extraocular movements intact.   Neck:      Vascular: No carotid bruit, hepatojugular reflux or JVD.   Cardiovascular:      Rate and Rhythm: Normal rate and regular rhythm.      Pulses: Normal pulses.      Heart sounds: Normal heart sounds. No murmur heard.  Pulmonary:      Effort: Pulmonary effort is normal.   Abdominal:      General: There is no distension.      Palpations: Abdomen is soft.   Musculoskeletal:         General: Normal range of motion.      Cervical back: Normal range of motion.      Right lower le+ Pitting Edema present.      Left lower le+ Pitting Edema present.   Skin:     General: Skin is warm.   Neurological:      General: No focal deficit present.      Mental Status: She is alert.   Psychiatric:         Mood and Affect: Mood normal.         Current Outpatient Medications "   Medication Instructions    albuterol (PROVENTIL) 2.5 mg, Nebulization, Every 6 hours PRN, Rescue    albuterol (VENTOLIN HFA) 90 mcg/actuation inhaler 2 puffs, Inhalation, Every 6 hours PRN, Rescue    amLODIPine (NORVASC) 10 mg, Oral, Daily    DULoxetine (CYMBALTA) 30 mg, Oral, Daily    ferrous gluconate (FERGON) 324 mg, Oral, With breakfast    furosemide (LASIX) 20 mg, Oral, Daily    gabapentin (NEURONTIN) 400 mg, Oral, Nightly    methIMAzole (TAPAZOLE) 5 mg, 2 times daily    pantoprazole (PROTONIX) 40 mg, Oral, Daily    rivaroxaban (XARELTO) 20 mg, Oral, With dinner    simvastatin (ZOCOR) 20 MG tablet     traZODone (DESYREL) 50 mg, Oral, Nightly PRN    walker Misc 1 each, Misc.(Non-Drug; Combo Route), Daily, Please assist pt with obtaining Rollator to assist with mobility.        All medications, laboratory studies, cardiac diagnostic imaging reviewed.     Lab Results   Component Value Date    LDL 93.00 05/26/2025    .00 03/12/2024    TRIG 100 05/26/2025    TRIG 84 03/12/2024    CREATININE 0.83 05/26/2025    MG 1.90 07/29/2023    K 4.6 05/26/2025        ASSESSMENT/PLAN:  Leatha Martinez is a 69 y.o. female with a PMH unprovoked subsegmental PE (2013), severe COVID, HTN, HLD, venous insufficiency, obesity here as a followup. PE reportedly occurred after a flight from [place]. She also states she has a family history of blood clots and lupus. She apparently had testing done for hypercoagulability previously.      Chronic SANTIAGO  Heart Failure with preserved Ejection Fraction  -NYHA class III  -Echo revealed normal systolic function, EF 72%, G1DD, mild to mod TR, otherwise unremarkable  -Resume diuresis with Lasix 40mg daily  -Initiate MRA and SGLT2 inhibitor with Aldactone 25mg daily and Jardiance 10mg daily  -Initiate medium-dose Entresto  -Repeat TTE. BMP/BNP at nurse visit  -Consider stress test if ongoing chest discomfort and SOB/SANTIAGO despite GDMT    Hypertension  -BP elevated today at 141/60  -Initiate GDMT as  above  -Advise patient to maintain bp log and bring to nurse visit  -Nurse visit for bp check    Hyperlipidemia  -LDL 93 on 05/2025, at goal   -LDL goal <100  -Simvastatin 40mg daily  -Counseled on the importance of following a low-sodium, low-cholesterol, low-fat diet and exercise as tolerated  -Encouraged weight loss    SEAN  -Patient is endorsing PND, frequent nocturnal awakenings, snoring, and excessive daytime sleepiness- was diagnosed with sleep apnea but unable to tolerate CPAP  -Repeat sleep study ordered    Unprovoked PE  -Continue Xarelto 20mg daily for prior PE.  Denies any adverse bleeding    Initiate Lasix 40mg daily, medium dose Entresto, Aldactone, Jardiance  D/c Amlodipine  TTE  Sleep study  Nurse visit for bp/lab check  RTC in 4mo    Chrissy Quarles DO  [facility] Internal Medicine PGY-1  [facility]

## 2025-06-27 ENCOUNTER — OFFICE VISIT (OUTPATIENT)
Dept: INTERNAL MEDICINE | Facility: CLINIC | Age: 70
End: 2025-06-27
Payer: MEDICARE

## 2025-06-27 VITALS
HEART RATE: 84 BPM | HEIGHT: 66 IN | WEIGHT: 282 LBS | SYSTOLIC BLOOD PRESSURE: 131 MMHG | TEMPERATURE: 98 F | RESPIRATION RATE: 18 BRPM | BODY MASS INDEX: 45.32 KG/M2 | DIASTOLIC BLOOD PRESSURE: 63 MMHG

## 2025-06-27 DIAGNOSIS — I10 PRIMARY HYPERTENSION: Primary | Chronic | ICD-10-CM

## 2025-06-27 PROCEDURE — 99214 OFFICE O/P EST MOD 30 MIN: CPT | Mod: PBBFAC | Performed by: NURSE PRACTITIONER

## 2025-06-27 NOTE — PROGRESS NOTES
Patient ID: [id number]     Chief Complaint: Hypertension        HPI:     HPI      Leatha Martinez is a 70 y.o. female here today for a follow up BP check.         Immunizations:   Immunization History   Administered Date(s) Administered    Influenza (FLUAD) - Quadrivalent - Adjuvanted - PF *Preferred* (65+) 10/04/2022, 09/26/2023    Tdap 05/22/2017        -------------------------------------    Asthma    Digestive disorder    Hyperlipidemia    Hypertension    Kidney hematoma    blood clot on left    Mass of left breast on mammogram    Personal history of colonic polyps        Past Surgical History:   Procedure Laterality Date    COLONOSCOPY  04/14/2022    TONSILLECTOMY      TRANSFORAMINAL EPIDURAL INJECTION OF STEROID Bilateral 8/21/2024    Procedure: Injection,steroid,epidural,transforaminal approach;  Surgeon: Yeny Adasm MD;  Location[facility];  Service: Pain Management;  Laterality: Bilateral;  Bilateral TFESI L3       Review of patient's allergies indicates:   Allergen Reactions    Penicillins Hives    Aspirin Rash    Keflex [cephalexin] Rash    Tramadol Rash       Current Outpatient Medications   Medication Instructions    albuterol (PROVENTIL) 2.5 mg, Nebulization, Every 6 hours PRN, Rescue    albuterol (VENTOLIN HFA) 90 mcg/actuation inhaler 2 puffs, Inhalation, Every 6 hours PRN, Rescue    blood pressure test kit-large Kit 1 each, Misc.(Non-Drug; Combo Route), Daily    DULoxetine (CYMBALTA) 30 mg, Oral, Daily    empagliflozin (JARDIANCE) 10 mg, Oral, Daily    ferrous gluconate (FERGON) 324 mg, Oral, With breakfast    furosemide (LASIX) 40 mg, Oral, 2 times daily    gabapentin (NEURONTIN) 400 mg, Oral, Nightly    methIMAzole (TAPAZOLE) 5 mg, 2 times daily    pantoprazole (PROTONIX) 40 mg, Oral, Daily    rivaroxaban (XARELTO) 20 mg, Oral, With dinner    sacubitriL-valsartan (ENTRESTO) 49-51 mg per tablet 1 tablet, Oral, 2 times daily    simvastatin (ZOCOR) 20 MG tablet     spironolactone (ALDACTONE) 25  Addended by: GILBERTO LUNSFORD on: 8/3/2021 08:48 AM     Modules accepted: Orders     "mg, Oral, Daily    traZODone (DESYREL) 50 mg, Oral, Nightly PRN    walker Misc 1 each, Misc.(Non-Drug; Combo Route), Daily, Please assist pt with obtaining Rollator to assist with mobility.       Social History[1]     Family History   Problem Relation Name Age of Onset    Heart attack Mother      Thyroid disease Mother      Diabetes Father      Thyroid disease Father      Heart attack Father          Patient Care Team:  Shauna Samson FNP as PCP - General (Family Medicine)     Subjective:     Review of Systems     See HPI for details    Constitutional: Denies Change in appetite. Denies Chills. Denies Fever. Denies Night sweats.  Eye: Denies Blurred vision. Denies Discharge. Denies Eye pain.  ENT: Denies Decreased hearing. Denies Sore throat. Denies Swollen glands.  Respiratory: Denies Cough. Denies Shortness of breath. Denies Shortness of breath with exertion. Denies Wheezing.  Cardiovascular: DeniesChest pain at rest. Denies Chest pain with exertion. Denies Irregular heartbeat. Denies Palpitations. Denies Edema.  Gastrointestinal: Denies Abdominal pain. DeniesDiarrhea. Denies Nausea. Denies Vomiting. Denies Hematemesis or Hematochezia.  Genitourinary: Denies Dysuria. Denies Urinary frequency. Denies Urinary urgency. Denies Blood in urine.  Endocrine: Denies Cold intolerance. Denies Excessive thirst. Denies Heat intolerance. Denies Weight loss. Denies Weight gain.  Musculoskeletal: Denies Painful joints. Denies Weakness.  Integumentary: Denies Rash. Denies Itching. Denies Dry skin.  Neurologic: Denies Dizziness. Denies Fainting. Denies Headache.  Psychiatric: Denies Depression. Denies Anxiety. Denies Suicidal/Homicidal ideations.    All Other ROS: Negative except as stated in HPI.       Objective:     Visit Vitals  /63   Pulse 84   Temp 98.2 °F (36.8 °C) (Oral)   Resp 18   Ht 5' 6" (1.676 m)   Wt 127.9 kg (282 lb)   BMI 45.52 kg/m²       Physical Exam    General: Alert and oriented, No acute " distress.  Head: Normocephalic, Atraumatic.  Eye: Pupils are equal, round and reactive to light, Extraocular movements are intact, Sclera non-icteric.  Ears/Nose/Throat: Normal, Mucosa moist,Clear.  Neck/Thyroid: Supple, Non-tender, No carotid bruit, No lymphadenopathy, No JVD, Full range of motion.  Respiratory: Clear to auscultation bilaterally; No wheezes, rales or rhonchi,Non-labored respirations, Symmetrical chest wall expansion.  Cardiovascular: Regular rate and rhythm, S1/S2 normal, No murmurs, rubs or gallops.  Gastrointestinal: Soft, Non-tender, Non-distended, Normal bowel sounds, No palpable organomegaly.  Musculoskeletal: Normal range of motion.  Integumentary: Warm, Dry, Intact, No suspicious lesions or rashes.  Extremities: No clubbing, cyanosis or edema  Neurologic: No focal deficits, Cranial Nerves II-XII are grossly intact, Motor strength normal upper and lower extremities, Sensory exam intact.  Psychiatric: Normal interaction, Coherent speech, Euthymic mood, Appropriate affect       Labs Reviewed:     Chemistry:  Lab Results   Component Value Date     05/26/2025    K 4.6 05/26/2025    BUN 18.7 05/26/2025    CREATININE 0.83 05/26/2025    EGFRNORACEVR >60 05/26/2025    CALCIUM 8.5 05/26/2025    ALKPHOS 126 05/26/2025    ALBUMIN 3.2 (L) 05/26/2025    BILIDIR 0.2 03/15/2022    IBILI 0.10 03/15/2022    AST 16 05/26/2025    ALT 11 05/26/2025    MG 1.90 07/29/2023    PHOS 3.5 08/22/2021    [id number] 9 (L) 01/31/2025        Lab Results   Component Value Date    HGBA1C 6.3 10/04/2022        Hematology:  Lab Results   Component Value Date    WBC 7.41 05/26/2025    HGB 10.1 (L) 05/26/2025    HCT 32.2 (L) 05/26/2025     05/26/2025       Lipid Panel:  Lab Results   Component Value Date    CHOL 151 05/26/2025    HDL 38 05/26/2025    LDL 93.00 05/26/2025    TRIG 100 05/26/2025    TOTALCHOLEST 4 05/26/2025        Urine:  Lab Results   Component Value Date    APPEARANCEUA Clear 01/31/2025    SGUA  1.013 01/31/2025    PROTEINUA Negative 01/31/2025    KETONESUA Negative 01/31/2025    LEUKOCYTESUR 75 (A) 01/31/2025    RBCUA 0-5 01/31/2025    WBCUA 11-20 (A) 01/31/2025    BACTERIA None Seen 01/31/2025    SQEPUA Few (A) 01/31/2025    HYALINECASTS None Seen 01/31/2025    CREATRANDUR 71.4 08/29/2024        Assessment:       ICD-10-CM ICD-9-CM   1. Primary hypertension  I10 401.9        Plan:     1. Primary hypertension (Primary)  BP controlled. Low fat low salt diet and exercise. Cont Entresto, HCTZ as prescribed.          Follow up in about 3 months (around 9/27/2025) for with labs 1 week prior to appt. . In addition to their scheduled follow up, the patient has also been instructed to follow up on as needed basis.     Future Appointments   Date Time Provider Department Center   7/3/2025 12:00 PM [facility] ECHO 01 [facility] [place]    7/10/2025 10:30 AM NURSE, [facility] CARDIOLOGY [facility] CARD [place]    10/7/2025  9:50 AM Lorena Agee, ANP [facility] GYN [place]    10/20/2025  9:45 AM Corazon Zhu PA-C [facility] [place]   11/11/2025 10:30 AM Ramandeep Machuca MD [facility] CARD [place]         RENU Iglesias             [1]   Social History  Socioeconomic History    Marital status: Single    Number of children: 3   Tobacco Use    Smoking status: Never     Passive exposure: Current    Smokeless tobacco: Never   Substance and Sexual Activity    Alcohol use: Never    Drug use: Never    Sexual activity: Not Currently     Partners: Male     Social Drivers of Health     Financial Resource Strain: Medium Risk (9/9/2024)    Overall Financial Resource Strain (CARDI)     Difficulty of Paying Living Expenses: Somewhat hard   Food Insecurity: Food Insecurity Present (9/9/2024)    Hunger Vital Sign     Worried About Running Out of Food in the Last Year: Sometimes true     Ran Out of Food in the Last Year: Sometimes true   Transportation Needs: High Risk (1/1/2025)    [facility]  [facility]     Has lack of transportation kept you from medical appointments, meetings, work or from getting things needed for daily living? choose all that apply.: Yes, it has kept me from medical appointments or from getting my medications     Has lack of transportation kept you from medical appointments, meetings, work or from getting things needed for daily living? choose all that apply.: Yes, it has kept me from non-medical meetings, appointments, work or from getting things that i need   Physical Activity: Inactive (9/10/2024)    Exercise Vital Sign     Days of Exercise per Week: 0 days     Minutes of Exercise per Session: 0 min   Stress: No Stress Concern Present (9/9/2024)    [employer] [place] [employer] - Occupational Stress Questionnaire     Feeling of Stress : Only a little   Housing Stability: High Risk (9/9/2024)    Housing Stability Vital Sign     Unable to Pay for Housing in the Last Year: Yes

## 2025-07-03 ENCOUNTER — HOSPITAL ENCOUNTER (OUTPATIENT)
Dept: CARDIOLOGY | Facility: HOSPITAL | Age: 70
Discharge: HOME | End: 2025-07-03
Payer: MEDICARE

## 2025-07-03 DIAGNOSIS — I50.30 HEART FAILURE WITH PRESERVED EJECTION FRACTION, UNSPECIFIED HF CHRONICITY: ICD-10-CM

## 2025-07-03 LAB
APICAL FOUR CHAMBER EJECTION FRACTION: 57 %
AV INDEX (PROSTH): 0.73
AV MEAN GRADIENT: 11 MMHG
AV PEAK GRADIENT: 21 MMHG
AV VALVE AREA BY VELOCITY RATIO: 2.5 CM²
AV VALVE AREA: 3 CM²
AV VELOCITY RATIO: 0.61
CV ECHO LV RWT: 0.54 CM
DOP CALC AO PEAK VEL: 2.3 M/S
DOP CALC AO VTI: 41.8 CM
DOP CALC LVOT AREA: 4.2 CM2
DOP CALC LVOT DIAMETER: 2.3 CM
DOP CALC LVOT PEAK VEL: 1.4 M/S
DOP CALC LVOT STROKE VOLUME: 126.7 CM3
DOP CALC MV VTI: 31.3 CM
DOP CALCLVOT PEAK VEL VTI: 30.5 CM
E WAVE DECELERATION TIME: 222 MSEC
E/A RATIO: 0.69
E/E' RATIO: 8 M/S
ECHO LV POSTERIOR WALL: 1.3 CM (ref 0.6–1.1)
FRACTIONAL SHORTENING: 35.4 % (ref 28–44)
HR MV ECHO: 78 BPM
INTERVENTRICULAR SEPTUM: 1.1 CM (ref 0.6–1.1)
LEFT ATRIUM AREA SYSTOLIC (APICAL 4 CHAMBER): 20.2 CM2
LEFT ATRIUM SIZE: 4 CM
LEFT INTERNAL DIMENSION IN SYSTOLE: 3.1 CM (ref 2.1–4)
LEFT VENTRICLE DIASTOLIC VOLUME: 108 ML
LEFT VENTRICLE END DIASTOLIC VOLUME APICAL 4 CHAMBER: 74.7 ML
LEFT VENTRICLE END SYSTOLIC VOLUME APICAL 4 CHAMBER: 53.7 ML
LEFT VENTRICLE SYSTOLIC VOLUME: 39 ML
LEFT VENTRICULAR INTERNAL DIMENSION IN DIASTOLE: 4.8 CM (ref 3.5–6)
LEFT VENTRICULAR MASS: 219.1 G
LV LATERAL E/E' RATIO: 5.8 M/S
LV SEPTAL E/E' RATIO: 11.7 M/S
LVED V (TEICH): 108 ML
LVES V (TEICH): 39.1 ML
LVOT MG: 4 MMHG
LVOT MV: 0.98 CM/S
MV MEAN GRADIENT: 3 MMHG
MV PEAK A VEL: 1.01 M/S
MV PEAK E VEL: 0.7 M/S
MV PEAK GRADIENT: 7 MMHG
MV STENOSIS PRESSURE HALF TIME: 54 MS
MV VALVE AREA BY CONTINUITY EQUATION: 4.05 CM2
MV VALVE AREA P 1/2 METHOD: 4.07 CM2
PISA TR MAX VEL: 3.4 M/S
PV PEAK GRADIENT: 8 MMHG
PV PEAK VELOCITY: 1.4 M/S
RA PRESSURE ESTIMATED: 3 MMHG
RV TB RVSP: 6 MMHG
TDI LATERAL: 0.12 M/S
TDI SEPTAL: 0.06 M/S
TDI: 0.09 M/S
TR MAX PG: 46 MMHG
TRICUSPID ANNULAR PLANE SYSTOLIC EXCURSION: 1.7 CM
TV REST PULMONARY ARTERY PRESSURE: 49 MMHG

## 2025-07-03 PROCEDURE — 93306 TTE W/DOPPLER COMPLETE: CPT

## 2025-07-03 PROCEDURE — 93306 TTE W/DOPPLER COMPLETE: CPT | Mod: 26,,, | Performed by: INTERNAL MEDICINE

## 2025-07-10 ENCOUNTER — LAB VISIT (OUTPATIENT)
Dept: LAB | Facility: HOSPITAL | Age: 70
End: 2025-07-10
Payer: MEDICARE

## 2025-07-10 ENCOUNTER — CLINICAL SUPPORT (OUTPATIENT)
Dept: CARDIOLOGY | Facility: CLINIC | Age: 70
End: 2025-07-10
Payer: MEDICARE

## 2025-07-10 VITALS
RESPIRATION RATE: 20 BRPM | HEIGHT: 66 IN | WEIGHT: 291 LBS | SYSTOLIC BLOOD PRESSURE: 148 MMHG | OXYGEN SATURATION: 97 % | DIASTOLIC BLOOD PRESSURE: 70 MMHG | BODY MASS INDEX: 46.77 KG/M2 | HEART RATE: 89 BPM

## 2025-07-10 DIAGNOSIS — I10 HYPERTENSION, UNSPECIFIED TYPE: Primary | Chronic | ICD-10-CM

## 2025-07-10 DIAGNOSIS — I50.30 HEART FAILURE WITH PRESERVED EJECTION FRACTION, UNSPECIFIED HF CHRONICITY: ICD-10-CM

## 2025-07-10 LAB
ANION GAP SERPL CALC-SCNC: 11 MEQ/L
BNP BLD-MCNC: 53.1 PG/ML
BUN SERPL-MCNC: 10.7 MG/DL (ref 9.8–20.1)
CALCIUM SERPL-MCNC: 8.5 MG/DL (ref 8.4–10.2)
CHLORIDE SERPL-SCNC: 109 MMOL/L (ref 98–107)
CO2 SERPL-SCNC: 23 MMOL/L (ref 23–31)
CREAT SERPL-MCNC: 0.74 MG/DL (ref 0.55–1.02)
CREAT/UREA NIT SERPL: 14
GFR SERPLBLD CREATININE-BSD FMLA CKD-EPI: >60 ML/MIN/1.73/M2
GLUCOSE SERPL-MCNC: 108 MG/DL (ref 82–115)
POTASSIUM SERPL-SCNC: 4.2 MMOL/L (ref 3.5–5.1)
SODIUM SERPL-SCNC: 143 MMOL/L (ref 136–145)

## 2025-07-10 PROCEDURE — 99214 OFFICE O/P EST MOD 30 MIN: CPT | Mod: PBBFAC

## 2025-07-10 PROCEDURE — 83880 ASSAY OF NATRIURETIC PEPTIDE: CPT

## 2025-07-10 PROCEDURE — 36415 COLL VENOUS BLD VENIPUNCTURE: CPT

## 2025-07-10 PROCEDURE — 80048 BASIC METABOLIC PNL TOTAL CA: CPT

## 2025-07-10 NOTE — PROGRESS NOTES
Ms. Martinez attends nurse visit. She stated that she did start Lasix as instructed at last visit.  She states that she is breathing easier and has less swelling in legs. She did not bring home   B/P log. She did not bring home medications. She is here alone and has one medication with   her that she thought was Entresto, but in fact, it's trazodone. Today's B/P is 155/86, HR 89.  Manual is 148/70. BMP and BNP drawn and are WDL. I did call her daughter and she is in agreement   that Ms. Martinez is somewhat noncompliant. She is unsure that her mom is taking her medications as   prescribed and may not take them daily. Presented to Dr. Sy.   He requests that she return for another nurse visit with medication bottles, and   preferably her daughter that helps with her medications.  Appointment made with her daughter, Jennifer.

## 2025-08-13 ENCOUNTER — CLINICAL SUPPORT (OUTPATIENT)
Dept: CARDIOLOGY | Facility: CLINIC | Age: 70
End: 2025-08-13
Payer: MEDICARE

## 2025-08-13 VITALS
WEIGHT: 285 LBS | BODY MASS INDEX: 45.8 KG/M2 | TEMPERATURE: 98 F | OXYGEN SATURATION: 99 % | HEART RATE: 74 BPM | RESPIRATION RATE: 20 BRPM | SYSTOLIC BLOOD PRESSURE: 138 MMHG | HEIGHT: 66 IN | DIASTOLIC BLOOD PRESSURE: 58 MMHG

## 2025-08-13 DIAGNOSIS — I10 HYPERTENSION, UNSPECIFIED TYPE: ICD-10-CM

## 2025-08-13 DIAGNOSIS — I50.30 HEART FAILURE WITH PRESERVED EJECTION FRACTION, UNSPECIFIED HF CHRONICITY: Primary | ICD-10-CM

## 2025-08-13 PROCEDURE — 99214 OFFICE O/P EST MOD 30 MIN: CPT | Mod: PBBFAC

## 2025-08-14 DIAGNOSIS — I50.30 HEART FAILURE WITH PRESERVED EJECTION FRACTION, UNSPECIFIED HF CHRONICITY: ICD-10-CM

## 2025-08-15 RX ORDER — SACUBITRIL AND VALSARTAN 49; 51 MG/1; MG/1
1 TABLET ORAL 2 TIMES DAILY
Qty: 60 TABLET | Refills: 2 | Status: SHIPPED | OUTPATIENT
Start: 2025-08-15 | End: 2025-11-13

## 2025-08-15 RX ORDER — SPIRONOLACTONE 25 MG/1
25 TABLET ORAL DAILY
Qty: 30 TABLET | Refills: 2 | Status: SHIPPED | OUTPATIENT
Start: 2025-08-15 | End: 2025-11-13

## 2025-08-31 ENCOUNTER — HOSPITAL ENCOUNTER (EMERGENCY)
Facility: HOSPITAL | Age: 70
Discharge: HOME | End: 2025-08-31
Attending: EMERGENCY MEDICINE
Payer: MEDICARE

## 2025-08-31 VITALS
DIASTOLIC BLOOD PRESSURE: 82 MMHG | WEIGHT: 265 LBS | TEMPERATURE: 98 F | BODY MASS INDEX: 42.59 KG/M2 | OXYGEN SATURATION: 96 % | HEIGHT: 66 IN | RESPIRATION RATE: 20 BRPM | HEART RATE: 84 BPM | SYSTOLIC BLOOD PRESSURE: 114 MMHG

## 2025-08-31 DIAGNOSIS — R07.9 CHEST PAIN: ICD-10-CM

## 2025-08-31 DIAGNOSIS — N39.0 URINARY TRACT INFECTION WITHOUT HEMATURIA, SITE UNSPECIFIED: ICD-10-CM

## 2025-08-31 DIAGNOSIS — R06.02 SOB (SHORTNESS OF BREATH): Primary | ICD-10-CM

## 2025-08-31 LAB
ALBUMIN SERPL-MCNC: 3.2 G/DL (ref 3.4–4.8)
ALBUMIN/GLOB SERPL: 0.7 RATIO (ref 1.1–2)
ALP SERPL-CCNC: 148 UNIT/L (ref 40–150)
ALT SERPL-CCNC: 17 UNIT/L (ref 0–55)
ANION GAP SERPL CALC-SCNC: 10 MEQ/L
AST SERPL-CCNC: 19 UNIT/L (ref 11–45)
BACTERIA #/AREA URNS AUTO: ABNORMAL /HPF
BASOPHILS # BLD AUTO: 0.01 X10(3)/MCL
BASOPHILS NFR BLD AUTO: 0.2 %
BILIRUB SERPL-MCNC: 0.4 MG/DL
BILIRUB UR QL STRIP.AUTO: NEGATIVE
BUN SERPL-MCNC: 14.2 MG/DL (ref 9.8–20.1)
CALCIUM SERPL-MCNC: 8.5 MG/DL (ref 8.4–10.2)
CHLORIDE SERPL-SCNC: 107 MMOL/L (ref 98–107)
CLARITY UR: CLEAR
CO2 SERPL-SCNC: 26 MMOL/L (ref 23–31)
COLOR UR AUTO: ABNORMAL
CREAT SERPL-MCNC: 0.69 MG/DL (ref 0.55–1.02)
CREAT/UREA NIT SERPL: 21
EOSINOPHIL # BLD AUTO: 0.33 X10(3)/MCL (ref 0–0.9)
EOSINOPHIL NFR BLD AUTO: 5.1 %
ERYTHROCYTE [DISTWIDTH] IN BLOOD BY AUTOMATED COUNT: 16 % (ref 11.5–17)
FLUAV AG UPPER RESP QL IA.RAPID: NOT DETECTED
FLUBV AG UPPER RESP QL IA.RAPID: NOT DETECTED
GFR SERPLBLD CREATININE-BSD FMLA CKD-EPI: >60 ML/MIN/1.73/M2
GLOBULIN SER-MCNC: 4.3 GM/DL (ref 2.4–3.5)
GLUCOSE SERPL-MCNC: 99 MG/DL (ref 82–115)
GLUCOSE UR QL STRIP: NORMAL
HCT VFR BLD AUTO: 31 % (ref 37–47)
HGB BLD-MCNC: 10.1 G/DL (ref 12–16)
HGB UR QL STRIP: NEGATIVE
IMM GRANULOCYTES # BLD AUTO: 0.03 X10(3)/MCL (ref 0–0.04)
IMM GRANULOCYTES NFR BLD AUTO: 0.5 %
KETONES UR QL STRIP: NEGATIVE
LEUKOCYTE ESTERASE UR QL STRIP: 75
LYMPHOCYTES # BLD AUTO: 1.58 X10(3)/MCL (ref 0.6–4.6)
LYMPHOCYTES NFR BLD AUTO: 24.6 %
MCH RBC QN AUTO: 24.3 PG (ref 27–31)
MCHC RBC AUTO-ENTMCNC: 32.6 G/DL (ref 33–36)
MCV RBC AUTO: 74.5 FL (ref 80–94)
MONOCYTES # BLD AUTO: 0.62 X10(3)/MCL (ref 0.1–1.3)
MONOCYTES NFR BLD AUTO: 9.6 %
MUCOUS THREADS URNS QL MICRO: ABNORMAL /LPF
NEUTROPHILS # BLD AUTO: 3.86 X10(3)/MCL (ref 2.1–9.2)
NEUTROPHILS NFR BLD AUTO: 60 %
NITRITE UR QL STRIP: NEGATIVE
NRBC BLD AUTO-RTO: 0 %
NT-PROBNP SERPL-MCNC: 160 PG/ML
PH UR STRIP: 6.5 [PH]
PLATELET # BLD AUTO: 230 X10(3)/MCL (ref 130–400)
PMV BLD AUTO: 11.2 FL (ref 7.4–10.4)
POTASSIUM SERPL-SCNC: 4.1 MMOL/L (ref 3.5–5.1)
PROT SERPL-MCNC: 7.5 GM/DL (ref 5.8–7.6)
PROT UR QL STRIP: NEGATIVE
RBC # BLD AUTO: 4.16 X10(6)/MCL (ref 4.2–5.4)
RBC #/AREA URNS AUTO: ABNORMAL /HPF
SARS-COV-2 RNA RESP QL NAA+PROBE: NOT DETECTED
SODIUM SERPL-SCNC: 143 MMOL/L (ref 136–145)
SP GR UR STRIP.AUTO: 1.01 (ref 1–1.03)
SQUAMOUS #/AREA URNS LPF: ABNORMAL /HPF
TROPONIN I SERPL HS-MCNC: 6 NG/L
UROBILINOGEN UR STRIP-ACNC: NORMAL
WBC # BLD AUTO: 6.43 X10(3)/MCL (ref 4.5–11.5)
WBC #/AREA URNS AUTO: ABNORMAL /HPF

## 2025-08-31 PROCEDURE — 93005 ELECTROCARDIOGRAM TRACING: CPT

## 2025-08-31 PROCEDURE — 99900031 HC PATIENT EDUCATION (STAT)

## 2025-08-31 PROCEDURE — 83880 ASSAY OF NATRIURETIC PEPTIDE: CPT | Performed by: EMERGENCY MEDICINE

## 2025-08-31 PROCEDURE — 84484 ASSAY OF TROPONIN QUANT: CPT | Performed by: EMERGENCY MEDICINE

## 2025-08-31 PROCEDURE — 87636 SARSCOV2 & INF A&B AMP PRB: CPT | Performed by: EMERGENCY MEDICINE

## 2025-08-31 PROCEDURE — 25000242 PHARM REV CODE 250 ALT 637 W/ HCPCS: Performed by: EMERGENCY MEDICINE

## 2025-08-31 PROCEDURE — 99900035 HC TECH TIME PER 15 MIN (STAT)

## 2025-08-31 PROCEDURE — 80053 COMPREHEN METABOLIC PANEL: CPT | Performed by: EMERGENCY MEDICINE

## 2025-08-31 PROCEDURE — 85025 COMPLETE CBC W/AUTO DIFF WBC: CPT | Performed by: EMERGENCY MEDICINE

## 2025-08-31 PROCEDURE — 63600175 PHARM REV CODE 636 W HCPCS: Performed by: EMERGENCY MEDICINE

## 2025-08-31 PROCEDURE — 94760 N-INVAS EAR/PLS OXIMETRY 1: CPT

## 2025-08-31 PROCEDURE — 99285 EMERGENCY DEPT VISIT HI MDM: CPT | Mod: 25

## 2025-08-31 PROCEDURE — 94640 AIRWAY INHALATION TREATMENT: CPT

## 2025-08-31 PROCEDURE — 81001 URINALYSIS AUTO W/SCOPE: CPT | Performed by: EMERGENCY MEDICINE

## 2025-08-31 PROCEDURE — 93010 ELECTROCARDIOGRAM REPORT: CPT | Mod: ,,, | Performed by: STUDENT IN AN ORGANIZED HEALTH CARE EDUCATION/TRAINING PROGRAM

## 2025-08-31 RX ORDER — ALBUTEROL SULFATE 90 UG/1
1-2 INHALANT RESPIRATORY (INHALATION) EVERY 6 HOURS PRN
Qty: 18 G | Refills: 1 | Status: SHIPPED | OUTPATIENT
Start: 2025-08-31 | End: 2025-08-31

## 2025-08-31 RX ORDER — ALBUTEROL SULFATE 90 UG/1
1-2 INHALANT RESPIRATORY (INHALATION) EVERY 6 HOURS PRN
Qty: 18 G | Refills: 1 | Status: SHIPPED | OUTPATIENT
Start: 2025-08-31

## 2025-08-31 RX ORDER — NITROFURANTOIN 25; 75 MG/1; MG/1
100 CAPSULE ORAL 2 TIMES DAILY
Qty: 10 CAPSULE | Refills: 0 | Status: SHIPPED | OUTPATIENT
Start: 2025-08-31 | End: 2025-09-05

## 2025-08-31 RX ORDER — METHYLPREDNISOLONE 4 MG/1
TABLET ORAL
Qty: 1 EACH | Refills: 0 | Status: SHIPPED | OUTPATIENT
Start: 2025-08-31

## 2025-08-31 RX ORDER — IPRATROPIUM BROMIDE AND ALBUTEROL SULFATE 2.5; .5 MG/3ML; MG/3ML
3 SOLUTION RESPIRATORY (INHALATION)
Status: COMPLETED | OUTPATIENT
Start: 2025-08-31 | End: 2025-08-31

## 2025-08-31 RX ADMIN — PREDNISONE 60 MG: 50 TABLET ORAL at 10:08

## 2025-08-31 RX ADMIN — IPRATROPIUM BROMIDE AND ALBUTEROL SULFATE 3 ML: .5; 3 SOLUTION RESPIRATORY (INHALATION) at 10:08

## 2025-09-02 LAB
OHS QRS DURATION: 78 MS
OHS QTC CALCULATION: 428 MS

## (undated) DEVICE — SYR 3CC LUER LOC

## (undated) DEVICE — SYR 10CC LUER LOCK

## (undated) DEVICE — NDL SPINAL 22GA 3.5 IN QUINCKE

## (undated) DEVICE — SYR DISP LL 5CC

## (undated) DEVICE — DRAPE MEDIUM SHEET 40X70IN

## (undated) DEVICE — SYR EPILOR LUER-LOK LOR 7ML

## (undated) DEVICE — Device

## (undated) DEVICE — KIT SURGICAL TURNOVER

## (undated) DEVICE — NDL SAFETY 25G X 1.5 ECLIPSE

## (undated) DEVICE — POSITIONER HEAD ADULT

## (undated) DEVICE — DRAPE UTILITY W/ TAPE 20X30IN

## (undated) DEVICE — NDL FLTR 5MCRN BLNT TIP 18GX1

## (undated) DEVICE — BANDAGE SHEER STRIP 3/4X3IN

## (undated) DEVICE — NDL QUINCKE S/SU 22GA 5IN

## (undated) DEVICE — GLOVE PROTEXIS PI CRM 6.5

## (undated) DEVICE — APPLICATOR CHLORAPREP ORN 26ML

## (undated) DEVICE — SET SMARTSITE EXT SMALLBORE NF